# Patient Record
Sex: MALE | Race: WHITE | Employment: OTHER | URBAN - NONMETROPOLITAN AREA
[De-identification: names, ages, dates, MRNs, and addresses within clinical notes are randomized per-mention and may not be internally consistent; named-entity substitution may affect disease eponyms.]

---

## 2017-01-07 ENCOUNTER — HOSPITAL ENCOUNTER (INPATIENT)
Age: 67
LOS: 3 days | Discharge: HOME | DRG: 293 | End: 2017-01-10
Attending: EMERGENCY MEDICINE | Admitting: INTERNAL MEDICINE
Payer: MEDICARE

## 2017-01-07 ENCOUNTER — APPOINTMENT (OUTPATIENT)
Dept: GENERAL RADIOLOGY | Age: 67
DRG: 293 | End: 2017-01-07
Payer: MEDICARE

## 2017-01-07 DIAGNOSIS — I50.9 CONGESTIVE HEART FAILURE, UNSPECIFIED CONGESTIVE HEART FAILURE CHRONICITY, UNSPECIFIED CONGESTIVE HEART FAILURE TYPE: Primary | ICD-10-CM

## 2017-01-07 DIAGNOSIS — R79.89 ELEVATED LIVER FUNCTION TESTS: ICD-10-CM

## 2017-01-07 LAB
ALBUMIN SERPL-MCNC: 3.9 G/DL (ref 3.5–5.2)
ALP BLD-CCNC: 73 U/L (ref 40–130)
ALT SERPL-CCNC: 1425 U/L (ref 5–41)
ANION GAP SERPL CALCULATED.3IONS-SCNC: 19 MMOL/L (ref 7–19)
APTT: 35.5 SEC (ref 26–36.2)
AST SERPL-CCNC: 1472 U/L (ref 5–40)
BASE EXCESS ARTERIAL: -0.8 MMOL/L (ref -2–2)
BASOPHILS ABSOLUTE: 0 K/UL (ref 0–0.2)
BASOPHILS RELATIVE PERCENT: 0.3 % (ref 0–1)
BILIRUB SERPL-MCNC: 1 MG/DL (ref 0.2–1.2)
BUN BLDV-MCNC: 28 MG/DL (ref 8–23)
CALCIUM SERPL-MCNC: 9.2 MG/DL (ref 8.8–10.2)
CARBOXYHEMOGLOBIN ARTERIAL: 4.1 % (ref 0–5)
CHLORIDE BLD-SCNC: 96 MMOL/L (ref 98–111)
CO2: 24 MMOL/L (ref 22–29)
CREAT SERPL-MCNC: 1.1 MG/DL (ref 0.5–1.2)
EOSINOPHILS ABSOLUTE: 0 K/UL (ref 0–0.6)
EOSINOPHILS RELATIVE PERCENT: 0.2 % (ref 0–5)
GFR NON-AFRICAN AMERICAN: >60
GLOBULIN: 2.6 G/DL
GLUCOSE BLD-MCNC: 124 MG/DL (ref 70–99)
GLUCOSE BLD-MCNC: 139 MG/DL (ref 74–109)
HCO3 ARTERIAL: 23.5 MMOL/L (ref 22–26)
HCT VFR BLD CALC: 43.5 % (ref 42–52)
HEMOGLOBIN, ART, EXTENDED: 13.8 G/DL (ref 14–18)
HEMOGLOBIN: 14.1 G/DL (ref 14–18)
INR BLD: 1.41 (ref 0.88–1.18)
LYMPHOCYTES ABSOLUTE: 1.6 K/UL (ref 1.1–4.5)
LYMPHOCYTES RELATIVE PERCENT: 25.6 % (ref 20–40)
MCH RBC QN AUTO: 30.3 PG (ref 27–31)
MCHC RBC AUTO-ENTMCNC: 32.4 G/DL (ref 33–37)
MCV RBC AUTO: 93.5 FL (ref 80–94)
METHEMOGLOBIN ARTERIAL: 1.1 %
MONOCYTES ABSOLUTE: 0.5 K/UL (ref 0–0.9)
MONOCYTES RELATIVE PERCENT: 8 % (ref 0–10)
NEUTROPHILS ABSOLUTE: 4.2 K/UL (ref 1.5–7.5)
NEUTROPHILS RELATIVE PERCENT: 65.9 % (ref 50–65)
O2 CONTENT ARTERIAL: 17.1 ML/DL
O2 SAT, ARTERIAL: 88.2 %
O2 THERAPY: ABNORMAL
PCO2 ARTERIAL: 37 MMHG (ref 35–45)
PDW BLD-RTO: 14.3 % (ref 11.5–14.5)
PERFORMED ON: ABNORMAL
PERFORMED ON: NORMAL
PH ARTERIAL: 7.41 (ref 7.35–7.45)
PLATELET # BLD: 215 K/UL (ref 130–400)
PMV BLD AUTO: 10.8 FL (ref 7.4–10.4)
PO2 ARTERIAL: 63 MMHG (ref 80–100)
POC TROPONIN I: 0.06 NG/ML (ref 0–0.08)
POTASSIUM SERPL-SCNC: 4.8 MMOL/L (ref 3.5–5)
POTASSIUM, WHOLE BLOOD: 3.8
PRO-BNP: ABNORMAL PG/ML (ref 0–900)
PROTHROMBIN TIME: 17.1 SEC (ref 12–14.6)
RBC # BLD: 4.65 M/UL (ref 4.7–6.1)
SODIUM BLD-SCNC: 139 MMOL/L (ref 136–145)
TOTAL PROTEIN: 6.5 G/DL (ref 6.6–8.7)
TROPONIN: <0.01 NG/ML (ref 0–0.03)
WBC # BLD: 6.4 K/UL (ref 4.8–10.8)

## 2017-01-07 PROCEDURE — 84484 ASSAY OF TROPONIN QUANT: CPT

## 2017-01-07 PROCEDURE — 94640 AIRWAY INHALATION TREATMENT: CPT

## 2017-01-07 PROCEDURE — 99284 EMERGENCY DEPT VISIT MOD MDM: CPT | Performed by: EMERGENCY MEDICINE

## 2017-01-07 PROCEDURE — 82803 BLOOD GASES ANY COMBINATION: CPT

## 2017-01-07 PROCEDURE — 6370000000 HC RX 637 (ALT 250 FOR IP): Performed by: INTERNAL MEDICINE

## 2017-01-07 PROCEDURE — 82948 REAGENT STRIP/BLOOD GLUCOSE: CPT

## 2017-01-07 PROCEDURE — 36415 COLL VENOUS BLD VENIPUNCTURE: CPT

## 2017-01-07 PROCEDURE — 85610 PROTHROMBIN TIME: CPT

## 2017-01-07 PROCEDURE — 96375 TX/PRO/DX INJ NEW DRUG ADDON: CPT

## 2017-01-07 PROCEDURE — 2140000000 HC CCU INTERMEDIATE R&B

## 2017-01-07 PROCEDURE — 6370000000 HC RX 637 (ALT 250 FOR IP): Performed by: EMERGENCY MEDICINE

## 2017-01-07 PROCEDURE — 6360000002 HC RX W HCPCS: Performed by: INTERNAL MEDICINE

## 2017-01-07 PROCEDURE — 71020 XR CHEST STANDARD TWO VW: CPT

## 2017-01-07 PROCEDURE — 6360000002 HC RX W HCPCS: Performed by: EMERGENCY MEDICINE

## 2017-01-07 PROCEDURE — 2580000003 HC RX 258: Performed by: INTERNAL MEDICINE

## 2017-01-07 PROCEDURE — 85730 THROMBOPLASTIN TIME PARTIAL: CPT

## 2017-01-07 PROCEDURE — 83880 ASSAY OF NATRIURETIC PEPTIDE: CPT

## 2017-01-07 PROCEDURE — 80074 ACUTE HEPATITIS PANEL: CPT

## 2017-01-07 PROCEDURE — 36600 WITHDRAWAL OF ARTERIAL BLOOD: CPT

## 2017-01-07 PROCEDURE — 80053 COMPREHEN METABOLIC PANEL: CPT

## 2017-01-07 PROCEDURE — 99285 EMERGENCY DEPT VISIT HI MDM: CPT

## 2017-01-07 PROCEDURE — 96374 THER/PROPH/DIAG INJ IV PUSH: CPT

## 2017-01-07 PROCEDURE — 99223 1ST HOSP IP/OBS HIGH 75: CPT | Performed by: INTERNAL MEDICINE

## 2017-01-07 PROCEDURE — 2500000003 HC RX 250 WO HCPCS: Performed by: EMERGENCY MEDICINE

## 2017-01-07 PROCEDURE — 84132 ASSAY OF SERUM POTASSIUM: CPT

## 2017-01-07 PROCEDURE — 93005 ELECTROCARDIOGRAM TRACING: CPT

## 2017-01-07 PROCEDURE — 85025 COMPLETE CBC W/AUTO DIFF WBC: CPT

## 2017-01-07 RX ORDER — NICOTINE 21 MG/24HR
1 PATCH, TRANSDERMAL 24 HOURS TRANSDERMAL DAILY
Status: DISCONTINUED | OUTPATIENT
Start: 2017-01-07 | End: 2017-01-10 | Stop reason: HOSPADM

## 2017-01-07 RX ORDER — NITROGLYCERIN 0.4 MG/1
0.4 TABLET SUBLINGUAL
Status: DISCONTINUED | OUTPATIENT
Start: 2017-01-07 | End: 2017-01-08

## 2017-01-07 RX ORDER — BUMETANIDE 0.25 MG/ML
1 INJECTION, SOLUTION INTRAMUSCULAR; INTRAVENOUS ONCE
Status: COMPLETED | OUTPATIENT
Start: 2017-01-07 | End: 2017-01-07

## 2017-01-07 RX ORDER — GLIPIZIDE 10 MG/1
10 TABLET ORAL
Status: DISCONTINUED | OUTPATIENT
Start: 2017-01-08 | End: 2017-01-09

## 2017-01-07 RX ORDER — ONDANSETRON 2 MG/ML
4 INJECTION INTRAMUSCULAR; INTRAVENOUS EVERY 6 HOURS PRN
Status: DISCONTINUED | OUTPATIENT
Start: 2017-01-07 | End: 2017-01-10 | Stop reason: HOSPADM

## 2017-01-07 RX ORDER — PIOGLITAZONEHYDROCHLORIDE 45 MG/1
45 TABLET ORAL DAILY
Status: DISCONTINUED | OUTPATIENT
Start: 2017-01-07 | End: 2017-01-09

## 2017-01-07 RX ORDER — SODIUM CHLORIDE 0.9 % (FLUSH) 0.9 %
10 SYRINGE (ML) INJECTION EVERY 12 HOURS SCHEDULED
Status: DISCONTINUED | OUTPATIENT
Start: 2017-01-07 | End: 2017-01-10 | Stop reason: HOSPADM

## 2017-01-07 RX ORDER — BUMETANIDE 0.25 MG/ML
1 INJECTION, SOLUTION INTRAMUSCULAR; INTRAVENOUS EVERY 12 HOURS
Status: DISCONTINUED | OUTPATIENT
Start: 2017-01-07 | End: 2017-01-07 | Stop reason: ALTCHOICE

## 2017-01-07 RX ORDER — FUROSEMIDE 10 MG/ML
40 INJECTION INTRAMUSCULAR; INTRAVENOUS 2 TIMES DAILY
Status: DISCONTINUED | OUTPATIENT
Start: 2017-01-08 | End: 2017-01-08

## 2017-01-07 RX ORDER — IPRATROPIUM BROMIDE AND ALBUTEROL SULFATE 2.5; .5 MG/3ML; MG/3ML
1 SOLUTION RESPIRATORY (INHALATION)
Status: DISCONTINUED | OUTPATIENT
Start: 2017-01-07 | End: 2017-01-10 | Stop reason: HOSPADM

## 2017-01-07 RX ORDER — POTASSIUM CHLORIDE 7.45 MG/ML
10 INJECTION INTRAVENOUS PRN
Status: DISCONTINUED | OUTPATIENT
Start: 2017-01-07 | End: 2017-01-10 | Stop reason: HOSPADM

## 2017-01-07 RX ORDER — ASPIRIN 325 MG
325 TABLET ORAL DAILY
Status: DISCONTINUED | OUTPATIENT
Start: 2017-01-07 | End: 2017-01-10 | Stop reason: HOSPADM

## 2017-01-07 RX ORDER — POTASSIUM CHLORIDE 20 MEQ/1
40 TABLET, EXTENDED RELEASE ORAL PRN
Status: DISCONTINUED | OUTPATIENT
Start: 2017-01-07 | End: 2017-01-10 | Stop reason: HOSPADM

## 2017-01-07 RX ORDER — VITAMIN E 268 MG
400 CAPSULE ORAL DAILY
Status: DISCONTINUED | OUTPATIENT
Start: 2017-01-07 | End: 2017-01-10 | Stop reason: HOSPADM

## 2017-01-07 RX ORDER — SODIUM CHLORIDE 0.9 % (FLUSH) 0.9 %
10 SYRINGE (ML) INJECTION PRN
Status: DISCONTINUED | OUTPATIENT
Start: 2017-01-07 | End: 2017-01-10 | Stop reason: HOSPADM

## 2017-01-07 RX ORDER — ACETAMINOPHEN 325 MG/1
650 TABLET ORAL EVERY 4 HOURS PRN
Status: DISCONTINUED | OUTPATIENT
Start: 2017-01-07 | End: 2017-01-10 | Stop reason: HOSPADM

## 2017-01-07 RX ORDER — ACETAMINOPHEN 325 MG/1
650 TABLET ORAL EVERY 4 HOURS PRN
Status: DISCONTINUED | OUTPATIENT
Start: 2017-01-07 | End: 2017-01-10 | Stop reason: SDUPTHER

## 2017-01-07 RX ORDER — CARVEDILOL 6.25 MG/1
6.25 TABLET ORAL 2 TIMES DAILY WITH MEALS
Status: DISCONTINUED | OUTPATIENT
Start: 2017-01-07 | End: 2017-01-08

## 2017-01-07 RX ORDER — OXYBUTYNIN CHLORIDE 5 MG/1
15 TABLET, EXTENDED RELEASE ORAL DAILY
Status: DISCONTINUED | OUTPATIENT
Start: 2017-01-07 | End: 2017-01-10 | Stop reason: HOSPADM

## 2017-01-07 RX ORDER — SODIUM CHLORIDE 0.9 % (FLUSH) 0.9 %
10 SYRINGE (ML) INJECTION EVERY 12 HOURS SCHEDULED
Status: DISCONTINUED | OUTPATIENT
Start: 2017-01-07 | End: 2017-01-10 | Stop reason: SDUPTHER

## 2017-01-07 RX ORDER — POTASSIUM CHLORIDE 20MEQ/15ML
40 LIQUID (ML) ORAL PRN
Status: DISCONTINUED | OUTPATIENT
Start: 2017-01-07 | End: 2017-01-10 | Stop reason: HOSPADM

## 2017-01-07 RX ORDER — SIMVASTATIN 40 MG
40 TABLET ORAL NIGHTLY
Status: DISCONTINUED | OUTPATIENT
Start: 2017-01-07 | End: 2017-01-10 | Stop reason: HOSPADM

## 2017-01-07 RX ORDER — IPRATROPIUM BROMIDE AND ALBUTEROL SULFATE 2.5; .5 MG/3ML; MG/3ML
1 SOLUTION RESPIRATORY (INHALATION) ONCE
Status: COMPLETED | OUTPATIENT
Start: 2017-01-07 | End: 2017-01-07

## 2017-01-07 RX ORDER — METHYLPREDNISOLONE SODIUM SUCCINATE 125 MG/2ML
125 INJECTION, POWDER, LYOPHILIZED, FOR SOLUTION INTRAMUSCULAR; INTRAVENOUS ONCE
Status: COMPLETED | OUTPATIENT
Start: 2017-01-07 | End: 2017-01-07

## 2017-01-07 RX ORDER — SODIUM CHLORIDE 0.9 % (FLUSH) 0.9 %
10 SYRINGE (ML) INJECTION PRN
Status: DISCONTINUED | OUTPATIENT
Start: 2017-01-07 | End: 2017-01-10 | Stop reason: SDUPTHER

## 2017-01-07 RX ORDER — LISINOPRIL 10 MG/1
5 TABLET ORAL DAILY
Status: DISCONTINUED | OUTPATIENT
Start: 2017-01-07 | End: 2017-01-10 | Stop reason: HOSPADM

## 2017-01-07 RX ADMIN — OXYBUTYNIN CHLORIDE 15 MG: 5 TABLET, FILM COATED, EXTENDED RELEASE ORAL at 21:18

## 2017-01-07 RX ADMIN — PIOGLITAZONE 45 MG: 45 TABLET ORAL at 21:18

## 2017-01-07 RX ADMIN — IPRATROPIUM BROMIDE AND ALBUTEROL SULFATE 1 AMPULE: .5; 3 SOLUTION RESPIRATORY (INHALATION) at 16:25

## 2017-01-07 RX ADMIN — IPRATROPIUM BROMIDE AND ALBUTEROL SULFATE 1 AMPULE: .5; 3 SOLUTION RESPIRATORY (INHALATION) at 23:16

## 2017-01-07 RX ADMIN — ENOXAPARIN SODIUM 40 MG: 40 INJECTION SUBCUTANEOUS at 21:19

## 2017-01-07 RX ADMIN — LISINOPRIL 5 MG: 10 TABLET ORAL at 21:18

## 2017-01-07 RX ADMIN — BUMETANIDE 1 MG: 0.25 INJECTION, SOLUTION INTRAMUSCULAR; INTRAVENOUS at 16:45

## 2017-01-07 RX ADMIN — SIMVASTATIN 40 MG: 40 TABLET, FILM COATED ORAL at 21:18

## 2017-01-07 RX ADMIN — VITAMIN E CAP 400 UNIT 400 UNITS: 400 CAP at 21:19

## 2017-01-07 RX ADMIN — CARVEDILOL 6.25 MG: 6.25 TABLET, FILM COATED ORAL at 21:18

## 2017-01-07 RX ADMIN — Medication 10 ML: at 21:19

## 2017-01-07 RX ADMIN — ASPIRIN 325 MG ORAL TABLET 325 MG: 325 PILL ORAL at 21:18

## 2017-01-07 RX ADMIN — METHYLPREDNISOLONE SODIUM SUCCINATE 125 MG: 125 INJECTION, POWDER, FOR SOLUTION INTRAMUSCULAR; INTRAVENOUS at 16:19

## 2017-01-07 ASSESSMENT — ENCOUNTER SYMPTOMS
EYE PAIN: 0
RHINORRHEA: 0
CONSTIPATION: 0
BLOOD IN STOOL: 0
TROUBLE SWALLOWING: 0
COUGH: 1
WHEEZING: 1
SORE THROAT: 0
BACK PAIN: 0
SINUS PRESSURE: 0
SPUTUM PRODUCTION: 1
HEMOPTYSIS: 0
DIARRHEA: 0
FACIAL SWELLING: 0
ABDOMINAL DISTENTION: 0
SHORTNESS OF BREATH: 1
VOMITING: 0
NAUSEA: 0
CHEST TIGHTNESS: 0
STRIDOR: 0
ABDOMINAL PAIN: 0
PHOTOPHOBIA: 0

## 2017-01-07 ASSESSMENT — PAIN SCALES - GENERAL: PAINLEVEL_OUTOF10: 0

## 2017-01-08 LAB
ALBUMIN SERPL-MCNC: 3.7 G/DL (ref 3.5–5.2)
ALP BLD-CCNC: 68 U/L (ref 40–130)
ALT SERPL-CCNC: 1397 U/L (ref 5–41)
ANION GAP SERPL CALCULATED.3IONS-SCNC: 16 MMOL/L (ref 7–19)
AST SERPL-CCNC: 1105 U/L (ref 5–40)
BASOPHILS ABSOLUTE: 0 K/UL (ref 0–0.2)
BASOPHILS RELATIVE PERCENT: 0.2 % (ref 0–1)
BILIRUB SERPL-MCNC: 0.9 MG/DL (ref 0.2–1.2)
BUN BLDV-MCNC: 22 MG/DL (ref 8–23)
CALCIUM SERPL-MCNC: 8.6 MG/DL (ref 8.8–10.2)
CHLORIDE BLD-SCNC: 96 MMOL/L (ref 98–111)
CO2: 26 MMOL/L (ref 22–29)
CREAT SERPL-MCNC: 0.8 MG/DL (ref 0.5–1.2)
EOSINOPHILS ABSOLUTE: 0 K/UL (ref 0–0.6)
EOSINOPHILS RELATIVE PERCENT: 0 % (ref 0–5)
GFR NON-AFRICAN AMERICAN: >60
GLOBULIN: 2.3 G/DL
GLUCOSE BLD-MCNC: 200 MG/DL (ref 74–109)
HCT VFR BLD CALC: 42 % (ref 42–52)
HEMOGLOBIN: 13.8 G/DL (ref 14–18)
LYMPHOCYTES ABSOLUTE: 0.7 K/UL (ref 1.1–4.5)
LYMPHOCYTES RELATIVE PERCENT: 12.9 % (ref 20–40)
MAGNESIUM: 1.7 MG/DL (ref 1.6–2.4)
MCH RBC QN AUTO: 30.1 PG (ref 27–31)
MCHC RBC AUTO-ENTMCNC: 32.9 G/DL (ref 33–37)
MCV RBC AUTO: 91.5 FL (ref 80–94)
MONOCYTES ABSOLUTE: 0.2 K/UL (ref 0–0.9)
MONOCYTES RELATIVE PERCENT: 3 % (ref 0–10)
NEUTROPHILS ABSOLUTE: 4.4 K/UL (ref 1.5–7.5)
NEUTROPHILS RELATIVE PERCENT: 83.9 % (ref 50–65)
PDW BLD-RTO: 14 % (ref 11.5–14.5)
PLATELET # BLD: 195 K/UL (ref 130–400)
PMV BLD AUTO: 10.5 FL (ref 7.4–10.4)
POTASSIUM SERPL-SCNC: 4.3 MMOL/L (ref 3.5–5)
RBC # BLD: 4.59 M/UL (ref 4.7–6.1)
SODIUM BLD-SCNC: 138 MMOL/L (ref 136–145)
TOTAL PROTEIN: 6 G/DL (ref 6.6–8.7)
TROPONIN: <0.01 NG/ML (ref 0–0.03)
WBC # BLD: 5.3 K/UL (ref 4.8–10.8)

## 2017-01-08 PROCEDURE — 94640 AIRWAY INHALATION TREATMENT: CPT

## 2017-01-08 PROCEDURE — 93005 ELECTROCARDIOGRAM TRACING: CPT

## 2017-01-08 PROCEDURE — 84484 ASSAY OF TROPONIN QUANT: CPT

## 2017-01-08 PROCEDURE — 83735 ASSAY OF MAGNESIUM: CPT

## 2017-01-08 PROCEDURE — 6360000002 HC RX W HCPCS: Performed by: INTERNAL MEDICINE

## 2017-01-08 PROCEDURE — 6370000000 HC RX 637 (ALT 250 FOR IP): Performed by: EMERGENCY MEDICINE

## 2017-01-08 PROCEDURE — 36415 COLL VENOUS BLD VENIPUNCTURE: CPT

## 2017-01-08 PROCEDURE — 2580000003 HC RX 258: Performed by: INTERNAL MEDICINE

## 2017-01-08 PROCEDURE — 2500000003 HC RX 250 WO HCPCS: Performed by: INTERNAL MEDICINE

## 2017-01-08 PROCEDURE — 80053 COMPREHEN METABOLIC PANEL: CPT

## 2017-01-08 PROCEDURE — 85025 COMPLETE CBC W/AUTO DIFF WBC: CPT

## 2017-01-08 PROCEDURE — 6370000000 HC RX 637 (ALT 250 FOR IP): Performed by: INTERNAL MEDICINE

## 2017-01-08 PROCEDURE — 2140000000 HC CCU INTERMEDIATE R&B

## 2017-01-08 PROCEDURE — 99232 SBSQ HOSP IP/OBS MODERATE 35: CPT | Performed by: INTERNAL MEDICINE

## 2017-01-08 PROCEDURE — 93306 TTE W/DOPPLER COMPLETE: CPT

## 2017-01-08 RX ORDER — CARVEDILOL 12.5 MG/1
12.5 TABLET ORAL 2 TIMES DAILY WITH MEALS
Status: DISCONTINUED | OUTPATIENT
Start: 2017-01-08 | End: 2017-01-10 | Stop reason: HOSPADM

## 2017-01-08 RX ORDER — SPIRONOLACTONE 25 MG/1
25 TABLET ORAL DAILY
Status: DISCONTINUED | OUTPATIENT
Start: 2017-01-08 | End: 2017-01-10 | Stop reason: HOSPADM

## 2017-01-08 RX ORDER — BUMETANIDE 0.25 MG/ML
2 INJECTION, SOLUTION INTRAMUSCULAR; INTRAVENOUS ONCE
Status: COMPLETED | OUTPATIENT
Start: 2017-01-08 | End: 2017-01-08

## 2017-01-08 RX ORDER — NITROGLYCERIN 0.4 MG/1
0.4 TABLET SUBLINGUAL EVERY 5 MIN PRN
Status: DISCONTINUED | OUTPATIENT
Start: 2017-01-08 | End: 2017-01-10 | Stop reason: HOSPADM

## 2017-01-08 RX ADMIN — Medication 10 ML: at 19:51

## 2017-01-08 RX ADMIN — CARVEDILOL 12.5 MG: 12.5 TABLET, FILM COATED ORAL at 17:45

## 2017-01-08 RX ADMIN — VITAMIN E CAP 400 UNIT 400 UNITS: 400 CAP at 10:02

## 2017-01-08 RX ADMIN — Medication 10 ML: at 10:04

## 2017-01-08 RX ADMIN — IPRATROPIUM BROMIDE AND ALBUTEROL SULFATE 1 AMPULE: .5; 3 SOLUTION RESPIRATORY (INHALATION) at 15:21

## 2017-01-08 RX ADMIN — FUROSEMIDE 40 MG: 10 INJECTION, SOLUTION INTRAMUSCULAR; INTRAVENOUS at 10:01

## 2017-01-08 RX ADMIN — ASPIRIN 325 MG ORAL TABLET 325 MG: 325 PILL ORAL at 10:01

## 2017-01-08 RX ADMIN — OXYBUTYNIN CHLORIDE 15 MG: 5 TABLET, FILM COATED, EXTENDED RELEASE ORAL at 10:01

## 2017-01-08 RX ADMIN — Medication 10 ML: at 10:02

## 2017-01-08 RX ADMIN — IPRATROPIUM BROMIDE AND ALBUTEROL SULFATE 1 AMPULE: .5; 3 SOLUTION RESPIRATORY (INHALATION) at 19:12

## 2017-01-08 RX ADMIN — LISINOPRIL 5 MG: 10 TABLET ORAL at 10:02

## 2017-01-08 RX ADMIN — SPIRONOLACTONE 25 MG: 25 TABLET, FILM COATED ORAL at 14:37

## 2017-01-08 RX ADMIN — IPRATROPIUM BROMIDE AND ALBUTEROL SULFATE 1 AMPULE: .5; 3 SOLUTION RESPIRATORY (INHALATION) at 07:08

## 2017-01-08 RX ADMIN — GLIPIZIDE 10 MG: 10 TABLET ORAL at 10:00

## 2017-01-08 RX ADMIN — BUMETANIDE 2 MG: 0.25 INJECTION, SOLUTION INTRAMUSCULAR; INTRAVENOUS at 14:37

## 2017-01-08 RX ADMIN — SIMVASTATIN 40 MG: 40 TABLET, FILM COATED ORAL at 19:51

## 2017-01-08 RX ADMIN — PIOGLITAZONE 45 MG: 45 TABLET ORAL at 10:01

## 2017-01-08 RX ADMIN — CARVEDILOL 6.25 MG: 6.25 TABLET, FILM COATED ORAL at 10:02

## 2017-01-08 RX ADMIN — IPRATROPIUM BROMIDE AND ALBUTEROL SULFATE 1 AMPULE: .5; 3 SOLUTION RESPIRATORY (INHALATION) at 22:41

## 2017-01-08 RX ADMIN — IPRATROPIUM BROMIDE AND ALBUTEROL SULFATE 1 AMPULE: .5; 3 SOLUTION RESPIRATORY (INHALATION) at 11:03

## 2017-01-08 RX ADMIN — ENOXAPARIN SODIUM 40 MG: 40 INJECTION SUBCUTANEOUS at 19:51

## 2017-01-09 PROBLEM — Z72.0 TOBACCO ABUSE: Chronic | Status: ACTIVE | Noted: 2017-01-09

## 2017-01-09 PROBLEM — I50.23 ACUTE ON CHRONIC SYSTOLIC (CONGESTIVE) HEART FAILURE (HCC): Status: ACTIVE | Noted: 2017-01-09

## 2017-01-09 LAB
ALBUMIN SERPL-MCNC: 3.5 G/DL (ref 3.5–5.2)
ALP BLD-CCNC: 63 U/L (ref 40–130)
ALT SERPL-CCNC: 999 U/L (ref 5–41)
ANION GAP SERPL CALCULATED.3IONS-SCNC: 15 MMOL/L (ref 7–19)
AST SERPL-CCNC: 441 U/L (ref 5–40)
BILIRUB SERPL-MCNC: 0.6 MG/DL (ref 0.2–1.2)
BUN BLDV-MCNC: 29 MG/DL (ref 8–23)
CALCIUM SERPL-MCNC: 8.6 MG/DL (ref 8.8–10.2)
CHLORIDE BLD-SCNC: 98 MMOL/L (ref 98–111)
CO2: 29 MMOL/L (ref 22–29)
CREAT SERPL-MCNC: 1 MG/DL (ref 0.5–1.2)
GFR NON-AFRICAN AMERICAN: >60
GLOBULIN: 2.2 G/DL
GLUCOSE BLD-MCNC: 105 MG/DL (ref 74–109)
GLUCOSE BLD-MCNC: 122 MG/DL (ref 70–99)
GLUCOSE BLD-MCNC: 172 MG/DL (ref 70–99)
GLUCOSE BLD-MCNC: 228 MG/DL (ref 70–99)
HAV IGM SER IA-ACNC: NORMAL
HBA1C MFR BLD: 6.1 %
HEPATITIS B CORE IGM ANTIBODY: NORMAL
HEPATITIS B SURFACE ANTIGEN INTERPRETATION: NORMAL
HEPATITIS C ANTIBODY INTERPRETATION: NORMAL
PERFORMED ON: ABNORMAL
POTASSIUM SERPL-SCNC: 3.7 MMOL/L (ref 3.5–5)
SODIUM BLD-SCNC: 142 MMOL/L (ref 136–145)
TOTAL PROTEIN: 5.7 G/DL (ref 6.6–8.7)

## 2017-01-09 PROCEDURE — 6370000000 HC RX 637 (ALT 250 FOR IP): Performed by: NURSE PRACTITIONER

## 2017-01-09 PROCEDURE — 6360000002 HC RX W HCPCS: Performed by: INTERNAL MEDICINE

## 2017-01-09 PROCEDURE — 2580000003 HC RX 258: Performed by: INTERNAL MEDICINE

## 2017-01-09 PROCEDURE — 80053 COMPREHEN METABOLIC PANEL: CPT

## 2017-01-09 PROCEDURE — 82948 REAGENT STRIP/BLOOD GLUCOSE: CPT

## 2017-01-09 PROCEDURE — 6370000000 HC RX 637 (ALT 250 FOR IP): Performed by: INTERNAL MEDICINE

## 2017-01-09 PROCEDURE — 2140000000 HC CCU INTERMEDIATE R&B

## 2017-01-09 PROCEDURE — 94640 AIRWAY INHALATION TREATMENT: CPT

## 2017-01-09 PROCEDURE — 36415 COLL VENOUS BLD VENIPUNCTURE: CPT

## 2017-01-09 PROCEDURE — 99222 1ST HOSP IP/OBS MODERATE 55: CPT | Performed by: INTERNAL MEDICINE

## 2017-01-09 PROCEDURE — 83036 HEMOGLOBIN GLYCOSYLATED A1C: CPT

## 2017-01-09 PROCEDURE — 93005 ELECTROCARDIOGRAM TRACING: CPT

## 2017-01-09 PROCEDURE — 99231 SBSQ HOSP IP/OBS SF/LOW 25: CPT | Performed by: INTERNAL MEDICINE

## 2017-01-09 PROCEDURE — 6370000000 HC RX 637 (ALT 250 FOR IP): Performed by: EMERGENCY MEDICINE

## 2017-01-09 RX ORDER — DEXTROSE MONOHYDRATE 50 MG/ML
100 INJECTION, SOLUTION INTRAVENOUS PRN
Status: DISCONTINUED | OUTPATIENT
Start: 2017-01-09 | End: 2017-01-10 | Stop reason: HOSPADM

## 2017-01-09 RX ORDER — FUROSEMIDE 10 MG/ML
40 INJECTION INTRAMUSCULAR; INTRAVENOUS DAILY
Status: DISCONTINUED | OUTPATIENT
Start: 2017-01-09 | End: 2017-01-10 | Stop reason: HOSPADM

## 2017-01-09 RX ORDER — DEXTROSE MONOHYDRATE 25 G/50ML
12.5 INJECTION, SOLUTION INTRAVENOUS PRN
Status: DISCONTINUED | OUTPATIENT
Start: 2017-01-09 | End: 2017-01-10 | Stop reason: HOSPADM

## 2017-01-09 RX ORDER — NICOTINE POLACRILEX 4 MG
15 LOZENGE BUCCAL PRN
Status: DISCONTINUED | OUTPATIENT
Start: 2017-01-09 | End: 2017-01-10 | Stop reason: HOSPADM

## 2017-01-09 RX ORDER — INSULIN GLARGINE 100 [IU]/ML
10 INJECTION, SOLUTION SUBCUTANEOUS NIGHTLY
Status: DISCONTINUED | OUTPATIENT
Start: 2017-01-09 | End: 2017-01-10 | Stop reason: HOSPADM

## 2017-01-09 RX ORDER — GLIPIZIDE 10 MG/1
10 TABLET, FILM COATED, EXTENDED RELEASE ORAL DAILY
COMMUNITY
End: 2017-08-16 | Stop reason: SDUPTHER

## 2017-01-09 RX ORDER — M-VIT,TX,IRON,MINS/CALC/FOLIC 27MG-0.4MG
1 TABLET ORAL DAILY
COMMUNITY

## 2017-01-09 RX ORDER — OXYBUTYNIN CHLORIDE 15 MG/1
15 TABLET, EXTENDED RELEASE ORAL DAILY
COMMUNITY
End: 2020-09-02

## 2017-01-09 RX ADMIN — IPRATROPIUM BROMIDE AND ALBUTEROL SULFATE 1 AMPULE: .5; 3 SOLUTION RESPIRATORY (INHALATION) at 10:05

## 2017-01-09 RX ADMIN — IPRATROPIUM BROMIDE AND ALBUTEROL SULFATE 1 AMPULE: .5; 3 SOLUTION RESPIRATORY (INHALATION) at 07:04

## 2017-01-09 RX ADMIN — IPRATROPIUM BROMIDE AND ALBUTEROL SULFATE 1 AMPULE: .5; 3 SOLUTION RESPIRATORY (INHALATION) at 14:51

## 2017-01-09 RX ADMIN — CARVEDILOL 12.5 MG: 12.5 TABLET, FILM COATED ORAL at 08:44

## 2017-01-09 RX ADMIN — Medication 10 ML: at 21:20

## 2017-01-09 RX ADMIN — CARVEDILOL 12.5 MG: 12.5 TABLET, FILM COATED ORAL at 17:34

## 2017-01-09 RX ADMIN — GLIPIZIDE 10 MG: 10 TABLET ORAL at 08:44

## 2017-01-09 RX ADMIN — LISINOPRIL 5 MG: 10 TABLET ORAL at 08:44

## 2017-01-09 RX ADMIN — SIMVASTATIN 40 MG: 40 TABLET, FILM COATED ORAL at 20:25

## 2017-01-09 RX ADMIN — IPRATROPIUM BROMIDE AND ALBUTEROL SULFATE 1 AMPULE: .5; 3 SOLUTION RESPIRATORY (INHALATION) at 22:43

## 2017-01-09 RX ADMIN — IPRATROPIUM BROMIDE AND ALBUTEROL SULFATE 1 AMPULE: .5; 3 SOLUTION RESPIRATORY (INHALATION) at 19:24

## 2017-01-09 RX ADMIN — INSULIN GLARGINE 10 UNITS: 100 INJECTION, SOLUTION SUBCUTANEOUS at 20:25

## 2017-01-09 RX ADMIN — FUROSEMIDE 40 MG: 10 INJECTION, SOLUTION INTRAMUSCULAR; INTRAVENOUS at 17:33

## 2017-01-09 RX ADMIN — Medication 10 ML: at 20:25

## 2017-01-09 RX ADMIN — PIOGLITAZONE 45 MG: 45 TABLET ORAL at 08:44

## 2017-01-09 RX ADMIN — ASPIRIN 325 MG ORAL TABLET 325 MG: 325 PILL ORAL at 08:44

## 2017-01-09 RX ADMIN — INSULIN LISPRO 3 UNITS: 100 INJECTION, SOLUTION INTRAVENOUS; SUBCUTANEOUS at 17:34

## 2017-01-09 RX ADMIN — ACETAMINOPHEN 650 MG: 325 TABLET, FILM COATED ORAL at 09:06

## 2017-01-09 RX ADMIN — OXYBUTYNIN CHLORIDE 15 MG: 5 TABLET, FILM COATED, EXTENDED RELEASE ORAL at 08:44

## 2017-01-09 RX ADMIN — VITAMIN E CAP 400 UNIT 400 UNITS: 400 CAP at 08:43

## 2017-01-09 RX ADMIN — ENOXAPARIN SODIUM 40 MG: 40 INJECTION SUBCUTANEOUS at 20:25

## 2017-01-09 RX ADMIN — Medication 10 ML: at 08:44

## 2017-01-09 RX ADMIN — SPIRONOLACTONE 25 MG: 25 TABLET, FILM COATED ORAL at 08:44

## 2017-01-09 ASSESSMENT — PAIN SCALES - GENERAL: PAINLEVEL_OUTOF10: 5

## 2017-01-10 VITALS
RESPIRATION RATE: 16 BRPM | WEIGHT: 203 LBS | DIASTOLIC BLOOD PRESSURE: 84 MMHG | OXYGEN SATURATION: 96 % | TEMPERATURE: 97 F | HEART RATE: 74 BPM | SYSTOLIC BLOOD PRESSURE: 158 MMHG | HEIGHT: 72 IN | BODY MASS INDEX: 27.5 KG/M2

## 2017-01-10 LAB
EKG P AXIS: 51 DEGREES
EKG P AXIS: 9 DEGREES
EKG P-R INTERVAL: 132 MS
EKG P-R INTERVAL: 146 MS
EKG Q-T INTERVAL: 432 MS
EKG Q-T INTERVAL: 492 MS
EKG QRS DURATION: 152 MS
EKG QRS DURATION: 156 MS
EKG QTC CALCULATION (BAZETT): 467 MS
EKG QTC CALCULATION (BAZETT): 493 MS
EKG T AXIS: -56 DEGREES
EKG T AXIS: -71 DEGREES
GLUCOSE BLD-MCNC: 122 MG/DL (ref 70–99)
PERFORMED ON: ABNORMAL

## 2017-01-10 PROCEDURE — 93350 STRESS TTE ONLY: CPT

## 2017-01-10 PROCEDURE — 93005 ELECTROCARDIOGRAM TRACING: CPT

## 2017-01-10 PROCEDURE — 99232 SBSQ HOSP IP/OBS MODERATE 35: CPT | Performed by: HOSPITALIST

## 2017-01-10 PROCEDURE — 82948 REAGENT STRIP/BLOOD GLUCOSE: CPT

## 2017-01-10 PROCEDURE — 6370000000 HC RX 637 (ALT 250 FOR IP): Performed by: INTERNAL MEDICINE

## 2017-01-10 PROCEDURE — 6370000000 HC RX 637 (ALT 250 FOR IP): Performed by: EMERGENCY MEDICINE

## 2017-01-10 PROCEDURE — 99238 HOSP IP/OBS DSCHRG MGMT 30/<: CPT | Performed by: INTERNAL MEDICINE

## 2017-01-10 PROCEDURE — 6360000002 HC RX W HCPCS: Performed by: INTERNAL MEDICINE

## 2017-01-10 PROCEDURE — 2580000003 HC RX 258: Performed by: INTERNAL MEDICINE

## 2017-01-10 PROCEDURE — 94640 AIRWAY INHALATION TREATMENT: CPT

## 2017-01-10 RX ORDER — SPIRONOLACTONE 25 MG/1
25 TABLET ORAL DAILY
Qty: 30 TABLET | Refills: 3 | Status: SHIPPED | OUTPATIENT
Start: 2017-01-10 | End: 2017-05-03 | Stop reason: SDUPTHER

## 2017-01-10 RX ORDER — SODIUM CHLORIDE 9 MG/ML
INJECTION, SOLUTION INTRAVENOUS
Status: COMPLETED | OUTPATIENT
Start: 2017-01-10 | End: 2017-01-10

## 2017-01-10 RX ORDER — FUROSEMIDE 40 MG/1
40 TABLET ORAL DAILY
Qty: 60 TABLET | Refills: 3 | Status: SHIPPED | OUTPATIENT
Start: 2017-01-10 | End: 2017-08-30 | Stop reason: SDUPTHER

## 2017-01-10 RX ORDER — DOBUTAMINE HYDROCHLORIDE 200 MG/100ML
10 INJECTION INTRAVENOUS ONCE
Status: COMPLETED | OUTPATIENT
Start: 2017-01-10 | End: 2017-01-10

## 2017-01-10 RX ORDER — SODIUM CHLORIDE 0.9 % (FLUSH) 0.9 %
10 SYRINGE (ML) INJECTION PRN
Status: ACTIVE | OUTPATIENT
Start: 2017-01-10 | End: 2017-01-11

## 2017-01-10 RX ADMIN — DOBUTAMINE HYDROCHLORIDE 10 MCG/KG/MIN: 200 INJECTION INTRAVENOUS at 08:00

## 2017-01-10 RX ADMIN — CARVEDILOL 12.5 MG: 12.5 TABLET, FILM COATED ORAL at 09:08

## 2017-01-10 RX ADMIN — LISINOPRIL: 10 TABLET ORAL at 09:10

## 2017-01-10 RX ADMIN — VITAMIN E CAP 400 UNIT 400 UNITS: 400 CAP at 09:08

## 2017-01-10 RX ADMIN — FUROSEMIDE 40 MG: 10 INJECTION, SOLUTION INTRAMUSCULAR; INTRAVENOUS at 09:09

## 2017-01-10 RX ADMIN — OXYBUTYNIN CHLORIDE 15 MG: 5 TABLET, FILM COATED, EXTENDED RELEASE ORAL at 09:08

## 2017-01-10 RX ADMIN — SODIUM CHLORIDE 50 ML: 9 INJECTION, SOLUTION INTRAVENOUS at 08:00

## 2017-01-10 RX ADMIN — IPRATROPIUM BROMIDE AND ALBUTEROL SULFATE 1 AMPULE: .5; 3 SOLUTION RESPIRATORY (INHALATION) at 06:50

## 2017-01-10 RX ADMIN — IPRATROPIUM BROMIDE AND ALBUTEROL SULFATE 1 AMPULE: .5; 3 SOLUTION RESPIRATORY (INHALATION) at 10:50

## 2017-01-10 RX ADMIN — ASPIRIN 325 MG ORAL TABLET 325 MG: 325 PILL ORAL at 09:08

## 2017-01-10 RX ADMIN — Medication 10 ML: at 09:10

## 2017-01-10 RX ADMIN — Medication 10 ML: at 08:30

## 2017-01-10 RX ADMIN — ATROPINE SULFATE 0.5 MG: 0.1 INJECTION PARENTERAL at 08:06

## 2017-01-17 LAB
EKG P AXIS: 29 DEGREES
EKG P-R INTERVAL: 140 MS
EKG Q-T INTERVAL: 464 MS
EKG QRS DURATION: 154 MS
EKG QTC CALCULATION (BAZETT): 479 MS
EKG T AXIS: -46 DEGREES

## 2017-02-08 DIAGNOSIS — I10 ESSENTIAL HYPERTENSION: ICD-10-CM

## 2017-02-08 DIAGNOSIS — I25.119 CORONARY ARTERY DISEASE INVOLVING NATIVE HEART WITH ANGINA PECTORIS, UNSPECIFIED VESSEL OR LESION TYPE (HCC): ICD-10-CM

## 2017-02-08 RX ORDER — RAMIPRIL 5 MG/1
5 CAPSULE ORAL DAILY
Qty: 90 CAPSULE | Refills: 3 | Status: SHIPPED | OUTPATIENT
Start: 2017-02-08 | End: 2018-01-31 | Stop reason: SDUPTHER

## 2017-03-15 ENCOUNTER — OFFICE VISIT (OUTPATIENT)
Dept: CARDIOLOGY | Age: 67
End: 2017-03-15
Payer: MEDICARE

## 2017-03-15 VITALS
HEIGHT: 72 IN | DIASTOLIC BLOOD PRESSURE: 82 MMHG | WEIGHT: 220 LBS | BODY MASS INDEX: 29.8 KG/M2 | SYSTOLIC BLOOD PRESSURE: 138 MMHG

## 2017-03-15 DIAGNOSIS — I10 ESSENTIAL HYPERTENSION: ICD-10-CM

## 2017-03-15 DIAGNOSIS — I25.10 ASHD (ARTERIOSCLEROTIC HEART DISEASE): Primary | ICD-10-CM

## 2017-03-15 PROCEDURE — 99213 OFFICE O/P EST LOW 20 MIN: CPT | Performed by: INTERNAL MEDICINE

## 2017-03-15 PROCEDURE — G8598 ASA/ANTIPLAT THER USED: HCPCS | Performed by: INTERNAL MEDICINE

## 2017-03-15 PROCEDURE — 4040F PNEUMOC VAC/ADMIN/RCVD: CPT | Performed by: INTERNAL MEDICINE

## 2017-03-15 PROCEDURE — 1123F ACP DISCUSS/DSCN MKR DOCD: CPT | Performed by: INTERNAL MEDICINE

## 2017-03-15 PROCEDURE — G8420 CALC BMI NORM PARAMETERS: HCPCS | Performed by: INTERNAL MEDICINE

## 2017-03-15 PROCEDURE — 4004F PT TOBACCO SCREEN RCVD TLK: CPT | Performed by: INTERNAL MEDICINE

## 2017-03-15 PROCEDURE — 3017F COLORECTAL CA SCREEN DOC REV: CPT | Performed by: INTERNAL MEDICINE

## 2017-03-15 PROCEDURE — G8427 DOCREV CUR MEDS BY ELIG CLIN: HCPCS | Performed by: INTERNAL MEDICINE

## 2017-03-15 PROCEDURE — G8484 FLU IMMUNIZE NO ADMIN: HCPCS | Performed by: INTERNAL MEDICINE

## 2017-03-24 DIAGNOSIS — I25.10 CORONARY ARTERY DISEASE INVOLVING NATIVE HEART WITHOUT ANGINA PECTORIS, UNSPECIFIED VESSEL OR LESION TYPE: ICD-10-CM

## 2017-03-24 DIAGNOSIS — I10 ESSENTIAL HYPERTENSION: ICD-10-CM

## 2017-03-24 RX ORDER — METOPROLOL TARTRATE 50 MG/1
50 TABLET, FILM COATED ORAL 2 TIMES DAILY
Qty: 180 TABLET | Refills: 3 | Status: SHIPPED | OUTPATIENT
Start: 2017-03-24 | End: 2018-03-15 | Stop reason: SDUPTHER

## 2017-04-05 DIAGNOSIS — N13.8 BPH (BENIGN PROSTATIC HYPERTROPHY) WITH URINARY OBSTRUCTION: Primary | ICD-10-CM

## 2017-04-05 DIAGNOSIS — N40.1 BPH (BENIGN PROSTATIC HYPERTROPHY) WITH URINARY OBSTRUCTION: Primary | ICD-10-CM

## 2017-04-06 RX ORDER — OXYBUTYNIN CHLORIDE 15 MG/1
TABLET, EXTENDED RELEASE ORAL
Qty: 8 TABLET | Refills: 0 | Status: SHIPPED | OUTPATIENT
Start: 2017-04-06 | End: 2017-04-12 | Stop reason: SDUPTHER

## 2017-04-12 DIAGNOSIS — N40.1 BPH (BENIGN PROSTATIC HYPERTROPHY) WITH URINARY OBSTRUCTION: ICD-10-CM

## 2017-04-12 DIAGNOSIS — N13.8 BPH (BENIGN PROSTATIC HYPERTROPHY) WITH URINARY OBSTRUCTION: ICD-10-CM

## 2017-04-12 RX ORDER — OXYBUTYNIN CHLORIDE 15 MG/1
15 TABLET, EXTENDED RELEASE ORAL DAILY
Qty: 8 TABLET | Refills: 0 | Status: SHIPPED | OUTPATIENT
Start: 2017-04-12 | End: 2017-04-20 | Stop reason: SDUPTHER

## 2017-04-12 NOTE — TELEPHONE ENCOUNTER
Called and spoke with patient and let her know that i called and let her  Know that i called in her RX for her enough till her appointment date. ML

## 2017-04-13 DIAGNOSIS — N40.1 BPH WITH URINARY OBSTRUCTION: Primary | ICD-10-CM

## 2017-04-13 DIAGNOSIS — N13.8 BPH WITH URINARY OBSTRUCTION: Primary | ICD-10-CM

## 2017-04-13 LAB — PSA SERPL-MCNC: 1.11 NG/ML (ref 0–4)

## 2017-04-20 ENCOUNTER — OFFICE VISIT (OUTPATIENT)
Dept: UROLOGY | Facility: CLINIC | Age: 67
End: 2017-04-20

## 2017-04-20 VITALS
TEMPERATURE: 97.5 F | WEIGHT: 225.6 LBS | BODY MASS INDEX: 30.56 KG/M2 | HEIGHT: 72 IN | SYSTOLIC BLOOD PRESSURE: 120 MMHG | DIASTOLIC BLOOD PRESSURE: 74 MMHG

## 2017-04-20 DIAGNOSIS — N40.1 BPH WITH URINARY OBSTRUCTION: Primary | ICD-10-CM

## 2017-04-20 DIAGNOSIS — N40.1 BPH (BENIGN PROSTATIC HYPERTROPHY) WITH URINARY OBSTRUCTION: ICD-10-CM

## 2017-04-20 DIAGNOSIS — N13.8 BPH (BENIGN PROSTATIC HYPERTROPHY) WITH URINARY OBSTRUCTION: ICD-10-CM

## 2017-04-20 DIAGNOSIS — R35.0 FREQUENCY OF URINATION: ICD-10-CM

## 2017-04-20 DIAGNOSIS — N13.8 BPH WITH URINARY OBSTRUCTION: Primary | ICD-10-CM

## 2017-04-20 DIAGNOSIS — R35.1 NOCTURIA: ICD-10-CM

## 2017-04-20 LAB
BILIRUB BLD-MCNC: NEGATIVE MG/DL
CLARITY, POC: CLEAR
COLOR UR: YELLOW
GLUCOSE UR STRIP-MCNC: ABNORMAL MG/DL
KETONES UR QL: NEGATIVE
LEUKOCYTE EST, POC: NEGATIVE
NITRITE UR-MCNC: NEGATIVE MG/ML
PH UR: 5.5 [PH] (ref 5–8)
PROT UR STRIP-MCNC: NEGATIVE MG/DL
RBC # UR STRIP: NEGATIVE /UL
SP GR UR: 1.01 (ref 1–1.03)
UROBILINOGEN UR QL: NORMAL

## 2017-04-20 PROCEDURE — 81003 URINALYSIS AUTO W/O SCOPE: CPT | Performed by: UROLOGY

## 2017-04-20 PROCEDURE — 99213 OFFICE O/P EST LOW 20 MIN: CPT | Performed by: UROLOGY

## 2017-04-20 RX ORDER — SIMVASTATIN 40 MG
TABLET ORAL
COMMUNITY
Start: 2016-04-07

## 2017-04-20 RX ORDER — NITROGLYCERIN 0.4 MG/1
TABLET SUBLINGUAL
COMMUNITY
Start: 2016-08-31

## 2017-04-20 RX ORDER — VITAMIN E 268 MG
CAPSULE ORAL
COMMUNITY

## 2017-04-20 RX ORDER — SPIRONOLACTONE 25 MG/1
TABLET ORAL
COMMUNITY
Start: 2017-01-10

## 2017-04-20 RX ORDER — FUROSEMIDE 40 MG/1
TABLET ORAL
COMMUNITY
Start: 2017-01-10

## 2017-04-20 RX ORDER — ATORVASTATIN CALCIUM 40 MG/1
TABLET, FILM COATED ORAL
COMMUNITY
Start: 2016-09-08 | End: 2017-04-20

## 2017-04-20 RX ORDER — PIOGLITAZONEHYDROCHLORIDE 45 MG/1
TABLET ORAL
COMMUNITY

## 2017-04-20 RX ORDER — OXYBUTYNIN CHLORIDE 15 MG/1
15 TABLET, EXTENDED RELEASE ORAL DAILY
Qty: 8 TABLET | Refills: 0 | Status: SHIPPED | OUTPATIENT
Start: 2017-04-20 | End: 2017-04-24 | Stop reason: SDUPTHER

## 2017-04-20 RX ORDER — METOPROLOL TARTRATE 50 MG/1
TABLET, FILM COATED ORAL
COMMUNITY
Start: 2017-03-24

## 2017-04-20 RX ORDER — ASPIRIN 325 MG
TABLET ORAL
COMMUNITY

## 2017-04-20 RX ORDER — GLIPIZIDE 10 MG/1
TABLET, FILM COATED, EXTENDED RELEASE ORAL
COMMUNITY

## 2017-04-20 RX ORDER — RAMIPRIL 5 MG/1
CAPSULE ORAL
COMMUNITY
Start: 2017-02-08

## 2017-04-20 RX ORDER — M-VIT,TX,IRON,MINS/CALC/FOLIC 27MG-0.4MG
TABLET ORAL
COMMUNITY

## 2017-04-20 RX ORDER — OXYBUTYNIN CHLORIDE 15 MG/1
TABLET, EXTENDED RELEASE ORAL
COMMUNITY
End: 2017-04-24

## 2017-04-20 NOTE — PROGRESS NOTES
Subjective    Mr. Montiel is 66 y.o. male    CHIEF COMPLAINT:BPH    The patient comes in today for follow-up of BPH as a way score today is 10 I he did not RE other he denies any gross hematuria there is no flank pain he still has some mild underlying urgency and frequency but for the most part he can sleep all night.    He denies any urinary tract infection since we seen in his PSA today is 1.1    We are also following him for an overactive irritable bladder he does take Ditropan XL 10 mg needs a refill refill of that this seemed to keep his symptoms stable and again the he is very pleased with this as far as his irritable bladder symptoms    History of Present Illness      The following portions of the patient's history were reviewed and updated as appropriate: allergies, current medications, past family history, past medical history, past social history, past surgical history and problem list.    Review of Systems   Constitutional: Negative.  Negative for chills and fever.   Gastrointestinal: Negative for abdominal distention, abdominal pain, anal bleeding, blood in stool and nausea.   Genitourinary: Positive for frequency and urgency. Negative for difficulty urinating, dysuria, flank pain and hematuria.   Psychiatric/Behavioral: Negative.  Negative for agitation and confusion.         Current Outpatient Prescriptions:   •  aspirin 325 MG tablet, Take  by mouth., Disp: , Rfl:   •  furosemide (LASIX) 40 MG tablet, Take  by mouth., Disp: , Rfl:   •  glipiZIDE (GLUCOTROL) 10 MG 24 hr tablet, Take  by mouth., Disp: , Rfl:   •  metFORMIN (GLUCOPHAGE) 1000 MG tablet, Take  by mouth., Disp: , Rfl:   •  metoprolol tartrate (LOPRESSOR) 50 MG tablet, Take  by mouth., Disp: , Rfl:   •  nitroglycerin (NITROSTAT) 0.4 MG SL tablet, Place  under the tongue Every 5 (Five) Minutes., Disp: , Rfl:   •  oxybutynin XL (DITROPAN XL) 15 MG 24 hr tablet, Take  by mouth., Disp: , Rfl:   •  oxybutynin XL (DITROPAN XL) 15 MG 24 hr tablet,  "Take 1 tablet by mouth Daily., Disp: 8 tablet, Rfl: 0  •  pioglitazone (ACTOS) 45 MG tablet, Take  by mouth., Disp: , Rfl:   •  ramipril (ALTACE) 5 MG capsule, Take  by mouth., Disp: , Rfl:   •  simvastatin (ZOCOR) 40 MG tablet, Take  by mouth., Disp: , Rfl:   •  spironolactone (ALDACTONE) 25 MG tablet, Take  by mouth., Disp: , Rfl:   •  therapeutic multivitamin-minerals (THERAGRAN-M) tablet, Take  by mouth., Disp: , Rfl:   •  vitamin E 400 UNIT capsule, Take  by mouth., Disp: , Rfl:     Past Medical History:   Diagnosis Date   • Cancer     spot on nose   • Diabetes mellitus    • Heart problem    • Hyperlipidemia    • Hypertension        Past Surgical History:   Procedure Laterality Date   • APPENDECTOMY     • CORONARY ARTERY BYPASS GRAFT     • NOSE SURGERY      cancer removed   • PENIS SURGERY      staph infection       Social History     Social History   • Marital status: Unknown     Spouse name: N/A   • Number of children: N/A   • Years of education: N/A     Social History Main Topics   • Smoking status: Former Smoker   • Smokeless tobacco: None   • Alcohol use No   • Drug use: None   • Sexual activity: Not Asked     Other Topics Concern   • None     Social History Narrative   • None       Family History   Problem Relation Age of Onset   • No Known Problems Father    • No Known Problems Mother        Objective    /74  Temp 97.5 °F (36.4 °C)  Ht 72\" (182.9 cm)  Wt 225 lb 9.6 oz (102 kg)  BMI 30.6 kg/m2    Physical Exam      Orders Only on 04/13/2017   Component Date Value Ref Range Status   • PSA 04/13/2017 1.110  0.000 - 4.000 ng/mL Final       Results for orders placed or performed in visit on 04/20/17   POC Urinalysis Dipstick, Automated   Result Value Ref Range    Color Yellow Yellow, Straw, Dark Yellow, Selina    Clarity, UA Clear Clear    Glucose,  mg/dL (A) Negative, 1000 mg/dL (3+) mg/dL    Bilirubin Negative Negative    Ketones, UA Negative Negative    Specific Gravity  1.015 1.005 - 1.030 "    Blood, UA Negative Negative    pH, Urine 5.5 5.0 - 8.0    Protein, POC Negative Negative mg/dL    Urobilinogen, UA Normal Normal    Leukocytes Negative Negative    Nitrite, UA Negative Negative       Assessment and Plan    Eduardo was seen today for benign prostatic hypertrophy.    Diagnoses and all orders for this visit:    BPH with urinary obstruction  -     POC Urinalysis Dipstick, Automated    Frequency of urination    Nocturia    BPH (benign prostatic hypertrophy) with urinary obstruction  -     oxybutynin XL (DITROPAN XL) 15 MG 24 hr tablet; Take 1 tablet by mouth Daily.     plan--from a BPH point review we are going to seen back again he will year we will refill his.  His CHECK a PSA again at that point he has any increase or change in his symptoms she will let us know    From an overactive bladder point review we will refill his medications for him and we will seen back in a year get any change please call    EMR Dragon/Transcription disclaimer:  Much of this encounter note is an electronic transcription/translation of spoken language to printed text. The electronic translation of spoken language may permit erroneous, or at times, nonsensical words or phrases to be inadvertently transcribed; although I have reviewed the note for such errors, some may still exist.

## 2017-04-24 DIAGNOSIS — N13.8 BPH (BENIGN PROSTATIC HYPERTROPHY) WITH URINARY OBSTRUCTION: ICD-10-CM

## 2017-04-24 DIAGNOSIS — N40.1 BPH (BENIGN PROSTATIC HYPERTROPHY) WITH URINARY OBSTRUCTION: ICD-10-CM

## 2017-04-24 RX ORDER — OXYBUTYNIN CHLORIDE 15 MG/1
15 TABLET, EXTENDED RELEASE ORAL DAILY
Qty: 90 TABLET | Refills: 3 | Status: SHIPPED | OUTPATIENT
Start: 2017-04-24 | End: 2018-04-24 | Stop reason: SDUPTHER

## 2017-04-24 NOTE — TELEPHONE ENCOUNTER
Called and spoke with patient and let him know that i called in his RX for him to [facility] pharmacy for a 90 day supply. ML

## 2017-04-25 ENCOUNTER — RESULTS ENCOUNTER (OUTPATIENT)
Dept: UROLOGY | Facility: CLINIC | Age: 67
End: 2017-04-25

## 2017-04-25 DIAGNOSIS — N40.1 BPH WITH URINARY OBSTRUCTION: ICD-10-CM

## 2017-04-25 DIAGNOSIS — N13.8 BPH WITH URINARY OBSTRUCTION: ICD-10-CM

## 2017-05-03 RX ORDER — SPIRONOLACTONE 25 MG/1
TABLET ORAL
Qty: 30 TABLET | Refills: 5 | Status: SHIPPED | OUTPATIENT
Start: 2017-05-03 | End: 2017-11-01 | Stop reason: SDUPTHER

## 2017-07-20 RX ORDER — PIOGLITAZONEHYDROCHLORIDE 45 MG/1
TABLET ORAL
Qty: 30 TABLET | Refills: 2 | Status: SHIPPED | OUTPATIENT
Start: 2017-07-20 | End: 2017-10-18 | Stop reason: SDUPTHER

## 2017-07-21 ENCOUNTER — TELEPHONE (OUTPATIENT)
Dept: INTERNAL MEDICINE | Age: 67
End: 2017-07-21

## 2017-08-02 DIAGNOSIS — E78.5 HYPERLIPIDEMIA: ICD-10-CM

## 2017-08-03 RX ORDER — ATORVASTATIN CALCIUM 40 MG/1
40 TABLET, FILM COATED ORAL DAILY
Qty: 90 TABLET | Refills: 3 | Status: SHIPPED | OUTPATIENT
Start: 2017-08-03 | End: 2017-10-12 | Stop reason: ALTCHOICE

## 2017-08-03 RX ORDER — SIMVASTATIN 40 MG
TABLET ORAL
Qty: 30 TABLET | Refills: 3 | Status: SHIPPED | OUTPATIENT
Start: 2017-08-03 | End: 2017-11-30 | Stop reason: SDUPTHER

## 2017-08-16 RX ORDER — GLIPIZIDE 10 MG/1
TABLET, FILM COATED, EXTENDED RELEASE ORAL
Qty: 90 TABLET | Refills: 1 | Status: SHIPPED | OUTPATIENT
Start: 2017-08-16 | End: 2018-01-31 | Stop reason: SDUPTHER

## 2017-08-29 LAB
ALBUMIN SERPL-MCNC: 4.1 G/DL (ref 3.5–5.2)
ALP BLD-CCNC: 50 U/L (ref 40–130)
ALT SERPL-CCNC: 18 U/L (ref 5–41)
ANION GAP SERPL CALCULATED.3IONS-SCNC: 16 MMOL/L (ref 7–19)
AST SERPL-CCNC: 15 U/L (ref 5–40)
BASOPHILS ABSOLUTE: 0.1 K/UL (ref 0–0.2)
BASOPHILS RELATIVE PERCENT: 1.1 % (ref 0–1)
BILIRUB SERPL-MCNC: 0.3 MG/DL (ref 0.2–1.2)
BUN BLDV-MCNC: 28 MG/DL (ref 8–23)
CALCIUM SERPL-MCNC: 9.2 MG/DL (ref 8.8–10.2)
CHLORIDE BLD-SCNC: 98 MMOL/L (ref 98–111)
CHOLESTEROL, TOTAL: 175 MG/DL (ref 160–199)
CO2: 25 MMOL/L (ref 22–29)
CREAT SERPL-MCNC: 0.9 MG/DL (ref 0.5–1.2)
EOSINOPHILS ABSOLUTE: 0.1 K/UL (ref 0–0.6)
EOSINOPHILS RELATIVE PERCENT: 1.9 % (ref 0–5)
GFR NON-AFRICAN AMERICAN: >60
GLUCOSE BLD-MCNC: 204 MG/DL (ref 74–109)
HBA1C MFR BLD: 8.4 %
HCT VFR BLD CALC: 40 % (ref 42–52)
HDLC SERPL-MCNC: 34 MG/DL (ref 55–121)
HEMOGLOBIN: 13.1 G/DL (ref 14–18)
LDL CHOLESTEROL CALCULATED: 89 MG/DL
LYMPHOCYTES ABSOLUTE: 1.7 K/UL (ref 1.1–4.5)
LYMPHOCYTES RELATIVE PERCENT: 29.7 % (ref 20–40)
MCH RBC QN AUTO: 32 PG (ref 27–31)
MCHC RBC AUTO-ENTMCNC: 32.8 G/DL (ref 33–37)
MCV RBC AUTO: 97.6 FL (ref 80–94)
MONOCYTES ABSOLUTE: 0.5 K/UL (ref 0–0.9)
MONOCYTES RELATIVE PERCENT: 9 % (ref 0–10)
NEUTROPHILS ABSOLUTE: 3.3 K/UL (ref 1.5–7.5)
NEUTROPHILS RELATIVE PERCENT: 57.8 % (ref 50–65)
PDW BLD-RTO: 12.5 % (ref 11.5–14.5)
PLATELET # BLD: 236 K/UL (ref 130–400)
PMV BLD AUTO: 9.7 FL (ref 9.4–12.4)
POTASSIUM SERPL-SCNC: 4.9 MMOL/L (ref 3.5–5)
PROSTATE SPECIFIC ANTIGEN: 0.9 NG/ML (ref 0–4)
RBC # BLD: 4.1 M/UL (ref 4.7–6.1)
SODIUM BLD-SCNC: 139 MMOL/L (ref 136–145)
TOTAL PROTEIN: 6.8 G/DL (ref 6.6–8.7)
TRIGL SERPL-MCNC: 258 MG/DL (ref 150–199)
WBC # BLD: 5.7 K/UL (ref 4.8–10.8)

## 2017-08-30 RX ORDER — FUROSEMIDE 40 MG/1
TABLET ORAL
Qty: 30 TABLET | Refills: 5 | Status: SHIPPED | OUTPATIENT
Start: 2017-08-30 | End: 2018-02-28 | Stop reason: SDUPTHER

## 2017-09-05 ENCOUNTER — OFFICE VISIT (OUTPATIENT)
Dept: INTERNAL MEDICINE | Age: 67
End: 2017-09-05
Payer: MEDICARE

## 2017-09-05 VITALS
SYSTOLIC BLOOD PRESSURE: 130 MMHG | DIASTOLIC BLOOD PRESSURE: 60 MMHG | HEART RATE: 61 BPM | WEIGHT: 225 LBS | OXYGEN SATURATION: 98 % | BODY MASS INDEX: 30.48 KG/M2 | HEIGHT: 72 IN

## 2017-09-05 DIAGNOSIS — I10 ESSENTIAL HYPERTENSION: Chronic | ICD-10-CM

## 2017-09-05 DIAGNOSIS — E78.2 MIXED HYPERLIPIDEMIA: Chronic | ICD-10-CM

## 2017-09-05 DIAGNOSIS — J44.9 CHRONIC OBSTRUCTIVE PULMONARY DISEASE, UNSPECIFIED COPD TYPE (HCC): Chronic | ICD-10-CM

## 2017-09-05 DIAGNOSIS — E11.8 TYPE 2 DIABETES MELLITUS WITH COMPLICATION, WITHOUT LONG-TERM CURRENT USE OF INSULIN (HCC): Primary | Chronic | ICD-10-CM

## 2017-09-05 PROCEDURE — 4040F PNEUMOC VAC/ADMIN/RCVD: CPT | Performed by: INTERNAL MEDICINE

## 2017-09-05 PROCEDURE — 3017F COLORECTAL CA SCREEN DOC REV: CPT | Performed by: INTERNAL MEDICINE

## 2017-09-05 PROCEDURE — 1123F ACP DISCUSS/DSCN MKR DOCD: CPT | Performed by: INTERNAL MEDICINE

## 2017-09-05 PROCEDURE — G8427 DOCREV CUR MEDS BY ELIG CLIN: HCPCS | Performed by: INTERNAL MEDICINE

## 2017-09-05 PROCEDURE — G8926 SPIRO NO PERF OR DOC: HCPCS | Performed by: INTERNAL MEDICINE

## 2017-09-05 PROCEDURE — 1036F TOBACCO NON-USER: CPT | Performed by: INTERNAL MEDICINE

## 2017-09-05 PROCEDURE — 99215 OFFICE O/P EST HI 40 MIN: CPT | Performed by: INTERNAL MEDICINE

## 2017-09-05 PROCEDURE — 3046F HEMOGLOBIN A1C LEVEL >9.0%: CPT | Performed by: INTERNAL MEDICINE

## 2017-09-05 PROCEDURE — 3023F SPIROM DOC REV: CPT | Performed by: INTERNAL MEDICINE

## 2017-09-05 PROCEDURE — G8419 CALC BMI OUT NRM PARAM NOF/U: HCPCS | Performed by: INTERNAL MEDICINE

## 2017-09-05 PROCEDURE — G8598 ASA/ANTIPLAT THER USED: HCPCS | Performed by: INTERNAL MEDICINE

## 2017-09-05 ASSESSMENT — PATIENT HEALTH QUESTIONNAIRE - PHQ9
1. LITTLE INTEREST OR PLEASURE IN DOING THINGS: 0
2. FEELING DOWN, DEPRESSED OR HOPELESS: 0
SUM OF ALL RESPONSES TO PHQ QUESTIONS 1-9: 0
SUM OF ALL RESPONSES TO PHQ9 QUESTIONS 1 & 2: 0

## 2017-09-05 ASSESSMENT — ENCOUNTER SYMPTOMS
BLOOD IN STOOL: 0
ABDOMINAL DISTENTION: 0
EYE DISCHARGE: 0
NAUSEA: 0
VOMITING: 0
EYE ITCHING: 0
ABDOMINAL PAIN: 0
TROUBLE SWALLOWING: 0
SORE THROAT: 0
SHORTNESS OF BREATH: 0
BACK PAIN: 0
WHEEZING: 0

## 2017-10-07 ENCOUNTER — OFFICE VISIT (OUTPATIENT)
Dept: RETAIL CLINIC | Facility: CLINIC | Age: 67
End: 2017-10-07

## 2017-10-07 DIAGNOSIS — Z23 NEED FOR VACCINATION: Primary | ICD-10-CM

## 2017-10-07 NOTE — PROGRESS NOTES
Presents for flu vaccine.  See scanned documents.  Patient reports egg whites makes him sick to his stomach, but the yolk does not.  Advised to remain in facility after injection.  Patient declined. He has had Flu shots in the past without difficulty.

## 2017-10-12 ENCOUNTER — OFFICE VISIT (OUTPATIENT)
Dept: CARDIOLOGY | Age: 67
End: 2017-10-12
Payer: MEDICARE

## 2017-10-12 VITALS
BODY MASS INDEX: 30.48 KG/M2 | DIASTOLIC BLOOD PRESSURE: 56 MMHG | HEIGHT: 72 IN | SYSTOLIC BLOOD PRESSURE: 104 MMHG | WEIGHT: 225 LBS | HEART RATE: 66 BPM

## 2017-10-12 DIAGNOSIS — E78.2 MIXED HYPERLIPIDEMIA: Chronic | ICD-10-CM

## 2017-10-12 DIAGNOSIS — I10 ESSENTIAL HYPERTENSION: Chronic | ICD-10-CM

## 2017-10-12 DIAGNOSIS — I25.10 CORONARY ARTERY DISEASE INVOLVING NATIVE CORONARY ARTERY OF NATIVE HEART WITHOUT ANGINA PECTORIS: Primary | Chronic | ICD-10-CM

## 2017-10-12 DIAGNOSIS — Z95.1 S/P CABG (CORONARY ARTERY BYPASS GRAFT): ICD-10-CM

## 2017-10-12 PROCEDURE — 3017F COLORECTAL CA SCREEN DOC REV: CPT | Performed by: NURSE PRACTITIONER

## 2017-10-12 PROCEDURE — 1036F TOBACCO NON-USER: CPT | Performed by: NURSE PRACTITIONER

## 2017-10-12 PROCEDURE — 4040F PNEUMOC VAC/ADMIN/RCVD: CPT | Performed by: NURSE PRACTITIONER

## 2017-10-12 PROCEDURE — G8598 ASA/ANTIPLAT THER USED: HCPCS | Performed by: NURSE PRACTITIONER

## 2017-10-12 PROCEDURE — 99213 OFFICE O/P EST LOW 20 MIN: CPT | Performed by: NURSE PRACTITIONER

## 2017-10-12 PROCEDURE — 1123F ACP DISCUSS/DSCN MKR DOCD: CPT | Performed by: NURSE PRACTITIONER

## 2017-10-12 PROCEDURE — G8427 DOCREV CUR MEDS BY ELIG CLIN: HCPCS | Performed by: NURSE PRACTITIONER

## 2017-10-12 PROCEDURE — G8484 FLU IMMUNIZE NO ADMIN: HCPCS | Performed by: NURSE PRACTITIONER

## 2017-10-12 PROCEDURE — G8417 CALC BMI ABV UP PARAM F/U: HCPCS | Performed by: NURSE PRACTITIONER

## 2017-10-12 NOTE — PROGRESS NOTES
EF in excesss 60%    CARDIAC CATHETERIZATION  7/22/98    EF 45%    CORONARY ARTERY BYPASS GRAFT  2/27/01    x3, LIMA to LAD,SVG Y graft to 1st & 2nd Obtuse yeimy branches     Family History   Problem Relation Age of Onset    Stroke Father      Social History   Substance Use Topics    Smoking status: Former Smoker     Packs/day: 1.00     Years: 45.00     Types: Cigarettes     Quit date: 1/10/2017    Smokeless tobacco: Former User    Alcohol use Yes      Comment: occ beer      Current Outpatient Prescriptions   Medication Sig Dispense Refill    insulin glargine (LANTUS SOLOSTAR) 100 UNIT/ML injection pen Inject 10 Units into the skin nightly 5 Pen 3    Insulin Pen Needle 32G X 4 MM MISC 1 each by Does not apply route daily 100 each 3    glucose blood VI test strips (ASCENSIA AUTODISC VI;ONE TOUCH ULTRA TEST VI) strip 1 each by In Vitro route 2 times daily As needed.  100 each 3    furosemide (LASIX) 40 MG tablet TAKE ONE TABLET BY MOUTH ONCE DAILY 30 tablet 5    glipiZIDE (GLUCOTROL XL) 10 MG extended release tablet TAKE ONE TABLET BY MOUTH ONCE DAILY 90 tablet 1    simvastatin (ZOCOR) 40 MG tablet TAKE ONE TABLET BY MOUTH ONCE DAILY 30 tablet 3    metFORMIN (GLUCOPHAGE) 1000 MG tablet TAKE ONE TABLET BY MOUTH TWICE DAILY 60 tablet 2    pioglitazone (ACTOS) 45 MG tablet TAKE ONE TABLET BY MOUTH ONCE DAILY 30 tablet 2    spironolactone (ALDACTONE) 25 MG tablet TAKE ONE TABLET BY MOUTH ONCE DAILY 30 tablet 5    metoprolol tartrate (LOPRESSOR) 50 MG tablet Take 1 tablet by mouth 2 times daily 180 tablet 3    ramipril (ALTACE) 5 MG capsule Take 1 capsule by mouth daily 90 capsule 3    Multiple Vitamins-Minerals (THERAPEUTIC MULTIVITAMIN-MINERALS) tablet Take 1 tablet by mouth daily Alive mens 50+      oxybutynin (DITROPAN XL) 15 MG extended release tablet Take 15 mg by mouth daily      nitroGLYCERIN (NITROSTAT) 0.4 MG SL tablet Place 1 tablet under the tongue every 5 minutes (Patient taking groomed. No acute distress. Body habitus - Body mass index is 30.52 kg/m². HEENT  Head is normocephalic. No circumoral cyanosis. Dentition is normal.  EYES -   Lids normal without ptosis. No discharge, edema or subconjunctival hemorrhage. Neck - Symmetrical without apparent mass or lymphadenopathy. Respiratory - Normal respiratory effort without use of accessory muscles. Ausculatation reveals vesicular breath sounds without crackles, wheezes, rub or rhonchi. Cardiovascular  No jugular venous distention. Auscultation reveals regular rate and rhythm. No audible clicks, gallop or rub. No murmur. No lower extremity varicosities. No carotid bruits. Abdominal -  No visible distention, mass or pulsations. Extremities - No clubbing or cyanosis. No statis dermatitis or ulcers. No edema. Musculoskeletal -   No Osler's nodes. No kyphosis or scoliosis. Gait is even and regular without limp or shuffle. Ambulates without assistance. Skin -  Warm and dry; no rash or pallor. No new surgical wound. Neurological - No focal neurological deficits. Thought processes coherent. No apparent tremor. Oriented to person, place and time. Psychiatric -  Appropriate affect and mood. Assessment:    1. Coronary artery disease involving native coronary artery of native heart without angina pectoris     2. Essential hypertension     3. Mixed hyperlipidemia     4. S/P CABG (coronary artery bypass graft)       2D echo report reviewed in office today:  DATE OF SERVICE:  01/08/2017   M-MODE AND 2-DIMENSIONAL ECHOCARDIOGRAM:  1. The aortic root appears normal.  2.  Sclerotic changes of the aortic valve with normal leaflet mobility. 3.  Calcification of the mitral valve annulus with normal leaflet mobility. 4.  The tricuspid leaflets have normal thickness and mobility. 5.  Mild concentric left ventricular hypertrophy with normal left ventricular  chamber size.   There is mild lateral hypokinesis with an estimated left  ventricular ejection fraction of about 45%. The left ventricular ejection  fraction is calculated to be 43%. 6.  Normal right ventricular size and wall motion. 7.  Right atrial enlargement. 8.  Left atrial enlargement. 9.  No pericardial effusion or intracavitary thrombus noted.   COLOR FLOW AND DOPPLER WAVEFORM ANALYSIS:  1.  No definite aortic stenosis or insufficiency noted. 2.  The mitral inflow pattern appeared normal.  There is mild mitral  regurgitation. 3.  There is mild tricuspid regurgitation. The right ventricular systolic  pressure is elevated and estimated to be approximately 60 mmHg.   IMPRESSION:  1. Mild lateral hypokinesis with a left ventricular ejection fraction of 43%  to 45%. 2.  Biatrial enlargement. 3.  Sclerotic changes of the aortic valve. 4.  Mitral annular calcification. 5.  Mild mitral and tricuspid regurgitation. 6.  Elevated right ventricular systolic pressure of 60 mmHg. 1/10/17 Dobutamine stress echo:  IMPRESSION:  Dobutamine stress echo revealing resting global hypokinesis  without clinical, electrocardiographic or echocardiographic evidence of  myocardial ischemia. Stable CV status without symptoms of overt heart failure, arrhythmia or angina. Patient is compliant with medication regimen. BP Readings from Last 3 Encounters:   10/12/17 (!) 104/56   09/05/17 130/60   03/15/17 138/82    Pulse Readings from Last 3 Encounters:   10/12/17 66   09/05/17 61   01/10/17 74        Wt Readings from Last 3 Encounters:   10/12/17 225 lb (102.1 kg)   09/05/17 225 lb (102.1 kg)   03/15/17 220 lb (99.8 kg)     Plan  Previous cardiac history and records reviewed. Continue current medications as prescribed. Continue to follow up with primary care provider for non cardiac medical problems. Call the office with any problems, questions or concerns at [phone]. Follow up as scheduled with your cardiologist - Dr. Maia Gracia as scheduled.   The following

## 2017-10-12 NOTE — PATIENT INSTRUCTIONS
Continue current medications as prescribed. Continue to follow up with primary care provider for non cardiac medical problems. Call the office with any problems, questions or concerns at [phone]. Follow up as scheduled with your cardiologist - Dr. Luanne Cedeño as scheduled. The following educational material has been included in this after visit summary for your review: heart health.     Additional instructions:  Coronary artery disease risk factors you can control: Smoking, high blood pressure, high cholesterol, diabetes, being overweight, lack of exercise and stress. Continue heart healthy diet. Take medications as directed. Exercise as tolerated. Strive for 15 minutes of exercise most days of the week. If asked to keep a blood pressure log, do so for 2 weeks. Call the office to report readings at [phone]. Blood pressure goal is 140/90 or less. If you are a diabetic, the goal is 130/80 or less. If you are taking cholesterol lowering medications, it is recommended that lab work be checked annually. Always keep a current medication list. Bring your medications to every office visit.      How to take:  NITROGLYCERIN (Nitrostat) 0.4 mg tablets, sublingual.  Nitroglycerin is in a group of drugs called nitrates. Nitroglycerin dilates (widens) blood vessels, making it easier for blood to flow through them and easier for the heart to pump. Dosing Guidelines for Nitroglycerin Tablets  · At the start of an angina (chest pain) attack, place one tablet under the tongue or between the cheek and gum. Do not swallow or chew the tablet; let it dissolve on its own. If necessary, a second and third tablet may be used, with five minutes between using each tablet. If you use a third tablet and your chest pain continues, it is time to seek immediate medical attention. Call 911 immediately and have someone drive you to the emergency room.   You may be having a heart attack or other serious heart problem. · To prevent angina from exercise or stress, use 1 tablet 5 to 10 minutes before the activity. Patient Education        A Healthy Heart: Care Instructions  Your Care Instructions    Heart disease occurs when a substance called plaque builds up in the vessels that supply oxygen-rich blood to your heart. This can narrow the blood vessels and reduce blood flow. A heart attack happens when blood flow is completely blocked. A high-fat diet, smoking, and other factors increase the risk of heart disease. Your doctor has found that you have a chance of having heart disease. You can do lots of things to keep your heart healthy. It may not be easy, but you can change your diet, exercise more, and quit smoking. These steps really work to lower your chance of heart disease. Follow-up care is a key part of your treatment and safety. Be sure to make and go to all appointments, and call your doctor if you are having problems. It's also a good idea to know your test results and keep a list of the medicines you take. How can you care for yourself at home? Diet  · Use less salt when you cook and eat. This helps lower your blood pressure. Taste food before salting. Add only a little salt when you think you need it. With time, your taste buds will adjust to less salt. · Eat fewer snack items, fast foods, canned soups, and other high-salt, high-fat, processed foods. · Read food labels and try to avoid saturated and trans fats. They increase your risk of heart disease by raising cholesterol levels. · Limit the amount of solid fat-butter, margarine, and shortening-you eat. Use olive, peanut, or canola oil when you cook. Bake, broil, and steam foods instead of frying them. · Eating fish can lower your risk for heart disease. Eat at least 2 servings of fish a week. [place], mackerel, herring, sardines, and chunk light tuna are very good choices. These fish contain omega-3 fatty acids.   · Eat a variety of fruit and vegetables every day. Dark green, deep orange, red, or yellow fruits and vegetables are especially good for you. Examples include spinach, carrots, peaches, and berries. · Foods high in fiber can reduce your cholesterol and provide important vitamins and minerals. High-fiber foods include whole-grain cereals and breads, oatmeal, beans, brown rice, citrus fruits, and apples. · Limit drinks and foods with added sugar. These include candy, desserts, and soda pop. Lifestyle changes  · If your doctor recommends it, get more exercise. Walking is a good choice. Bit by bit, increase the amount you walk every day. Try for at least 30 minutes on most days of the week. You also may want to swim, bike, or do other activities. · Do not smoke. If you need help quitting, talk to your doctor about stop-smoking programs and medicines. These can increase your chances of quitting for good. Quitting smoking may be the most important step you can take to protect your heart. It is never too late to quit. You will get health benefits right away. · Limit alcohol to 2 drinks a day for men and 1 drink a day for women. Too much alcohol can cause health problems. Medicines  · Take your medicines exactly as prescribed. Call your doctor if you think you are having a problem with your medicine. · If your doctor recommends aspirin, take the amount directed each day. Make sure you take aspirin and not another kind of pain reliever, such as acetaminophen (Tylenol). If you take ibuprofen (such as Advil or Motrin) for other problems, take aspirin at least 2 hours before taking ibuprofen. When should you call for help? Call 911 if you have symptoms of a heart attack. These may include:  · You have chest pain or pressure. This may occur with:  ¨ Chest pain or pressure, or a strange feeling in the chest.  ¨ Sweating. ¨ Shortness of breath.   ¨ Pain, pressure, or a strange feeling in the back, neck, jaw, or upper belly or in one or both shoulders

## 2017-10-19 RX ORDER — PIOGLITAZONEHYDROCHLORIDE 45 MG/1
TABLET ORAL
Qty: 30 TABLET | Refills: 2 | Status: SHIPPED | OUTPATIENT
Start: 2017-10-19 | End: 2018-01-17 | Stop reason: SDUPTHER

## 2017-10-30 RX ORDER — LANCETS 30 GAUGE
1 EACH MISCELLANEOUS 2 TIMES DAILY
Qty: 100 EACH | Refills: 3 | Status: SHIPPED | OUTPATIENT
Start: 2017-10-30 | End: 2017-11-09 | Stop reason: SDUPTHER

## 2017-11-01 RX ORDER — SPIRONOLACTONE 25 MG/1
TABLET ORAL
Qty: 30 TABLET | Refills: 5 | Status: SHIPPED | OUTPATIENT
Start: 2017-11-01 | End: 2018-04-25 | Stop reason: SDUPTHER

## 2017-11-09 RX ORDER — LANCETS 30 GAUGE
1 EACH MISCELLANEOUS 2 TIMES DAILY
Qty: 100 EACH | Refills: 3 | Status: SHIPPED | OUTPATIENT
Start: 2017-11-09 | End: 2017-12-04 | Stop reason: SDUPTHER

## 2017-11-30 DIAGNOSIS — E78.5 HYPERLIPIDEMIA: ICD-10-CM

## 2017-11-30 RX ORDER — SIMVASTATIN 40 MG
TABLET ORAL
Qty: 30 TABLET | Refills: 5 | Status: SHIPPED | OUTPATIENT
Start: 2017-11-30 | End: 2018-05-23 | Stop reason: SDUPTHER

## 2017-11-30 NOTE — TELEPHONE ENCOUNTER
Alan Brown called requesting a refill of the below medication which has been pended for you:     Requested Prescriptions     Pending Prescriptions Disp Refills    simvastatin (ZOCOR) 40 MG tablet [Pharmacy Med Name: SIMVASTATIN 40MG    TAB] 30 tablet 5     Sig: TAKE ONE TABLET BY MOUTH ONCE DAILY       Last Appointment Date: 9/5/2017  Next Appointment Date: 3/5/2018    No Known Allergies

## 2017-12-04 RX ORDER — LANCETS 30 GAUGE
EACH MISCELLANEOUS
Qty: 100 EACH | Refills: 3 | Status: SHIPPED | OUTPATIENT
Start: 2017-12-04

## 2018-01-02 ENCOUNTER — TELEPHONE (OUTPATIENT)
Dept: INTERNAL MEDICINE | Age: 68
End: 2018-01-02

## 2018-01-02 ENCOUNTER — TELEPHONE (OUTPATIENT)
Dept: CARDIOLOGY | Age: 68
End: 2018-01-02

## 2018-01-02 NOTE — TELEPHONE ENCOUNTER
[place],  Should be fine to do a letter. She has a hx of CAD s/p CABG with left ventricular dysfunction.   Thanks, [employer]

## 2018-01-02 NOTE — TELEPHONE ENCOUNTER
Spoke with patient,  He would like a letter excusing him from jury duty sent to his house.   1/2 9:55

## 2018-01-17 RX ORDER — PIOGLITAZONEHYDROCHLORIDE 45 MG/1
TABLET ORAL
Qty: 30 TABLET | Refills: 2 | Status: SHIPPED | OUTPATIENT
Start: 2018-01-17 | End: 2018-04-11 | Stop reason: SDUPTHER

## 2018-01-17 NOTE — TELEPHONE ENCOUNTER
Shelton Sat called requesting a refill of the below medication which has been pended for you:     Requested Prescriptions     Pending Prescriptions Disp Refills    metFORMIN (GLUCOPHAGE) 1000 MG tablet [Pharmacy Med Name: METFORMIN 1000MG TAB] 60 tablet 2     Sig: TAKE ONE TABLET BY MOUTH TWICE DAILY    pioglitazone (ACTOS) 45 MG tablet [Pharmacy Med Name: PIOGLITAZONE 45MG TAB] 30 tablet 2     Sig: TAKE ONE TABLET BY MOUTH ONCE DAILY       Last Appointment Date: 9/5/2017  Next Appointment Date: 3/5/2018    No Known Allergies

## 2018-01-31 DIAGNOSIS — I10 ESSENTIAL HYPERTENSION: ICD-10-CM

## 2018-01-31 DIAGNOSIS — I25.119 CORONARY ARTERY DISEASE INVOLVING NATIVE HEART WITH ANGINA PECTORIS, UNSPECIFIED VESSEL OR LESION TYPE (HCC): ICD-10-CM

## 2018-01-31 DIAGNOSIS — I25.10 CORONARY ARTERY DISEASE INVOLVING NATIVE HEART WITHOUT ANGINA PECTORIS, UNSPECIFIED VESSEL OR LESION TYPE: ICD-10-CM

## 2018-02-01 RX ORDER — GLIPIZIDE 10 MG/1
TABLET, FILM COATED, EXTENDED RELEASE ORAL
Qty: 90 TABLET | Refills: 1 | Status: SHIPPED | OUTPATIENT
Start: 2018-02-01 | End: 2018-08-15 | Stop reason: SDUPTHER

## 2018-02-01 RX ORDER — NITROGLYCERIN 0.4 MG/1
TABLET SUBLINGUAL
Qty: 25 TABLET | Refills: 3 | Status: SHIPPED | OUTPATIENT
Start: 2018-02-01 | End: 2018-05-24 | Stop reason: SDUPTHER

## 2018-02-01 RX ORDER — RAMIPRIL 5 MG/1
CAPSULE ORAL
Qty: 90 CAPSULE | Refills: 3 | Status: SHIPPED | OUTPATIENT
Start: 2018-02-01 | End: 2019-01-30 | Stop reason: SDUPTHER

## 2018-02-26 DIAGNOSIS — E11.8 TYPE 2 DIABETES MELLITUS WITH COMPLICATION, WITHOUT LONG-TERM CURRENT USE OF INSULIN (HCC): Chronic | ICD-10-CM

## 2018-02-26 DIAGNOSIS — E78.2 MIXED HYPERLIPIDEMIA: Chronic | ICD-10-CM

## 2018-02-26 LAB
ALBUMIN SERPL-MCNC: 4 G/DL (ref 3.5–5.2)
ALP BLD-CCNC: 56 U/L (ref 40–130)
ALT SERPL-CCNC: 19 U/L (ref 5–41)
AST SERPL-CCNC: 16 U/L (ref 5–40)
BILIRUB SERPL-MCNC: 0.3 MG/DL (ref 0.2–1.2)
BILIRUBIN DIRECT: 0.1 MG/DL (ref 0–0.3)
BILIRUBIN, INDIRECT: 0.2 MG/DL (ref 0.1–1)
CHOLESTEROL, TOTAL: 164 MG/DL (ref 160–199)
HBA1C MFR BLD: 6.1 %
HDLC SERPL-MCNC: 33 MG/DL (ref 55–121)
LDL CHOLESTEROL CALCULATED: 89 MG/DL
TOTAL PROTEIN: 6.5 G/DL (ref 6.6–8.7)
TRIGL SERPL-MCNC: 212 MG/DL (ref 0–149)

## 2018-02-28 RX ORDER — FUROSEMIDE 40 MG/1
TABLET ORAL
Qty: 30 TABLET | Refills: 5 | Status: SHIPPED | OUTPATIENT
Start: 2018-02-28 | End: 2018-08-23 | Stop reason: SDUPTHER

## 2018-03-05 ENCOUNTER — OFFICE VISIT (OUTPATIENT)
Dept: INTERNAL MEDICINE | Age: 68
End: 2018-03-05
Payer: MEDICARE

## 2018-03-05 VITALS
DIASTOLIC BLOOD PRESSURE: 70 MMHG | HEIGHT: 72 IN | SYSTOLIC BLOOD PRESSURE: 130 MMHG | BODY MASS INDEX: 31.15 KG/M2 | WEIGHT: 230 LBS

## 2018-03-05 DIAGNOSIS — Z23 NEED FOR VACCINATION: ICD-10-CM

## 2018-03-05 DIAGNOSIS — I10 ESSENTIAL HYPERTENSION: Chronic | ICD-10-CM

## 2018-03-05 DIAGNOSIS — E78.2 MIXED HYPERLIPIDEMIA: Chronic | ICD-10-CM

## 2018-03-05 DIAGNOSIS — E11.8 TYPE 2 DIABETES MELLITUS WITH COMPLICATION, WITHOUT LONG-TERM CURRENT USE OF INSULIN (HCC): Primary | Chronic | ICD-10-CM

## 2018-03-05 PROBLEM — N40.1 HYPERTROPHY OF PROSTATE WITH URINARY OBSTRUCTION: Status: ACTIVE | Noted: 2017-04-20

## 2018-03-05 PROBLEM — R35.0 FREQUENCY OF URINATION: Status: ACTIVE | Noted: 2017-04-20

## 2018-03-05 PROBLEM — R35.1 NOCTURIA: Status: ACTIVE | Noted: 2017-04-20

## 2018-03-05 PROBLEM — N13.8 HYPERTROPHY OF PROSTATE WITH URINARY OBSTRUCTION: Status: ACTIVE | Noted: 2017-04-20

## 2018-03-05 PROCEDURE — G8598 ASA/ANTIPLAT THER USED: HCPCS | Performed by: INTERNAL MEDICINE

## 2018-03-05 PROCEDURE — G8484 FLU IMMUNIZE NO ADMIN: HCPCS | Performed by: INTERNAL MEDICINE

## 2018-03-05 PROCEDURE — 4040F PNEUMOC VAC/ADMIN/RCVD: CPT | Performed by: INTERNAL MEDICINE

## 2018-03-05 PROCEDURE — 1036F TOBACCO NON-USER: CPT | Performed by: INTERNAL MEDICINE

## 2018-03-05 PROCEDURE — 99214 OFFICE O/P EST MOD 30 MIN: CPT | Performed by: INTERNAL MEDICINE

## 2018-03-05 PROCEDURE — G0009 ADMIN PNEUMOCOCCAL VACCINE: HCPCS | Performed by: INTERNAL MEDICINE

## 2018-03-05 PROCEDURE — 1123F ACP DISCUSS/DSCN MKR DOCD: CPT | Performed by: INTERNAL MEDICINE

## 2018-03-05 PROCEDURE — 90670 PCV13 VACCINE IM: CPT | Performed by: INTERNAL MEDICINE

## 2018-03-05 PROCEDURE — G8427 DOCREV CUR MEDS BY ELIG CLIN: HCPCS | Performed by: INTERNAL MEDICINE

## 2018-03-05 PROCEDURE — 3044F HG A1C LEVEL LT 7.0%: CPT | Performed by: INTERNAL MEDICINE

## 2018-03-05 PROCEDURE — 3017F COLORECTAL CA SCREEN DOC REV: CPT | Performed by: INTERNAL MEDICINE

## 2018-03-05 PROCEDURE — G8417 CALC BMI ABV UP PARAM F/U: HCPCS | Performed by: INTERNAL MEDICINE

## 2018-03-05 ASSESSMENT — ENCOUNTER SYMPTOMS
TROUBLE SWALLOWING: 0
SINUS PRESSURE: 0
SHORTNESS OF BREATH: 0
ABDOMINAL DISTENTION: 0
BACK PAIN: 0
ABDOMINAL PAIN: 0
BLOOD IN STOOL: 0
VOMITING: 0
WHEEZING: 0
NAUSEA: 0
EYE ITCHING: 0
SORE THROAT: 0
EYE DISCHARGE: 0

## 2018-03-05 NOTE — PROGRESS NOTES
Visit Information    Have you changed or started any medications since your last visit including any over-the-counter medicines, vitamins, or herbal medicines? no   Are you having any side effects from any of your medications? -  no  Have you stopped taking any of your medications? Is so, why? -  no    Have you seen any other physician or provider since your last visit? No  Have you had any other diagnostic tests since your last visit? No  Have you been seen in the emergency room and/or had an admission to a hospital since we last saw you? No  Have you had your routine dental cleaning in the past 6 months? no    Have you activated your [email or web address] account? If not, what are your barriers?  No: ref     Patient Care Team:  Tex Alanis MD as PCP - General (Internal Medicine)  Adan Yanez MD (Cardiology)    Medical History Review  Past Medical, Family, and Social History reviewed and does not contribute to the patient presenting condition    Health Maintenance   Topic Date Due    AAA screen  1950    Diabetic foot exam  06/12/1958    Diabetic retinal exam  06/12/1958    Diabetic microalbuminuria test  06/12/1966    DTaP/Tdap/Td vaccine (1 - Tdap) 06/12/1967    Shingles Vaccine (1 of 2 - 2 Dose Series) 06/12/1998    Colon cancer screen colonoscopy  06/12/1998    Smoker: low dose lung CT screening  06/12/2003    Pneumococcal low/med risk (1 of 2 - PCV13) 06/12/2013    Flu vaccine (1) 09/01/2017    Potassium monitoring  08/29/2018    Creatinine monitoring  08/29/2018    A1C test (Diabetic or Prediabetic)  02/26/2019    Lipid screen  02/26/2019    Hepatitis C screen  Completed

## 2018-03-05 NOTE — PROGRESS NOTES
weakness, numbness and headaches. Hematological: Negative for adenopathy. Psychiatric/Behavioral: Negative for agitation and hallucinations. The patient is not nervous/anxious. Objective:     /70   Ht 6' (1.829 m)   Wt 230 lb (104.3 kg)   BMI 31.19 kg/m²    Physical Exam   Constitutional: He is oriented to person, place, and time. He appears well-developed and well-nourished. No distress. HENT:   Head: Normocephalic and atraumatic. Right Ear: External ear normal.   Left Ear: External ear normal.   Nose: Nose normal.   Mouth/Throat: Oropharynx is clear and moist. No oropharyngeal exudate. Eyes: Conjunctivae and EOM are normal. Pupils are equal, round, and reactive to light. Right eye exhibits no discharge. Left eye exhibits no discharge. No scleral icterus. Neck: Normal range of motion. Neck supple. No JVD present. No tracheal deviation present. No thyromegaly present. Cardiovascular: Normal rate, regular rhythm, normal heart sounds and intact distal pulses. Exam reveals no gallop and no friction rub. No murmur heard. Pulmonary/Chest: Effort normal and breath sounds normal. No respiratory distress. He has no wheezes. He has no rales. Abdominal: Soft. Bowel sounds are normal. He exhibits no distension and no mass. There is no tenderness. There is no rebound and no guarding. Musculoskeletal: Normal range of motion. He exhibits no edema, tenderness or deformity. Lymphadenopathy:     He has no cervical adenopathy. Neurological: He is alert and oriented to person, place, and time. He has normal reflexes. No cranial nerve deficit. He exhibits normal muscle tone. Coordination normal.   Skin: Skin is warm and dry. No rash noted. He is not diaphoretic. No erythema. No pallor. Psychiatric: He has a normal mood and affect. His behavior is normal. Judgment and thought content normal.        Assessment:     1.  Type 2 diabetes mellitus with complication, without long-term current use of insulin (HCC)  Basic Metabolic Panel    Hemoglobin A1C   2. Essential hypertension     3. Mixed hyperlipidemia              Plan:     Type II diabetes mellitus which is under better control. His A1c was over 8 when he was in in August and now is down to 6.1. He's got much better glycemic control and he feels fine were going continue the same doses of medication and recheck again in 3 months. Hypertension which is at goal with his current dose of medications. He is on all takes 5 mg daily and were going continue the same dose. Hypercholesterol which is at goal on his current dose of simvastatin. He's on 40 mg a day. His LDL is less than 100. His total is less than 200. His got no liver enzyme abnormalities and his no side effects with the medication were going continue the same. He stable at this point time and is had a good winter. I'm going to give him a Prevnar and a tetanus shot today. Refill his medicines. See him back in 3 months for recheck on his diabetes. Return in about 3 months (around 6/5/2018) for blood pressure check, diabetes check. Patient given educational materials - see patient instructions. Discussed use, benefit, and side effects of prescribed medications. All patient questions answered. Pt voiced understanding. Reviewed health maintenance. Instructed to continue current medications, diet and exercise. Patient agreed with treatment plan. **This report has been created using voice recognition software. It may contain minor errors which are inherent in voice recognition technology. **    Electronically signed by Rupert Muse MD on 3/5/2018 at 11:29 AM

## 2018-03-15 DIAGNOSIS — I25.10 CORONARY ARTERY DISEASE INVOLVING NATIVE HEART WITHOUT ANGINA PECTORIS, UNSPECIFIED VESSEL OR LESION TYPE: ICD-10-CM

## 2018-03-15 DIAGNOSIS — I10 ESSENTIAL HYPERTENSION: ICD-10-CM

## 2018-03-15 RX ORDER — METOPROLOL TARTRATE 50 MG/1
TABLET, FILM COATED ORAL
Qty: 180 TABLET | Refills: 3 | Status: SHIPPED | OUTPATIENT
Start: 2018-03-15 | End: 2019-03-13 | Stop reason: SDUPTHER

## 2018-04-19 DIAGNOSIS — N40.1 BPH WITH URINARY OBSTRUCTION: Primary | ICD-10-CM

## 2018-04-19 DIAGNOSIS — N13.8 BPH WITH URINARY OBSTRUCTION: Primary | ICD-10-CM

## 2018-04-19 DIAGNOSIS — R97.20 ELEVATED PROSTATE SPECIFIC ANTIGEN (PSA): ICD-10-CM

## 2018-04-19 LAB — PSA SERPL-MCNC: 1.52 NG/ML (ref 0–4)

## 2018-04-24 ENCOUNTER — OFFICE VISIT (OUTPATIENT)
Dept: UROLOGY | Facility: CLINIC | Age: 68
End: 2018-04-24

## 2018-04-24 VITALS
SYSTOLIC BLOOD PRESSURE: 122 MMHG | BODY MASS INDEX: 31.69 KG/M2 | TEMPERATURE: 97.6 F | DIASTOLIC BLOOD PRESSURE: 78 MMHG | HEIGHT: 72 IN | WEIGHT: 234 LBS

## 2018-04-24 DIAGNOSIS — N13.8 BPH WITH URINARY OBSTRUCTION: Primary | ICD-10-CM

## 2018-04-24 DIAGNOSIS — N32.81 OAB (OVERACTIVE BLADDER): ICD-10-CM

## 2018-04-24 DIAGNOSIS — R97.20 ELEVATED PROSTATE SPECIFIC ANTIGEN (PSA): ICD-10-CM

## 2018-04-24 DIAGNOSIS — N40.1 BPH WITH URINARY OBSTRUCTION: Primary | ICD-10-CM

## 2018-04-24 LAB
BILIRUB BLD-MCNC: NEGATIVE MG/DL
CLARITY, POC: CLEAR
COLOR UR: YELLOW
GLUCOSE UR STRIP-MCNC: ABNORMAL MG/DL
KETONES UR QL: NEGATIVE
LEUKOCYTE EST, POC: NEGATIVE
NITRITE UR-MCNC: NEGATIVE MG/ML
PH UR: 6 [PH] (ref 5–8)
PROT UR STRIP-MCNC: NEGATIVE MG/DL
RBC # UR STRIP: NEGATIVE /UL
SP GR UR: 1.02 (ref 1–1.03)
UROBILINOGEN UR QL: NORMAL

## 2018-04-24 PROCEDURE — 81001 URINALYSIS AUTO W/SCOPE: CPT | Performed by: UROLOGY

## 2018-04-24 PROCEDURE — 99213 OFFICE O/P EST LOW 20 MIN: CPT | Performed by: UROLOGY

## 2018-04-24 RX ORDER — OXYBUTYNIN CHLORIDE 15 MG/1
15 TABLET, EXTENDED RELEASE ORAL DAILY
Qty: 90 TABLET | Refills: 3 | Status: SHIPPED | OUTPATIENT
Start: 2018-04-24 | End: 2019-02-22 | Stop reason: ALTCHOICE

## 2018-04-24 NOTE — PROGRESS NOTES
Subjective    Mr. Montiel is 67 y.o. male    CHIEF COMPLAINT: BPH    Patient comes back today follow-up of BPH clinically he is stable as a way score today is 11 he does have some frequency urgency but not any worse ovaries had before he is not any infections he is not any gross hematuria is not had any flank pain.  His most recent PSA is stable 1.2    He has overactive irritable bladder symptoms as well for which he takes Ditropan XL I did refill that for him today his symptoms from that point he also have not worsened at all      History of Present Illness      The following portions of the patient's history were reviewed and updated as appropriate: allergies, current medications, past family history, past medical history, past social history, past surgical history and problem list.    Review of Systems   Constitutional: Negative for appetite change and fever.   HENT: Negative for hearing loss and sore throat.    Eyes: Negative for pain and redness.   Respiratory: Negative for cough and shortness of breath.    Cardiovascular: Negative for chest pain and leg swelling.   Gastrointestinal: Negative for anal bleeding, nausea and vomiting.   Endocrine: Negative for cold intolerance and heat intolerance.   Genitourinary: Positive for frequency and urgency. Negative for dysuria, flank pain and hematuria.   Musculoskeletal: Negative for joint swelling and myalgias.   Skin: Negative for color change and rash.   Allergic/Immunologic: Positive for food allergies. Negative for immunocompromised state.   Neurological: Negative for dizziness and speech difficulty.   Hematological: Negative for adenopathy. Does not bruise/bleed easily.   Psychiatric/Behavioral: Negative for dysphoric mood and suicidal ideas.         Current Outpatient Prescriptions:   •  aspirin 325 MG tablet, Take  by mouth., Disp: , Rfl:   •  furosemide (LASIX) 40 MG tablet, Take  by mouth., Disp: , Rfl:   •  glipiZIDE (GLUCOTROL) 10 MG 24 hr tablet, Take  by  mouth., Disp: , Rfl:   •  glucose blood test strip, As needed.  PT IS INSULIN DEPT AND CAN CK TWICE DAILY IF NEEDED   DX E11.9, Disp: , Rfl:   •  Insulin Glargine (LANTUS SOLOSTAR) 100 UNIT/ML injection pen, Inject  under the skin., Disp: , Rfl:   •  insulin NPH (humuLIN N,novoLIN N) 100 UNIT/ML injection, 20 UNITS IN AM AND 10 UNITS IN PM, Disp: , Rfl:   •  Insulin Pen Needle 32G X 4 MM misc, , Disp: , Rfl:   •  Insulin Syringes, Disposable, U-100 1 ML misc, , Disp: , Rfl:   •  metFORMIN (GLUCOPHAGE) 1000 MG tablet, Take  by mouth., Disp: , Rfl:   •  metoprolol tartrate (LOPRESSOR) 50 MG tablet, Take  by mouth., Disp: , Rfl:   •  nitroglycerin (NITROSTAT) 0.4 MG SL tablet, Place  under the tongue Every 5 (Five) Minutes., Disp: , Rfl:   •  oxybutynin XL (DITROPAN XL) 15 MG 24 hr tablet, Take 1 tablet by mouth Daily., Disp: 90 tablet, Rfl: 3  •  pioglitazone (ACTOS) 45 MG tablet, Take  by mouth., Disp: , Rfl:   •  ramipril (ALTACE) 5 MG capsule, Take  by mouth., Disp: , Rfl:   •  simvastatin (ZOCOR) 40 MG tablet, Take  by mouth., Disp: , Rfl:   •  spironolactone (ALDACTONE) 25 MG tablet, Take  by mouth., Disp: , Rfl:   •  therapeutic multivitamin-minerals (THERAGRAN-M) tablet, Take  by mouth., Disp: , Rfl:   •  vitamin E 400 UNIT capsule, Take  by mouth., Disp: , Rfl:     Past Medical History:   Diagnosis Date   • Cancer     spot on nose   • Diabetes mellitus    • Heart problem    • Hyperlipidemia    • Hypertension        Past Surgical History:   Procedure Laterality Date   • APPENDECTOMY     • CORONARY ARTERY BYPASS GRAFT     • NOSE SURGERY      cancer removed   • PENIS SURGERY      staph infection       Social History     Social History   • Marital status: Unknown     Social History Main Topics   • Smoking status: Current Every Day Smoker     Packs/day: 1.00     Types: Cigarettes   • Smokeless tobacco: Never Used   • Alcohol use No   • Drug use: Unknown     Other Topics Concern   • Not on file       Family History  "  Problem Relation Age of Onset   • No Known Problems Father    • No Known Problems Mother        Objective    /78   Temp 97.6 °F (36.4 °C)   Ht 182.9 cm (72\")   Wt 106 kg (234 lb)   BMI 31.74 kg/m²     Physical Exam   Constitutional: He is oriented to person, place, and time. He appears well-developed and well-nourished.   Pulmonary/Chest: Effort normal.   Abdominal: Soft. He exhibits no distension and no mass. There is no tenderness. There is no rebound and no guarding. No hernia. Hernia confirmed negative in the right inguinal area and confirmed negative in the left inguinal area.   Soft  No mass   Genitourinary: Testes normal and penis normal. Rectal exam shows no mass, no tenderness and anal tone normal. Enlarged: for the age of the patient. Right testis shows no mass, no swelling and no tenderness. Left testis shows no mass, no swelling and no tenderness. Circumcised. No hypospadias. No discharge found.   Genitourinary Comments:  The urethral meatus normal in position without evidence of stricture. Epididymis without mass or tenderness. Vas Deferens is palpably normal.Anus and perineum without mass or tenderness. The prostate is approximately 30 ml. It is Symmetric, with a Soft consistency. There are no nodules present. . The seminal vesicles are Not palpable due to the size of the prostate.     Neurological: He is alert and oriented to person, place, and time.   Vitals reviewed.        Orders Only on 04/19/2018   Component Date Value Ref Range Status   • PSA 04/19/2018 1.520  0.000 - 4.000 ng/mL Final       Results for orders placed or performed in visit on 04/24/18   POC Urinalysis Dipstick, Automated   Result Value Ref Range    Color Yellow Yellow, Straw, Dark Yellow, Selina    Clarity, UA Clear Clear    Glucose,  mg/dL (A) Negative, 1000 mg/dL (3+) mg/dL    Bilirubin Negative Negative    Ketones, UA Negative Negative    Specific Gravity  1.020 1.005 - 1.030    Blood, UA Negative Negative    " pH, Urine 6.0 5.0 - 8.0    Protein, POC Negative Negative mg/dL    Urobilinogen, UA Normal Normal    Leukocytes Negative Negative    Nitrite, UA Negative Negative       Assessment and Plan    Eduardo was seen today for benign prostatic hypertrophy.    Diagnoses and all orders for this visit:    BPH with urinary obstruction  -     POC Urinalysis Dipstick, Automated  -     oxybutynin XL (DITROPAN XL) 15 MG 24 hr tablet; Take 1 tablet by mouth Daily.  -     PSA DIAGNOSTIC; Future    Elevated prostate specific antigen (PSA)    OAB (overactive bladder)    Plan--from a BPH point review the patient is doing well we will seen back again in 1 year with a PSA.    His OAB symptoms also are stable refilled his oxybutynin for him he will continue that if he has problems she will call

## 2018-04-26 RX ORDER — SPIRONOLACTONE 25 MG/1
TABLET ORAL
Qty: 90 TABLET | Refills: 3 | Status: SHIPPED | OUTPATIENT
Start: 2018-04-26 | End: 2019-05-03 | Stop reason: SDUPTHER

## 2018-04-29 ENCOUNTER — RESULTS ENCOUNTER (OUTPATIENT)
Dept: UROLOGY | Facility: CLINIC | Age: 68
End: 2018-04-29

## 2018-04-29 DIAGNOSIS — N13.8 BPH WITH URINARY OBSTRUCTION: ICD-10-CM

## 2018-04-29 DIAGNOSIS — N40.1 BPH WITH URINARY OBSTRUCTION: ICD-10-CM

## 2018-05-23 DIAGNOSIS — E78.5 HYPERLIPIDEMIA: ICD-10-CM

## 2018-05-23 RX ORDER — SIMVASTATIN 40 MG
TABLET ORAL
Qty: 30 TABLET | Refills: 5 | Status: SHIPPED | OUTPATIENT
Start: 2018-05-23 | End: 2018-10-28 | Stop reason: SDUPTHER

## 2018-05-24 ENCOUNTER — OFFICE VISIT (OUTPATIENT)
Dept: CARDIOLOGY | Age: 68
End: 2018-05-24
Payer: MEDICARE

## 2018-05-24 VITALS
WEIGHT: 229 LBS | BODY MASS INDEX: 31.02 KG/M2 | DIASTOLIC BLOOD PRESSURE: 66 MMHG | SYSTOLIC BLOOD PRESSURE: 110 MMHG | HEIGHT: 72 IN | HEART RATE: 64 BPM

## 2018-05-24 DIAGNOSIS — I51.9 LEFT VENTRICULAR DYSFUNCTION: ICD-10-CM

## 2018-05-24 DIAGNOSIS — I10 ESSENTIAL HYPERTENSION: Chronic | ICD-10-CM

## 2018-05-24 DIAGNOSIS — I25.10 CORONARY ARTERY DISEASE INVOLVING NATIVE HEART WITHOUT ANGINA PECTORIS, UNSPECIFIED VESSEL OR LESION TYPE: Primary | ICD-10-CM

## 2018-05-24 DIAGNOSIS — E78.2 MIXED HYPERLIPIDEMIA: Chronic | ICD-10-CM

## 2018-05-24 DIAGNOSIS — Z95.1 S/P CABG (CORONARY ARTERY BYPASS GRAFT): ICD-10-CM

## 2018-05-24 PROCEDURE — 1123F ACP DISCUSS/DSCN MKR DOCD: CPT | Performed by: NURSE PRACTITIONER

## 2018-05-24 PROCEDURE — 93000 ELECTROCARDIOGRAM COMPLETE: CPT | Performed by: NURSE PRACTITIONER

## 2018-05-24 PROCEDURE — G8427 DOCREV CUR MEDS BY ELIG CLIN: HCPCS | Performed by: NURSE PRACTITIONER

## 2018-05-24 PROCEDURE — 4040F PNEUMOC VAC/ADMIN/RCVD: CPT | Performed by: NURSE PRACTITIONER

## 2018-05-24 PROCEDURE — G8598 ASA/ANTIPLAT THER USED: HCPCS | Performed by: NURSE PRACTITIONER

## 2018-05-24 PROCEDURE — 3017F COLORECTAL CA SCREEN DOC REV: CPT | Performed by: NURSE PRACTITIONER

## 2018-05-24 PROCEDURE — 4004F PT TOBACCO SCREEN RCVD TLK: CPT | Performed by: NURSE PRACTITIONER

## 2018-05-24 PROCEDURE — 99213 OFFICE O/P EST LOW 20 MIN: CPT | Performed by: NURSE PRACTITIONER

## 2018-05-24 PROCEDURE — G8417 CALC BMI ABV UP PARAM F/U: HCPCS | Performed by: NURSE PRACTITIONER

## 2018-05-24 RX ORDER — NITROGLYCERIN 0.4 MG/1
0.4 TABLET SUBLINGUAL EVERY 5 MIN PRN
Qty: 25 TABLET | Refills: 3 | Status: SHIPPED | OUTPATIENT
Start: 2018-05-24 | End: 2019-07-15 | Stop reason: SDUPTHER

## 2018-05-31 DIAGNOSIS — E11.8 TYPE 2 DIABETES MELLITUS WITH COMPLICATION, WITHOUT LONG-TERM CURRENT USE OF INSULIN (HCC): Chronic | ICD-10-CM

## 2018-05-31 LAB
ANION GAP SERPL CALCULATED.3IONS-SCNC: 15 MMOL/L (ref 7–19)
BUN BLDV-MCNC: 19 MG/DL (ref 8–23)
CALCIUM SERPL-MCNC: 9.3 MG/DL (ref 8.8–10.2)
CHLORIDE BLD-SCNC: 100 MMOL/L (ref 98–111)
CO2: 25 MMOL/L (ref 22–29)
CREAT SERPL-MCNC: 1 MG/DL (ref 0.5–1.2)
GFR NON-AFRICAN AMERICAN: >60
GLUCOSE BLD-MCNC: 138 MG/DL (ref 74–109)
HBA1C MFR BLD: 6.2 %
POTASSIUM SERPL-SCNC: 5.2 MMOL/L (ref 3.5–5)
SODIUM BLD-SCNC: 140 MMOL/L (ref 136–145)

## 2018-06-05 ENCOUNTER — OFFICE VISIT (OUTPATIENT)
Dept: INTERNAL MEDICINE | Age: 68
End: 2018-06-05
Payer: MEDICARE

## 2018-06-05 VITALS
DIASTOLIC BLOOD PRESSURE: 68 MMHG | WEIGHT: 225 LBS | BODY MASS INDEX: 30.48 KG/M2 | HEART RATE: 58 BPM | HEIGHT: 72 IN | SYSTOLIC BLOOD PRESSURE: 120 MMHG | OXYGEN SATURATION: 98 %

## 2018-06-05 DIAGNOSIS — Z12.5 ENCOUNTER FOR SCREENING FOR MALIGNANT NEOPLASM OF PROSTATE: ICD-10-CM

## 2018-06-05 DIAGNOSIS — E11.8 TYPE 2 DIABETES MELLITUS WITH COMPLICATION, WITHOUT LONG-TERM CURRENT USE OF INSULIN (HCC): Primary | Chronic | ICD-10-CM

## 2018-06-05 DIAGNOSIS — E78.2 MIXED HYPERLIPIDEMIA: Chronic | ICD-10-CM

## 2018-06-05 DIAGNOSIS — Z23 NEED FOR VACCINATION: ICD-10-CM

## 2018-06-05 DIAGNOSIS — I10 ESSENTIAL HYPERTENSION: Chronic | ICD-10-CM

## 2018-06-05 DIAGNOSIS — N40.0 BENIGN PROSTATIC HYPERPLASIA WITHOUT LOWER URINARY TRACT SYMPTOMS: ICD-10-CM

## 2018-06-05 PROCEDURE — 3017F COLORECTAL CA SCREEN DOC REV: CPT | Performed by: INTERNAL MEDICINE

## 2018-06-05 PROCEDURE — G8598 ASA/ANTIPLAT THER USED: HCPCS | Performed by: INTERNAL MEDICINE

## 2018-06-05 PROCEDURE — G8427 DOCREV CUR MEDS BY ELIG CLIN: HCPCS | Performed by: INTERNAL MEDICINE

## 2018-06-05 PROCEDURE — 99214 OFFICE O/P EST MOD 30 MIN: CPT | Performed by: INTERNAL MEDICINE

## 2018-06-05 PROCEDURE — 1123F ACP DISCUSS/DSCN MKR DOCD: CPT | Performed by: INTERNAL MEDICINE

## 2018-06-05 PROCEDURE — 4040F PNEUMOC VAC/ADMIN/RCVD: CPT | Performed by: INTERNAL MEDICINE

## 2018-06-05 PROCEDURE — 2022F DILAT RTA XM EVC RTNOPTHY: CPT | Performed by: INTERNAL MEDICINE

## 2018-06-05 PROCEDURE — 3044F HG A1C LEVEL LT 7.0%: CPT | Performed by: INTERNAL MEDICINE

## 2018-06-05 PROCEDURE — 4004F PT TOBACCO SCREEN RCVD TLK: CPT | Performed by: INTERNAL MEDICINE

## 2018-06-05 PROCEDURE — G8417 CALC BMI ABV UP PARAM F/U: HCPCS | Performed by: INTERNAL MEDICINE

## 2018-06-05 ASSESSMENT — ENCOUNTER SYMPTOMS
ABDOMINAL DISTENTION: 0
NAUSEA: 0
SORE THROAT: 0
WHEEZING: 0
TROUBLE SWALLOWING: 0
EYE ITCHING: 0
BACK PAIN: 0
VOMITING: 0
SHORTNESS OF BREATH: 0
EYE DISCHARGE: 0
SINUS PRESSURE: 0
BLOOD IN STOOL: 0
ABDOMINAL PAIN: 0

## 2018-08-15 RX ORDER — GLIPIZIDE 10 MG/1
TABLET, FILM COATED, EXTENDED RELEASE ORAL
Qty: 90 TABLET | Refills: 1 | Status: SHIPPED | OUTPATIENT
Start: 2018-08-15 | End: 2018-12-13

## 2018-08-23 RX ORDER — FUROSEMIDE 40 MG/1
TABLET ORAL
Qty: 90 TABLET | Refills: 5 | Status: SHIPPED | OUTPATIENT
Start: 2018-08-23 | End: 2019-08-28 | Stop reason: SDUPTHER

## 2018-09-06 DIAGNOSIS — I10 ESSENTIAL HYPERTENSION: Chronic | ICD-10-CM

## 2018-09-06 DIAGNOSIS — E11.8 TYPE 2 DIABETES MELLITUS WITH COMPLICATION, WITHOUT LONG-TERM CURRENT USE OF INSULIN (HCC): Chronic | ICD-10-CM

## 2018-09-06 DIAGNOSIS — E78.2 MIXED HYPERLIPIDEMIA: Chronic | ICD-10-CM

## 2018-09-06 DIAGNOSIS — Z12.5 ENCOUNTER FOR SCREENING FOR MALIGNANT NEOPLASM OF PROSTATE: ICD-10-CM

## 2018-09-06 DIAGNOSIS — N40.0 BENIGN PROSTATIC HYPERPLASIA WITHOUT LOWER URINARY TRACT SYMPTOMS: ICD-10-CM

## 2018-09-06 LAB
ALBUMIN SERPL-MCNC: 4.2 G/DL (ref 3.5–5.2)
ALP BLD-CCNC: 56 U/L (ref 40–130)
ALT SERPL-CCNC: 14 U/L (ref 5–41)
ANION GAP SERPL CALCULATED.3IONS-SCNC: 16 MMOL/L (ref 7–19)
AST SERPL-CCNC: 15 U/L (ref 5–40)
BILIRUB SERPL-MCNC: 0.3 MG/DL (ref 0.2–1.2)
BUN BLDV-MCNC: 31 MG/DL (ref 8–23)
CALCIUM SERPL-MCNC: 9.4 MG/DL (ref 8.8–10.2)
CHLORIDE BLD-SCNC: 99 MMOL/L (ref 98–111)
CHOLESTEROL, TOTAL: 144 MG/DL (ref 160–199)
CO2: 26 MMOL/L (ref 22–29)
CREAT SERPL-MCNC: 1.1 MG/DL (ref 0.5–1.2)
GFR NON-AFRICAN AMERICAN: >60
GLUCOSE BLD-MCNC: 66 MG/DL (ref 74–109)
HBA1C MFR BLD: 5.9 % (ref 4–6)
HCT VFR BLD CALC: 41.4 % (ref 42–52)
HDLC SERPL-MCNC: 30 MG/DL (ref 55–121)
HEMOGLOBIN: 13.2 G/DL (ref 14–18)
LDL CHOLESTEROL CALCULATED: 76 MG/DL
MCH RBC QN AUTO: 30.6 PG (ref 27–31)
MCHC RBC AUTO-ENTMCNC: 31.9 G/DL (ref 33–37)
MCV RBC AUTO: 95.8 FL (ref 80–94)
PDW BLD-RTO: 12.4 % (ref 11.5–14.5)
PLATELET # BLD: 226 K/UL (ref 130–400)
PMV BLD AUTO: 10.2 FL (ref 9.4–12.4)
POTASSIUM SERPL-SCNC: 4.7 MMOL/L (ref 3.5–5)
PROSTATE SPECIFIC ANTIGEN: 0.87 NG/ML (ref 0–4)
RBC # BLD: 4.32 M/UL (ref 4.7–6.1)
SODIUM BLD-SCNC: 141 MMOL/L (ref 136–145)
TOTAL PROTEIN: 6.5 G/DL (ref 6.6–8.7)
TRIGL SERPL-MCNC: 192 MG/DL (ref 0–149)
WBC # BLD: 6.4 K/UL (ref 4.8–10.8)

## 2018-09-10 ENCOUNTER — OFFICE VISIT (OUTPATIENT)
Dept: INTERNAL MEDICINE | Age: 68
End: 2018-09-10
Payer: MEDICARE

## 2018-09-10 VITALS
HEIGHT: 72 IN | WEIGHT: 224 LBS | BODY MASS INDEX: 30.34 KG/M2 | SYSTOLIC BLOOD PRESSURE: 134 MMHG | HEART RATE: 61 BPM | OXYGEN SATURATION: 97 % | DIASTOLIC BLOOD PRESSURE: 80 MMHG

## 2018-09-10 DIAGNOSIS — E78.2 MIXED HYPERLIPIDEMIA: Chronic | ICD-10-CM

## 2018-09-10 DIAGNOSIS — I50.23 ACUTE ON CHRONIC SYSTOLIC CONGESTIVE HEART FAILURE (HCC): ICD-10-CM

## 2018-09-10 DIAGNOSIS — Z00.00 ROUTINE GENERAL MEDICAL EXAMINATION AT A HEALTH CARE FACILITY: ICD-10-CM

## 2018-09-10 DIAGNOSIS — E11.8 TYPE 2 DIABETES MELLITUS WITH COMPLICATION, WITHOUT LONG-TERM CURRENT USE OF INSULIN (HCC): Primary | Chronic | ICD-10-CM

## 2018-09-10 DIAGNOSIS — I10 ESSENTIAL HYPERTENSION: Chronic | ICD-10-CM

## 2018-09-10 DIAGNOSIS — J44.9 CHRONIC OBSTRUCTIVE PULMONARY DISEASE, UNSPECIFIED COPD TYPE (HCC): ICD-10-CM

## 2018-09-10 PROCEDURE — G0438 PPPS, INITIAL VISIT: HCPCS | Performed by: INTERNAL MEDICINE

## 2018-09-10 PROCEDURE — 3044F HG A1C LEVEL LT 7.0%: CPT | Performed by: INTERNAL MEDICINE

## 2018-09-10 PROCEDURE — 4040F PNEUMOC VAC/ADMIN/RCVD: CPT | Performed by: INTERNAL MEDICINE

## 2018-09-10 PROCEDURE — G8598 ASA/ANTIPLAT THER USED: HCPCS | Performed by: INTERNAL MEDICINE

## 2018-09-10 ASSESSMENT — LIFESTYLE VARIABLES: HOW OFTEN DO YOU HAVE A DRINK CONTAINING ALCOHOL: 0

## 2018-09-10 ASSESSMENT — ANXIETY QUESTIONNAIRES: GAD7 TOTAL SCORE: 0

## 2018-09-10 ASSESSMENT — PATIENT HEALTH QUESTIONNAIRE - PHQ9
SUM OF ALL RESPONSES TO PHQ QUESTIONS 1-9: 0
SUM OF ALL RESPONSES TO PHQ QUESTIONS 1-9: 0

## 2018-09-10 NOTE — PROGRESS NOTES
values have been reviewed and are found in Flowsheets. The following problems were reviewed today and where indicated follow up appointments were made and/or referrals ordered. Positive Risk Factor Screenings with Interventions:     Substance Abuse:  Social History     Tobacco History     Smoking Status  Current Every Day Smoker Smoking Frequency  1 pack/day for 15 years (15 pk yrs) Smoking Tobacco Type  Cigarettes    Smokeless Tobacco Use  Former User Smokeless Tobacco Type  Chew          Alcohol History     Alcohol Use Status  No          Drug Use     Drug Use Status  No          Sexual Activity     Sexually Active  Not Asked               Audit Questionnaire: Screen for Alcohol Misuse  How often do you have a drink containing alcohol?: Never  Substance Abuse Interventions:  · None indicated    General Health:  General  In general, how would you say your health is?: Good  In the past 7 days, have you experienced any of the following?: None of These  Do you get the social and emotional support that you need?: Yes  Do you have a Living Will?: (!) No  General Health Risk Interventions:  · None indicated    Health Habits/Nutrition:  Health Habits/Nutrition  Do you exercise for at least 20 minutes 2-3 times per week?: Yes  Have you lost any weight without trying in the past 3 months?: No  Do you eat fewer than 2 meals per day?: No  Have you seen a dentist within the past year?: (!) No  Body mass index is 30.38 kg/m².   Health Habits/Nutrition Interventions:  · None indicated    Hearing/Vision:  Hearing/Vision  Do you or your family notice any trouble with your hearing?: No  Do you have difficulty driving, watching TV, or doing any of your daily activities because of your eyesight?: No  Have you had an eye exam within the past year?: (!) No  Hearing/Vision Interventions:  · None indicated    Personalized Preventive Plan   Current Health Maintenance Status  Immunization History   Administered Date(s) Administered   

## 2018-09-11 RX ORDER — SYRINGE AND NEEDLE,INSULIN,1ML 31GX15/64"
SYRINGE, EMPTY DISPOSABLE MISCELLANEOUS
Qty: 100 EACH | Refills: 3 | Status: SHIPPED | OUTPATIENT
Start: 2018-09-11 | End: 2019-04-03 | Stop reason: SDUPTHER

## 2018-10-03 RX ORDER — PIOGLITAZONEHYDROCHLORIDE 45 MG/1
TABLET ORAL
Qty: 90 TABLET | Refills: 1 | Status: SHIPPED | OUTPATIENT
Start: 2018-10-03 | End: 2019-04-03 | Stop reason: SDUPTHER

## 2018-10-13 ENCOUNTER — OFFICE VISIT (OUTPATIENT)
Dept: RETAIL CLINIC | Facility: CLINIC | Age: 68
End: 2018-10-13

## 2018-10-13 DIAGNOSIS — Z23 NEED FOR VACCINATION: Primary | ICD-10-CM

## 2018-10-28 DIAGNOSIS — E78.5 HYPERLIPIDEMIA: ICD-10-CM

## 2018-10-29 RX ORDER — SIMVASTATIN 40 MG
TABLET ORAL
Qty: 30 TABLET | Refills: 5 | Status: SHIPPED | OUTPATIENT
Start: 2018-10-29 | End: 2019-05-22 | Stop reason: SDUPTHER

## 2018-10-29 NOTE — TELEPHONE ENCOUNTER
Colene Dancer called requesting a refill of the below medication which has been pended for you:     Requested Prescriptions     Pending Prescriptions Disp Refills    simvastatin (ZOCOR) 40 MG tablet [Pharmacy Med Name: SIMVASTATIN 40MG    TAB] 30 tablet 5     Sig: TAKE 1 TABLET BY MOUTH ONCE DAILY       Last Appointment Date: 9/10/2018  Next Appointment Date: 12/13/2018    No Known Allergies

## 2018-11-07 ENCOUNTER — OFFICE VISIT (OUTPATIENT)
Dept: CARDIOLOGY | Age: 68
End: 2018-11-07

## 2018-11-07 VITALS
DIASTOLIC BLOOD PRESSURE: 72 MMHG | SYSTOLIC BLOOD PRESSURE: 118 MMHG | OXYGEN SATURATION: 95 % | WEIGHT: 224.2 LBS | HEIGHT: 72 IN | BODY MASS INDEX: 30.37 KG/M2 | HEART RATE: 75 BPM

## 2018-11-07 DIAGNOSIS — I10 ESSENTIAL HYPERTENSION: Primary | Chronic | ICD-10-CM

## 2018-11-07 PROCEDURE — G8598 ASA/ANTIPLAT THER USED: HCPCS | Performed by: INTERNAL MEDICINE

## 2018-11-07 PROCEDURE — G8484 FLU IMMUNIZE NO ADMIN: HCPCS | Performed by: INTERNAL MEDICINE

## 2018-11-07 PROCEDURE — G8417 CALC BMI ABV UP PARAM F/U: HCPCS | Performed by: INTERNAL MEDICINE

## 2018-11-07 PROCEDURE — 4040F PNEUMOC VAC/ADMIN/RCVD: CPT | Performed by: INTERNAL MEDICINE

## 2018-11-07 PROCEDURE — 1123F ACP DISCUSS/DSCN MKR DOCD: CPT | Performed by: INTERNAL MEDICINE

## 2018-11-07 PROCEDURE — 3017F COLORECTAL CA SCREEN DOC REV: CPT | Performed by: INTERNAL MEDICINE

## 2018-11-07 PROCEDURE — G8427 DOCREV CUR MEDS BY ELIG CLIN: HCPCS | Performed by: INTERNAL MEDICINE

## 2018-11-07 PROCEDURE — 1101F PT FALLS ASSESS-DOCD LE1/YR: CPT | Performed by: INTERNAL MEDICINE

## 2018-11-07 PROCEDURE — 4004F PT TOBACCO SCREEN RCVD TLK: CPT | Performed by: INTERNAL MEDICINE

## 2018-11-07 PROCEDURE — 93000 ELECTROCARDIOGRAM COMPLETE: CPT | Performed by: INTERNAL MEDICINE

## 2018-11-07 PROCEDURE — 99213 OFFICE O/P EST LOW 20 MIN: CPT | Performed by: INTERNAL MEDICINE

## 2018-11-07 ASSESSMENT — ENCOUNTER SYMPTOMS
WHEEZING: 0
APNEA: 0
COUGH: 0
CHEST TIGHTNESS: 0
SHORTNESS OF BREATH: 1

## 2018-12-10 DIAGNOSIS — E11.9 DIABETES MELLITUS WITHOUT COMPLICATION (HCC): ICD-10-CM

## 2018-12-10 DIAGNOSIS — E78.2 MIXED HYPERLIPIDEMIA: Primary | Chronic | ICD-10-CM

## 2018-12-10 LAB
ALBUMIN SERPL-MCNC: 4.2 G/DL (ref 3.5–5.2)
ALP BLD-CCNC: 57 U/L (ref 40–130)
ALT SERPL-CCNC: 18 U/L (ref 5–41)
AST SERPL-CCNC: 15 U/L (ref 5–40)
BILIRUB SERPL-MCNC: 0.3 MG/DL (ref 0.2–1.2)
BILIRUBIN DIRECT: 0.1 MG/DL (ref 0–0.3)
BILIRUBIN, INDIRECT: 0.2 MG/DL (ref 0.1–1)
CHOLESTEROL, TOTAL: 166 MG/DL (ref 160–199)
CREATININE URINE: 118.2 MG/DL (ref 4.2–622)
HBA1C MFR BLD: 6.6 % (ref 4–6)
HDLC SERPL-MCNC: 31 MG/DL (ref 55–121)
LDL CHOLESTEROL CALCULATED: 83 MG/DL
MICROALBUMIN UR-MCNC: 1.7 MG/DL (ref 0–19)
MICROALBUMIN/CREAT UR-RTO: 14.4 MG/G
TOTAL PROTEIN: 6.9 G/DL (ref 6.6–8.7)
TRIGL SERPL-MCNC: 260 MG/DL (ref 0–149)

## 2018-12-13 ENCOUNTER — OFFICE VISIT (OUTPATIENT)
Dept: INTERNAL MEDICINE | Age: 68
End: 2018-12-13
Payer: MEDICARE

## 2018-12-13 VITALS
OXYGEN SATURATION: 98 % | WEIGHT: 225 LBS | HEART RATE: 72 BPM | HEIGHT: 72 IN | SYSTOLIC BLOOD PRESSURE: 122 MMHG | BODY MASS INDEX: 30.48 KG/M2 | DIASTOLIC BLOOD PRESSURE: 64 MMHG

## 2018-12-13 DIAGNOSIS — E11.8 TYPE 2 DIABETES MELLITUS WITH COMPLICATION, WITHOUT LONG-TERM CURRENT USE OF INSULIN (HCC): Primary | Chronic | ICD-10-CM

## 2018-12-13 DIAGNOSIS — E78.2 MIXED HYPERLIPIDEMIA: Chronic | ICD-10-CM

## 2018-12-13 DIAGNOSIS — Z12.5 ENCOUNTER FOR SCREENING FOR MALIGNANT NEOPLASM OF PROSTATE: ICD-10-CM

## 2018-12-13 DIAGNOSIS — I10 ESSENTIAL HYPERTENSION: Chronic | ICD-10-CM

## 2018-12-13 PROCEDURE — 3017F COLORECTAL CA SCREEN DOC REV: CPT | Performed by: INTERNAL MEDICINE

## 2018-12-13 PROCEDURE — G8427 DOCREV CUR MEDS BY ELIG CLIN: HCPCS | Performed by: INTERNAL MEDICINE

## 2018-12-13 PROCEDURE — 99214 OFFICE O/P EST MOD 30 MIN: CPT | Performed by: INTERNAL MEDICINE

## 2018-12-13 PROCEDURE — G8598 ASA/ANTIPLAT THER USED: HCPCS | Performed by: INTERNAL MEDICINE

## 2018-12-13 PROCEDURE — G8417 CALC BMI ABV UP PARAM F/U: HCPCS | Performed by: INTERNAL MEDICINE

## 2018-12-13 PROCEDURE — 2022F DILAT RTA XM EVC RTNOPTHY: CPT | Performed by: INTERNAL MEDICINE

## 2018-12-13 PROCEDURE — 1123F ACP DISCUSS/DSCN MKR DOCD: CPT | Performed by: INTERNAL MEDICINE

## 2018-12-13 PROCEDURE — G8484 FLU IMMUNIZE NO ADMIN: HCPCS | Performed by: INTERNAL MEDICINE

## 2018-12-13 PROCEDURE — 3044F HG A1C LEVEL LT 7.0%: CPT | Performed by: INTERNAL MEDICINE

## 2018-12-13 PROCEDURE — 4004F PT TOBACCO SCREEN RCVD TLK: CPT | Performed by: INTERNAL MEDICINE

## 2018-12-13 PROCEDURE — 1101F PT FALLS ASSESS-DOCD LE1/YR: CPT | Performed by: INTERNAL MEDICINE

## 2018-12-13 PROCEDURE — 4040F PNEUMOC VAC/ADMIN/RCVD: CPT | Performed by: INTERNAL MEDICINE

## 2018-12-13 ASSESSMENT — ENCOUNTER SYMPTOMS
ABDOMINAL PAIN: 0
SINUS PRESSURE: 0
EYE ITCHING: 0
NAUSEA: 0
TROUBLE SWALLOWING: 0
ABDOMINAL DISTENTION: 0
WHEEZING: 0
SHORTNESS OF BREATH: 0
SORE THROAT: 0
BLOOD IN STOOL: 0
VOMITING: 0
EYE DISCHARGE: 0
BACK PAIN: 0

## 2019-01-30 DIAGNOSIS — I25.119 CORONARY ARTERY DISEASE INVOLVING NATIVE HEART WITH ANGINA PECTORIS, UNSPECIFIED VESSEL OR LESION TYPE (HCC): ICD-10-CM

## 2019-01-30 DIAGNOSIS — I10 ESSENTIAL HYPERTENSION: ICD-10-CM

## 2019-01-30 RX ORDER — RAMIPRIL 5 MG/1
CAPSULE ORAL
Qty: 90 CAPSULE | Refills: 3 | Status: SHIPPED | OUTPATIENT
Start: 2019-01-30 | End: 2019-12-04

## 2019-02-01 ENCOUNTER — TELEPHONE (OUTPATIENT)
Dept: UROLOGY | Facility: CLINIC | Age: 69
End: 2019-02-01

## 2019-02-01 NOTE — TELEPHONE ENCOUNTER
Pt came into the office and said the insurnace company will no longer pay for the oxybutnin. He is needing a replacement.

## 2019-02-05 ENCOUNTER — TELEPHONE (OUTPATIENT)
Dept: UROLOGY | Facility: CLINIC | Age: 69
End: 2019-02-05

## 2019-02-11 NOTE — TELEPHONE ENCOUNTER
Spoke with pt he is going to have his daughter check with insurance company to see what medication is covered under his plan.

## 2019-02-12 ENCOUNTER — TELEPHONE (OUTPATIENT)
Dept: UROLOGY | Facility: CLINIC | Age: 69
End: 2019-02-12

## 2019-02-19 RX ORDER — BLOOD SUGAR DIAGNOSTIC
STRIP MISCELLANEOUS
Qty: 100 STRIP | Refills: 3 | Status: SHIPPED | OUTPATIENT
Start: 2019-02-19 | End: 2019-02-26 | Stop reason: SDUPTHER

## 2019-02-22 ENCOUNTER — TELEPHONE (OUTPATIENT)
Dept: UROLOGY | Facility: CLINIC | Age: 69
End: 2019-02-22

## 2019-02-22 DIAGNOSIS — N32.81 OAB (OVERACTIVE BLADDER): Primary | ICD-10-CM

## 2019-02-22 RX ORDER — TOLTERODINE 4 MG/1
4 CAPSULE, EXTENDED RELEASE ORAL DAILY
Qty: 30 CAPSULE | Refills: 1 | Status: SHIPPED | OUTPATIENT
Start: 2019-02-22 | End: 2019-02-27 | Stop reason: ALTCHOICE

## 2019-02-26 ENCOUNTER — TELEPHONE (OUTPATIENT)
Dept: UROLOGY | Facility: CLINIC | Age: 69
End: 2019-02-26

## 2019-02-26 DIAGNOSIS — E11.9 TYPE 2 DIABETES MELLITUS WITHOUT COMPLICATION, WITH LONG-TERM CURRENT USE OF INSULIN (HCC): Primary | ICD-10-CM

## 2019-02-26 DIAGNOSIS — Z79.4 TYPE 2 DIABETES MELLITUS WITHOUT COMPLICATION, WITH LONG-TERM CURRENT USE OF INSULIN (HCC): Primary | ICD-10-CM

## 2019-02-27 DIAGNOSIS — N32.81 OAB (OVERACTIVE BLADDER): Primary | ICD-10-CM

## 2019-02-27 RX ORDER — OXYBUTYNIN CHLORIDE 15 MG/1
15 TABLET, EXTENDED RELEASE ORAL
COMMUNITY
End: 2019-02-27 | Stop reason: SDUPTHER

## 2019-02-27 RX ORDER — OXYBUTYNIN CHLORIDE 15 MG/1
15 TABLET, EXTENDED RELEASE ORAL DAILY
Qty: 30 TABLET | Refills: 5 | Status: SHIPPED | OUTPATIENT
Start: 2019-02-27 | End: 2019-08-21 | Stop reason: SDUPTHER

## 2019-02-27 NOTE — TELEPHONE ENCOUNTER
On 2/01/2019 Pt came into office and said that Oxybutynin was not covered under his insurance. I contacted pt to clarify if this medication will be covered.  Pt assures me that he spoke with his insurance company and it is covered under his plan.  I informed pt that if his insurance does not cover this med I  don't know what else I can do. I have sent other meds to his pharmacy that were to be covered by his insurance, pt says that they are expensive. We have no control over medication cost.

## 2019-03-13 DIAGNOSIS — I10 ESSENTIAL HYPERTENSION: ICD-10-CM

## 2019-03-13 DIAGNOSIS — I25.10 CORONARY ARTERY DISEASE INVOLVING NATIVE HEART WITHOUT ANGINA PECTORIS, UNSPECIFIED VESSEL OR LESION TYPE: ICD-10-CM

## 2019-03-13 RX ORDER — METOPROLOL TARTRATE 50 MG/1
TABLET, FILM COATED ORAL
Qty: 180 TABLET | Refills: 3 | Status: SHIPPED | OUTPATIENT
Start: 2019-03-13 | End: 2019-12-04

## 2019-04-03 RX ORDER — SYRINGE AND NEEDLE,INSULIN,1ML 31GX15/64"
SYRINGE, EMPTY DISPOSABLE MISCELLANEOUS
Qty: 200 EACH | Refills: 3 | Status: SHIPPED | OUTPATIENT
Start: 2019-04-03 | End: 2020-05-18

## 2019-04-03 RX ORDER — PIOGLITAZONEHYDROCHLORIDE 45 MG/1
TABLET ORAL
Qty: 90 TABLET | Refills: 1 | Status: SHIPPED | OUTPATIENT
Start: 2019-04-03 | End: 2019-10-03 | Stop reason: SDUPTHER

## 2019-04-09 DIAGNOSIS — Z12.5 ENCOUNTER FOR SCREENING FOR MALIGNANT NEOPLASM OF PROSTATE: ICD-10-CM

## 2019-04-09 DIAGNOSIS — E78.2 MIXED HYPERLIPIDEMIA: Chronic | ICD-10-CM

## 2019-04-09 DIAGNOSIS — E11.8 TYPE 2 DIABETES MELLITUS WITH COMPLICATION, WITHOUT LONG-TERM CURRENT USE OF INSULIN (HCC): Chronic | ICD-10-CM

## 2019-04-09 DIAGNOSIS — I10 ESSENTIAL HYPERTENSION: Chronic | ICD-10-CM

## 2019-04-09 LAB
ALBUMIN SERPL-MCNC: 4.2 G/DL (ref 3.5–5.2)
ALP BLD-CCNC: 63 U/L (ref 40–130)
ALT SERPL-CCNC: 21 U/L (ref 5–41)
ANION GAP SERPL CALCULATED.3IONS-SCNC: 16 MMOL/L (ref 7–19)
AST SERPL-CCNC: 19 U/L (ref 5–40)
BASOPHILS ABSOLUTE: 0.1 K/UL (ref 0–0.2)
BASOPHILS RELATIVE PERCENT: 0.8 % (ref 0–1)
BILIRUB SERPL-MCNC: 0.3 MG/DL (ref 0.2–1.2)
BUN BLDV-MCNC: 26 MG/DL (ref 8–23)
CALCIUM SERPL-MCNC: 9.5 MG/DL (ref 8.8–10.2)
CHLORIDE BLD-SCNC: 98 MMOL/L (ref 98–111)
CHOLESTEROL, TOTAL: 138 MG/DL (ref 160–199)
CO2: 26 MMOL/L (ref 22–29)
CREAT SERPL-MCNC: 1.2 MG/DL (ref 0.5–1.2)
EOSINOPHILS ABSOLUTE: 0.1 K/UL (ref 0–0.6)
EOSINOPHILS RELATIVE PERCENT: 1.4 % (ref 0–5)
GFR NON-AFRICAN AMERICAN: 60
GLUCOSE BLD-MCNC: 108 MG/DL (ref 74–109)
HBA1C MFR BLD: 6.4 % (ref 4–6)
HCT VFR BLD CALC: 47 % (ref 42–52)
HDLC SERPL-MCNC: 26 MG/DL (ref 55–121)
HEMOGLOBIN: 15 G/DL (ref 14–18)
LDL CHOLESTEROL CALCULATED: 79 MG/DL
LYMPHOCYTES ABSOLUTE: 1.9 K/UL (ref 1.1–4.5)
LYMPHOCYTES RELATIVE PERCENT: 30.4 % (ref 20–40)
MCH RBC QN AUTO: 30.8 PG (ref 27–31)
MCHC RBC AUTO-ENTMCNC: 31.9 G/DL (ref 33–37)
MCV RBC AUTO: 96.5 FL (ref 80–94)
MONOCYTES ABSOLUTE: 0.7 K/UL (ref 0–0.9)
MONOCYTES RELATIVE PERCENT: 10.5 % (ref 0–10)
NEUTROPHILS ABSOLUTE: 3.5 K/UL (ref 1.5–7.5)
NEUTROPHILS RELATIVE PERCENT: 56.6 % (ref 50–65)
PDW BLD-RTO: 12.6 % (ref 11.5–14.5)
PLATELET # BLD: 262 K/UL (ref 130–400)
PMV BLD AUTO: 10.4 FL (ref 9.4–12.4)
POTASSIUM SERPL-SCNC: 5.1 MMOL/L (ref 3.5–5)
PROSTATE SPECIFIC ANTIGEN: 1.02 NG/ML (ref 0–4)
RBC # BLD: 4.87 M/UL (ref 4.7–6.1)
SODIUM BLD-SCNC: 140 MMOL/L (ref 136–145)
TOTAL PROTEIN: 6.9 G/DL (ref 6.6–8.7)
TRIGL SERPL-MCNC: 167 MG/DL (ref 0–149)
WBC # BLD: 6.2 K/UL (ref 4.8–10.8)

## 2019-04-15 ENCOUNTER — OFFICE VISIT (OUTPATIENT)
Dept: INTERNAL MEDICINE | Age: 69
End: 2019-04-15
Payer: MEDICARE

## 2019-04-15 VITALS
HEART RATE: 60 BPM | WEIGHT: 225 LBS | SYSTOLIC BLOOD PRESSURE: 104 MMHG | BODY MASS INDEX: 30.48 KG/M2 | OXYGEN SATURATION: 97 % | DIASTOLIC BLOOD PRESSURE: 60 MMHG | HEIGHT: 72 IN

## 2019-04-15 DIAGNOSIS — E11.8 TYPE 2 DIABETES MELLITUS WITH COMPLICATION, WITHOUT LONG-TERM CURRENT USE OF INSULIN (HCC): Primary | Chronic | ICD-10-CM

## 2019-04-15 DIAGNOSIS — I25.10 CORONARY ARTERY DISEASE INVOLVING NATIVE CORONARY ARTERY OF NATIVE HEART WITHOUT ANGINA PECTORIS: Chronic | ICD-10-CM

## 2019-04-15 DIAGNOSIS — I10 ESSENTIAL HYPERTENSION: Chronic | ICD-10-CM

## 2019-04-15 DIAGNOSIS — E78.2 MIXED HYPERLIPIDEMIA: Chronic | ICD-10-CM

## 2019-04-15 PROCEDURE — 4004F PT TOBACCO SCREEN RCVD TLK: CPT | Performed by: INTERNAL MEDICINE

## 2019-04-15 PROCEDURE — G8598 ASA/ANTIPLAT THER USED: HCPCS | Performed by: INTERNAL MEDICINE

## 2019-04-15 PROCEDURE — G8427 DOCREV CUR MEDS BY ELIG CLIN: HCPCS | Performed by: INTERNAL MEDICINE

## 2019-04-15 PROCEDURE — 1123F ACP DISCUSS/DSCN MKR DOCD: CPT | Performed by: INTERNAL MEDICINE

## 2019-04-15 PROCEDURE — G8417 CALC BMI ABV UP PARAM F/U: HCPCS | Performed by: INTERNAL MEDICINE

## 2019-04-15 PROCEDURE — 3044F HG A1C LEVEL LT 7.0%: CPT | Performed by: INTERNAL MEDICINE

## 2019-04-15 PROCEDURE — 2022F DILAT RTA XM EVC RTNOPTHY: CPT | Performed by: INTERNAL MEDICINE

## 2019-04-15 PROCEDURE — 3017F COLORECTAL CA SCREEN DOC REV: CPT | Performed by: INTERNAL MEDICINE

## 2019-04-15 PROCEDURE — 99214 OFFICE O/P EST MOD 30 MIN: CPT | Performed by: INTERNAL MEDICINE

## 2019-04-15 PROCEDURE — 4040F PNEUMOC VAC/ADMIN/RCVD: CPT | Performed by: INTERNAL MEDICINE

## 2019-04-15 ASSESSMENT — ENCOUNTER SYMPTOMS
VOMITING: 0
ABDOMINAL PAIN: 0
NAUSEA: 0
EYE ITCHING: 0
TROUBLE SWALLOWING: 0
SORE THROAT: 0
BLOOD IN STOOL: 0
SINUS PRESSURE: 0
EYE DISCHARGE: 0
SHORTNESS OF BREATH: 0
WHEEZING: 0
BACK PAIN: 0
ABDOMINAL DISTENTION: 0

## 2019-04-15 ASSESSMENT — PATIENT HEALTH QUESTIONNAIRE - PHQ9
SUM OF ALL RESPONSES TO PHQ QUESTIONS 1-9: 0
2. FEELING DOWN, DEPRESSED OR HOPELESS: 0
SUM OF ALL RESPONSES TO PHQ9 QUESTIONS 1 & 2: 0
SUM OF ALL RESPONSES TO PHQ QUESTIONS 1-9: 0
1. LITTLE INTEREST OR PLEASURE IN DOING THINGS: 0
1. LITTLE INTEREST OR PLEASURE IN DOING THINGS: 0

## 2019-04-15 NOTE — PROGRESS NOTES
TWICE DAILY AS NEEDED 100 strip 3    ramipril (ALTACE) 5 MG capsule TAKE ONE CAPSULE BY MOUTH ONCE DAILY 90 capsule 3    simvastatin (ZOCOR) 40 MG tablet TAKE 1 TABLET BY MOUTH ONCE DAILY 30 tablet 5    metFORMIN (GLUCOPHAGE) 1000 MG tablet TAKE 1 TABLET BY MOUTH TWICE DAILY 180 tablet 1    furosemide (LASIX) 40 MG tablet TAKE 1 TABLET BY MOUTH ONCE DAILY 90 tablet 5    nitroGLYCERIN (NITROSTAT) 0.4 MG SL tablet Place 1 tablet under the tongue every 5 minutes as needed for Chest pain up to max of 3 total doses. If no relief after 1 dose, call 911. 25 tablet 3    spironolactone (ALDACTONE) 25 MG tablet TAKE ONE TABLET BY MOUTH ONCE DAILY 90 tablet 3    insulin NPH (HUMULIN N) 100 UNIT/ML injection vial 20 UNITS IN AM AND 10 UNITS IN PM 1 vial 3    Lancets MISC PT IS INSULIN DEPT AND CAN CHECK TWICE DAILY WHEN NEEDED   DX E11.8 100 each 3    Insulin Pen Needle 32G X 4 MM MISC 1 each by Does not apply route daily 100 each 3    Multiple Vitamins-Minerals (THERAPEUTIC MULTIVITAMIN-MINERALS) tablet Take 1 tablet by mouth daily Alive mens 50+      oxybutynin (DITROPAN XL) 15 MG extended release tablet Take 15 mg by mouth daily      aspirin 325 MG tablet Take 325 mg by mouth daily.  vitamin E 400 UNIT capsule Take 400 Units by mouth daily. No current facility-administered medications for this visit. No Known Allergies      Subjective:     Review of Systems   Constitutional: Negative for activity change, appetite change, fatigue and fever. HENT: Negative for congestion, hearing loss, sinus pressure, sore throat and trouble swallowing. Eyes: Negative for discharge and itching. Respiratory: Negative for shortness of breath and wheezing. Cardiovascular: Negative for chest pain, palpitations and leg swelling. Gastrointestinal: Negative for abdominal distention, abdominal pain, blood in stool, nausea and vomiting.    Endocrine: Negative for cold intolerance, heat intolerance and He has normal reflexes. He displays normal reflexes. No cranial nerve deficit. He exhibits normal muscle tone. Coordination normal.   Skin: Skin is warm and dry. No rash noted. He is not diaphoretic. No erythema. No pallor. Psychiatric: He has a normal mood and affect. His behavior is normal. Judgment and thought content normal.        Assessment:      Diagnosis Orders   1. Type 2 diabetes mellitus with complication, without long-term current use of insulin ([facility] [address]     2. Mixed hyperlipidemia     3. Essential hypertension     4. Coronary artery disease involving native coronary artery of native heart without angina pectoris              Plan:     Type II diabetes mellitus which is at goal. His medicines have remained unchanged he stays active he continues with his nsulin and his Actos. Globin A1c is gone from 6.6-6.4 and he's under excellent control and continue the same. I per lipidemia. He remains on his simvastatin 40 mg daily. His LDLs less than 100. Total cholesterols less than 200. Triglycerides less than 150. Her enzymes are normal and were going continue the same dose. Essential hypertension he's at goal with his current dose of medications. He watches his salt he tries remain exercise friendly and we will continue the same medications. Coronary artery disease. He continues to remain pain-free with his activity with no evidence of any angina and his nitroglycerin on board but he hasn't use it in quite some time. He will continue to keep it and use it only as needed. He is stable. Is spent 25 minutes today 50% of the time counseling him on diet exercise medication compliance cardiovascular risk factors also reducing his fall risk precautions and immunizations and I'll see him back in 6 months. No follow-ups on file. Patient given educational materials- see patient instructions. Discussed use, benefit, and side effects of prescribedmedications. All patient questions answered.   Pt voiced understanding. Reviewedhealth maintenance. Instructed to continue current medications, diet and exercise. Patient agreed with treatment plan. **This report has been created usingvoice recognition software. It may contain minor errors which are inherent in voicerecognition technology. **    Electronically signed by Deepika Khoury MD on 4/15/2019 at 10:50 AM

## 2019-04-19 LAB — PSA SERPL-MCNC: 1.97 NG/ML (ref 0–4)

## 2019-04-25 ENCOUNTER — OFFICE VISIT (OUTPATIENT)
Dept: UROLOGY | Facility: CLINIC | Age: 69
End: 2019-04-25

## 2019-04-25 VITALS — BODY MASS INDEX: 30.88 KG/M2 | HEIGHT: 72 IN | WEIGHT: 228 LBS | TEMPERATURE: 97.6 F

## 2019-04-25 DIAGNOSIS — N32.81 OAB (OVERACTIVE BLADDER): ICD-10-CM

## 2019-04-25 DIAGNOSIS — N13.8 BPH WITH URINARY OBSTRUCTION: Primary | ICD-10-CM

## 2019-04-25 DIAGNOSIS — R97.20 ELEVATED PROSTATE SPECIFIC ANTIGEN (PSA): ICD-10-CM

## 2019-04-25 DIAGNOSIS — N40.1 BPH WITH URINARY OBSTRUCTION: Primary | ICD-10-CM

## 2019-04-25 LAB
BILIRUB BLD-MCNC: NEGATIVE MG/DL
CLARITY, POC: CLEAR
COLOR UR: YELLOW
GLUCOSE UR STRIP-MCNC: NEGATIVE MG/DL
KETONES UR QL: NEGATIVE
LEUKOCYTE EST, POC: NEGATIVE
NITRITE UR-MCNC: NEGATIVE MG/ML
PH UR: 6 [PH] (ref 5–8)
PROT UR STRIP-MCNC: NEGATIVE MG/DL
RBC # UR STRIP: NEGATIVE /UL
SP GR UR: 1.01 (ref 1–1.03)
UROBILINOGEN UR QL: NORMAL

## 2019-04-25 PROCEDURE — 99213 OFFICE O/P EST LOW 20 MIN: CPT | Performed by: UROLOGY

## 2019-04-25 PROCEDURE — 81003 URINALYSIS AUTO W/O SCOPE: CPT | Performed by: UROLOGY

## 2019-04-25 NOTE — PATIENT INSTRUCTIONS

## 2019-04-25 NOTE — PROGRESS NOTES
Subjective    Mr. Montiel is 68 y.o. male    CHIEF COMPLAINT:   BPH    The patient comes in today for follow-up of BPH clinically he is doing okay his AUA score has gone up but in discussing the things he really does not feels any worse than it was a year ago he has not had any gross hematuria is had no urinary tract infections.    He does have a previous history of elevated PSA but his PSA today is perfectly normal for age at 1.9.    He also has an overactive ureteral bladder he does take Ditropan XL his only complaint is that the cost is gone up I did tell him that we do have multiple medications similar to this that we can give him but apparently his daughter is gone online and nothing else seems to come up but I said that they find something certainly let us know that this is an old drug and there should be plenty of substitute        History of Present Illness      The following portions of the patient's history were reviewed and updated as appropriate: allergies, current medications, past family history, past medical history, past social history, past surgical history and problem list.    Review of Systems   Constitutional: Negative.  Negative for chills and fever.   Gastrointestinal: Negative for abdominal distention, abdominal pain, anal bleeding, blood in stool and nausea.   Genitourinary: Positive for frequency and urgency. Negative for difficulty urinating, dysuria, flank pain and hematuria.   Psychiatric/Behavioral: Negative.  Negative for agitation and confusion.         Current Outpatient Medications:   •  aspirin 325 MG tablet, Take  by mouth., Disp: , Rfl:   •  furosemide (LASIX) 40 MG tablet, Take  by mouth., Disp: , Rfl:   •  glipiZIDE (GLUCOTROL) 10 MG 24 hr tablet, Take  by mouth., Disp: , Rfl:   •  glucose blood test strip, As needed.  PT IS INSULIN DEPT AND CAN CK TWICE DAILY IF NEEDED   DX E11.9, Disp: , Rfl:   •  Insulin Glargine (LANTUS SOLOSTAR) 100 UNIT/ML injection pen, Inject  under the  skin., Disp: , Rfl:   •  insulin NPH (humuLIN N,novoLIN N) 100 UNIT/ML injection, 20 UNITS IN AM AND 10 UNITS IN PM, Disp: , Rfl:   •  Insulin Pen Needle 32G X 4 MM misc, , Disp: , Rfl:   •  Insulin Syringes, Disposable, U-100 1 ML misc, , Disp: , Rfl:   •  metFORMIN (GLUCOPHAGE) 1000 MG tablet, Take  by mouth., Disp: , Rfl:   •  metoprolol tartrate (LOPRESSOR) 50 MG tablet, Take  by mouth., Disp: , Rfl:   •  nitroglycerin (NITROSTAT) 0.4 MG SL tablet, Place  under the tongue Every 5 (Five) Minutes., Disp: , Rfl:   •  oxybutynin XL (DITROPAN XL) 15 MG 24 hr tablet, Take 1 tablet by mouth Daily., Disp: 30 tablet, Rfl: 5  •  pioglitazone (ACTOS) 45 MG tablet, Take  by mouth., Disp: , Rfl:   •  ramipril (ALTACE) 5 MG capsule, Take  by mouth., Disp: , Rfl:   •  simvastatin (ZOCOR) 40 MG tablet, Take  by mouth., Disp: , Rfl:   •  spironolactone (ALDACTONE) 25 MG tablet, Take  by mouth., Disp: , Rfl:   •  therapeutic multivitamin-minerals (THERAGRAN-M) tablet, Take  by mouth., Disp: , Rfl:   •  vitamin E 400 UNIT capsule, Take  by mouth., Disp: , Rfl:     Past Medical History:   Diagnosis Date   • Cancer (CMS/HCC)     spot on nose   • Diabetes mellitus (CMS/HCC)    • Heart problem    • Hyperlipidemia    • Hypertension        Past Surgical History:   Procedure Laterality Date   • APPENDECTOMY     • CORONARY ARTERY BYPASS GRAFT     • NOSE SURGERY      cancer removed   • PENIS SURGERY      staph infection       Social History     Socioeconomic History   • Marital status: Unknown     Spouse name: Not on file   • Number of children: Not on file   • Years of education: Not on file   • Highest education level: Not on file   Tobacco Use   • Smoking status: Current Every Day Smoker     Packs/day: 1.00     Types: Cigarettes   • Smokeless tobacco: Never Used   Substance and Sexual Activity   • Alcohol use: No       Family History   Problem Relation Age of Onset   • No Known Problems Father    • No Known Problems Mother   "      Objective    Temp 97.6 °F (36.4 °C)   Ht 182.9 cm (72\")   Wt 103 kg (228 lb)   BMI 30.92 kg/m²     Physical Exam      Results Encounter on 04/29/2018   Component Date Value Ref Range Status   • PSA 04/18/2019 1.970  0.000 - 4.000 ng/mL Final       Results for orders placed or performed in visit on 04/25/19   POC Urinalysis Dipstick, Multipro   Result Value Ref Range    Color Yellow Yellow, Straw, Dark Yellow, Selina    Clarity, UA Clear Clear    Glucose, UA Negative Negative, 1000 mg/dL (3+) mg/dL    Bilirubin Negative Negative    Ketones, UA Negative Negative    Specific Gravity  1.015 1.005 - 1.030    Blood, UA Negative Negative    pH, Urine 6.0 5.0 - 8.0    Protein, POC Negative Negative mg/dL    Urobilinogen, UA Normal Normal    Nitrite, UA Negative Negative    Leukocytes Negative Negative     Patient's Body mass index is 30.92 kg/m². BMI is above normal parameters. Recommendations include: educational material.  Assessment and Plan    Diagnoses and all orders for this visit:    BPH with urinary obstruction  -     POC Urinalysis Dipstick, Multipro  -     PSA DIAGNOSTIC; Future    OAB (overactive bladder)  -     POC Urinalysis Dipstick, Multipro    Elevated prostate specific antigen (PSA)    Plan--his BPH symptoms are little worse but he does not want anything different this point time for discussing back again in 1 year.    Again he has a previous history of elevated PSA is now normal repeat again in a year.    His OAB symptoms are stable we will see him in in a year if he finds a cheaper alternative to the Ditropan will be more than happy to supply that for him  "

## 2019-05-05 RX ORDER — SPIRONOLACTONE 25 MG/1
TABLET ORAL
Qty: 90 TABLET | Refills: 3 | Status: SHIPPED | OUTPATIENT
Start: 2019-05-05 | End: 2019-12-04

## 2019-05-22 DIAGNOSIS — E78.5 HYPERLIPIDEMIA: ICD-10-CM

## 2019-05-23 RX ORDER — SIMVASTATIN 40 MG
TABLET ORAL
Qty: 90 TABLET | Refills: 1 | Status: SHIPPED | OUTPATIENT
Start: 2019-05-23 | End: 2019-11-13 | Stop reason: SDUPTHER

## 2019-07-15 DIAGNOSIS — I25.10 CORONARY ARTERY DISEASE INVOLVING NATIVE HEART WITHOUT ANGINA PECTORIS, UNSPECIFIED VESSEL OR LESION TYPE: ICD-10-CM

## 2019-07-15 RX ORDER — NITROGLYCERIN 0.4 MG/1
0.4 TABLET SUBLINGUAL EVERY 5 MIN PRN
Qty: 25 TABLET | Refills: 3 | Status: SHIPPED | OUTPATIENT
Start: 2019-07-15 | End: 2020-08-24 | Stop reason: SDUPTHER

## 2019-07-18 ENCOUNTER — TELEPHONE (OUTPATIENT)
Dept: CARDIOLOGY | Age: 69
End: 2019-07-18

## 2019-08-21 DIAGNOSIS — N32.81 OAB (OVERACTIVE BLADDER): ICD-10-CM

## 2019-08-21 RX ORDER — OXYBUTYNIN CHLORIDE 15 MG/1
TABLET, EXTENDED RELEASE ORAL
Qty: 90 TABLET | Refills: 3 | Status: SHIPPED | OUTPATIENT
Start: 2019-08-21 | End: 2021-01-01

## 2019-08-28 RX ORDER — FUROSEMIDE 40 MG/1
TABLET ORAL
Qty: 90 TABLET | Refills: 5 | Status: SHIPPED | OUTPATIENT
Start: 2019-08-28 | End: 2019-12-04

## 2019-09-04 ENCOUNTER — OFFICE VISIT (OUTPATIENT)
Dept: CARDIOLOGY | Age: 69
End: 2019-09-04
Payer: MEDICARE

## 2019-09-04 VITALS
DIASTOLIC BLOOD PRESSURE: 70 MMHG | HEIGHT: 72 IN | BODY MASS INDEX: 29.53 KG/M2 | HEART RATE: 64 BPM | SYSTOLIC BLOOD PRESSURE: 132 MMHG | WEIGHT: 218 LBS

## 2019-09-04 DIAGNOSIS — E78.5 HYPERLIPIDEMIA, UNSPECIFIED HYPERLIPIDEMIA TYPE: ICD-10-CM

## 2019-09-04 DIAGNOSIS — I25.10 CORONARY ARTERY DISEASE INVOLVING NATIVE CORONARY ARTERY OF NATIVE HEART WITHOUT ANGINA PECTORIS: Primary | ICD-10-CM

## 2019-09-04 DIAGNOSIS — F17.200 SMOKING: ICD-10-CM

## 2019-09-04 DIAGNOSIS — R06.09 DOE (DYSPNEA ON EXERTION): ICD-10-CM

## 2019-09-04 DIAGNOSIS — I10 ESSENTIAL HYPERTENSION: ICD-10-CM

## 2019-09-04 PROCEDURE — 4004F PT TOBACCO SCREEN RCVD TLK: CPT | Performed by: NURSE PRACTITIONER

## 2019-09-04 PROCEDURE — G8427 DOCREV CUR MEDS BY ELIG CLIN: HCPCS | Performed by: NURSE PRACTITIONER

## 2019-09-04 PROCEDURE — G8598 ASA/ANTIPLAT THER USED: HCPCS | Performed by: NURSE PRACTITIONER

## 2019-09-04 PROCEDURE — 1123F ACP DISCUSS/DSCN MKR DOCD: CPT | Performed by: NURSE PRACTITIONER

## 2019-09-04 PROCEDURE — 3017F COLORECTAL CA SCREEN DOC REV: CPT | Performed by: NURSE PRACTITIONER

## 2019-09-04 PROCEDURE — 99214 OFFICE O/P EST MOD 30 MIN: CPT | Performed by: NURSE PRACTITIONER

## 2019-09-04 PROCEDURE — G8417 CALC BMI ABV UP PARAM F/U: HCPCS | Performed by: NURSE PRACTITIONER

## 2019-09-04 PROCEDURE — 4040F PNEUMOC VAC/ADMIN/RCVD: CPT | Performed by: NURSE PRACTITIONER

## 2019-09-04 PROCEDURE — 99406 BEHAV CHNG SMOKING 3-10 MIN: CPT | Performed by: NURSE PRACTITIONER

## 2019-09-04 NOTE — PROGRESS NOTES
Dear Dr. Adriana Huerta MD,    Thank you for allowing me to participate in the care of Mr. Hetal Koch. He presents today at the [address] [place] [address] in the [facility]. As you know, Mr. Julisa Ramirez is a 68 y.o. male with history of hypertension, hyperlipidemia, diabetes, smoking, and CAD (CABG, 2001) who presents with the chief complaint of follow-up for chronic cardiac conditions. He is a patient of Dr. Cassie Pimentel. Since last seen, he states that he has done well from a cardiac standpoint. He is fairly sedentary at home but states that he gets out of work in his garden when it is not too hot. He continues to smoke a pack per day. He complains of no chest pain. He does have dyspnea on exertion that is been going on for years and has not worsened. He denies any fast or slow heart rhythms. he is sleeping on 1 pillow at night. he is not waking gasping for air. he denies any swelling. he has been compliant with his medications. his BP has been controlled. PCP follows labs and lipids. He otherwise denies chest pain, SOA, PND, orthopnea, syncope or near syncope. He has no other complaints. Review of Systems   Constitutional: Negative for fever, chills, diaphoresis, activity change, appetite change, fatigue and unexpected weight change. Eyes: Negative for photophobia, pain, redness and visual disturbance. Respiratory: Positive for AGUILAR. Negative for apnea, cough, chest tightness, wheezing and stridor. Cardiovascular: Negative for chest pain, palpitations and leg swelling. Gastrointestinal: Negative for abdominal distention. Genitourinary: Negative for dysuria, urgency and frequency. Musculoskeletal: Negative for myalgias, arthralgias and gait problem. Skin: Negative for color change, pallor, rash and wound. Neurological: Negative for dizziness, tremors, speech difficulty, weakness and numbness. Hematological: Does not bruise/bleed easily. Not on file    Intimate partner violence:     Fear of current or ex partner: Not on file     Emotionally abused: Not on file     Physically abused: Not on file     Forced sexual activity: Not on file   Other Topics Concern    Not on file   Social History Narrative    Not on file       No Known Allergies      Current Outpatient Medications:     furosemide (LASIX) 40 MG tablet, TAKE 1 TABLET BY MOUTH ONCE DAILY, Disp: 90 tablet, Rfl: 5    nitroGLYCERIN (NITROSTAT) 0.4 MG SL tablet, Place 1 tablet under the tongue every 5 minutes as needed for Chest pain up to max of 3 total doses.  If no relief after 1 dose, call 911., Disp: 25 tablet, Rfl: 3    simvastatin (ZOCOR) 40 MG tablet, TAKE 1 TABLET BY MOUTH ONCE DAILY, Disp: 90 tablet, Rfl: 1    spironolactone (ALDACTONE) 25 MG tablet, TAKE ONE TABLET BY MOUTH ONCE DAILY, Disp: 90 tablet, Rfl: 3    metFORMIN (GLUCOPHAGE) 1000 MG tablet, TAKE 1 TABLET BY MOUTH TWICE DAILY, Disp: 180 tablet, Rfl: 1    RELION INSULIN SYRINGE 31G X 15/64\" 1 ML MISC, USE AS DIRECTED TWICE DAILY, Disp: 200 each, Rfl: 3    pioglitazone (ACTOS) 45 MG tablet, TAKE 1 TABLET BY MOUTH ONCE DAILY, Disp: 90 tablet, Rfl: 1    metoprolol tartrate (LOPRESSOR) 50 MG tablet, TAKE 1 TABLET BY MOUTH TWICE DAILY, Disp: 180 tablet, Rfl: 3    blood glucose test strips (ACCU-CHEK ROSALBA PLUS) strip, USE  STRIP TO CHECK GLUCOSE TWICE DAILY AS NEEDED, Disp: 100 strip, Rfl: 3    ramipril (ALTACE) 5 MG capsule, TAKE ONE CAPSULE BY MOUTH ONCE DAILY, Disp: 90 capsule, Rfl: 3    insulin NPH (HUMULIN N) 100 UNIT/ML injection vial, 20 UNITS IN AM AND 10 UNITS IN PM, Disp: 1 vial, Rfl: 3    Lancets MISC, PT IS INSULIN DEPT AND CAN CHECK TWICE DAILY WHEN NEEDED   DX E11.8, Disp: 100 each, Rfl: 3    Insulin Pen Needle 32G X 4 MM MISC, 1 each by Does not apply route daily, Disp: 100 each, Rfl: 3    Multiple Vitamins-Minerals (THERAPEUTIC MULTIVITAMIN-MINERALS) tablet, Take 1 tablet by mouth daily Alive mens 50+, Disp: , Rfl:     oxybutynin (DITROPAN XL) 15 MG extended release tablet, Take 15 mg by mouth daily, Disp: , Rfl:     aspirin 325 MG tablet, Take 325 mg by mouth daily. , Disp: , Rfl:     vitamin E 400 UNIT capsule, Take 400 Units by mouth daily. , Disp: , Rfl:     PE:  Vitals:    09/04/19 1445   BP: 132/70   Pulse: 64       Estimated body mass index is 29.57 kg/m² as calculated from the following:    Height as of this encounter: 6' (1.829 m). Weight as of this encounter: 218 lb (98.9 kg). Constitutional: He is oriented to person, place, and time. He appears well-developed and well-nourished in no acute distress. Neck:  Neck supple without JVD present. No trachea deviation present. No thyromegaly present. Eyes:Conjunctivae and EOM are normal. Pupils equal and reactive to light. ENT:Hearing appears normal.conjunctiva and lids are normal, ears and nose appear normal.  Cardiovascular: Normal rate, normal rhythm, normal heart sounds. No murmur ascultated. No gallop and no friction rub. No carotid bruits. No peripheral edema. Pulmonary/Chest:  Lungs clear to auscultation bilaterally without evidence of respiratory distress. He without wheezes. He without rales or ronchi. Musculoskeletal: Normal range of motion. Gait is normal.  Head is normocephalic and atraumatic. Skin: Skin is warm and dry without rash or pallor. Psychiatric:He is alert and oriented to person, place, and time. He has a normal mood and affect. His behavior is normal. Thought content normal.       Lab Results   Component Value Date    CREATININE 1.2 04/09/2019    CREATININE 1.1 09/06/2018    CREATININE 1.0 05/31/2018    CREATININE 0.9 09/14/2011    HGB 15.0 04/09/2019    HGB 13.2 09/06/2018    HGB 13.1 08/29/2017    PROBNP 14,034 01/07/2017          Assessment, Recommendations, & Plan:  68 y.o. male with CAD, HTN, HLD, AGUILAR, & Smoking    CAD-on ASA, Zocor, & Lopressor.   No changes    HTN-controlled, no changes    HLD-followed

## 2019-09-04 NOTE — PATIENT INSTRUCTIONS
your high blood pressure and/or diabetes. Treat high cholesterol or triglycerides. Ask your doctor about taking a low-dose aspirin every day. In certain patients, taking rosuvastatin (Crestor) may be another option. Talk to your doctor. Hypertension  (High Blood Pressure)  Most people with high blood pressure (hypertension) have no symptoms. High blood pressure can be a dangerous problem. Hypertension is dangerous because you may have it and not know it. High blood pressure may mean that your heart needs to work harder to pump blood. Your blood pressure is measured with 2 numbers. The first number is when your heart flexes (contracts), and the second number is when your heart relaxes. The higher the numbers are, the more you are at risk for problems. Write down your blood pressure today. The best blood pressure for adults is 120/80 (mmHg) or lower. It is likely that your blood pressure was recorded at least 2 times today. It is important for you to give these numbers to your doctor. If you do not have a doctor, try to get follow-up care at a hospital or community clinic. You may need to start high blood pressure medicine. You may also need to adjust your medicines as told by your doctor. Even mild high blood pressure increases long-term health risks. One high reading does not mean you have hypertension. · Your blood pressure should be taken when you are relaxed. It is also important to sit for about 10 minutes before being tested. · Things that can increase your blood pressure are:  Injury, Illness, Stress, Caffeine, and some medicines (like decongestants). · High blood pressure does not usually need emergency treatment. HOME CARE  · Lifestyle and medicine changes may be needed, including:   · Weight loss. · Exercise. · Limit the use of salt. · Stop smoking. · If using decongestants or birth control pills, talk to your doctor. These medicines might make blood pressure higher.    · Do xanthomas )   Pain, enlargement, or swelling of organs such as the liver, spleen, or pancreas ( pancreatitis )   Obstruction of blood vessels in heart and brain   Hyperlipidemia can increase your risk of atherosclerosis . This is a dangerous hardening of the arteries. It can end up blocking blood flow. In some cases, this may result in:   Angina   Heart attack   Stroke   Other serious complications   Blood Vessel with Atherosclerosis       2011 [address] [facility].   Diagnosis   This condition is diagnosed with blood tests. These tests measure the levels of lipids in the blood. The National Cholesterol Education Program advises that you have your lipids checked at least once every five years, starting at age 21. Also, the American Academy of Pediatrics recommends lipid screening for children at risk (eg, a family history of hyperlipidemia). Testing may consist of a fasting blood test for:   Total cholesterol   LDL (bad cholesterol)   HDL (good cholesterol)   Triglycerides   Your doctor may recommend more frequent or earlier testing if you have:   Family history of hyperlipidemia   Risk factor or disease that may cause hyperlipidemia   Complication that may result from hyperlipidemia   Treatment   Treatment is not only aimed at correcting your cholesterol levels, but also at lowering your overall risk for heart disease and strokes. Diet Changes   Eat a diet low in total fat, saturated fat, and cholesterol . Reduce or eliminate the amount of alcohol you drink. Eat more high-fiber foods. Lifestyle Changes   If you are overweight, lose weight . If you smoke, quit . Exercise regularly . Talk to you doctor before starting an exercise program. Cristiano Coleman may already have hardening of the arteries or heart disease. These conditions increase your risk of having a heart attack while exercising. Make sure other medical conditions such as high blood pressure and diabetes are being treated and controlled. to that of a lifelong nonsmoker.)   Risk of cancers of the mouth, esophagus, larynx, bladder, pancreas, and kidney decreases. Gaining Even More Benefits   Quitting smoking has additional health benefits, such as decreased risk of peripheral vascular disease, stroke, and chronic lung disease ( bronchitis, emphysema, and asthma). Giving up cigarettes may also reduce your risk of cataracts, macular degeneration, thyroid conditions, hearing loss, erectile dysfunction, dementia, and osteoporosis. Even if you already have a chronic disease, quitting smoking may help reduce the severity of your symptoms and keep you healthier longer. Still think it is too late? You may be surprised to hear that older smokers are usually more successful at quitting smoking than younger smokers. This is especially true if they already have health problems, particularly those associated with smoking. Studies suggest that elderly persons who ask their doctors about help for smoking cessation are more likely to get that help and may be more likely to be successful quitters. At your next medical visit do not forget to ask what you and your doctor together can do to help you kick the habit. Preparing to Quit   List all the reasons you want to quit smoking and look at your list often. Get help from your doctor, a smoking cessation specialist, or a group cessation program. Discuss using nicotine replacement products (patch, chewing gum, or nasal spray) along with a behavior change program.   One week before you quit, keep a journal of when and where you smoke each cigarette. Record how you are feeling each time (eg, happy, anxious, relaxed, angry, sad, lonely). This will help you be more aware of your smoking patterns. Choose a method of quitting, such as gradually cutting back or quitting all at once. Quitting all at once tends to be most effective. Set a quit date on your calendar.      Using Helpful Strategies   On quit test takes about 2 ½ to 3 hours to complete. If for any reason you are unable to keep this appointment, please contact Outpatient Scheduling, [phone], as soon as possible to reschedule.

## 2019-09-20 DIAGNOSIS — E78.2 MIXED HYPERLIPIDEMIA: Chronic | ICD-10-CM

## 2019-09-20 DIAGNOSIS — I25.10 CORONARY ARTERY DISEASE INVOLVING NATIVE CORONARY ARTERY OF NATIVE HEART WITHOUT ANGINA PECTORIS: Chronic | ICD-10-CM

## 2019-09-20 DIAGNOSIS — I10 ESSENTIAL HYPERTENSION: Chronic | ICD-10-CM

## 2019-09-20 DIAGNOSIS — E11.8 TYPE 2 DIABETES MELLITUS WITH COMPLICATION, WITHOUT LONG-TERM CURRENT USE OF INSULIN (HCC): Chronic | ICD-10-CM

## 2019-09-20 LAB
ALBUMIN SERPL-MCNC: 4.3 G/DL (ref 3.5–5.2)
ALP BLD-CCNC: 54 U/L (ref 40–130)
ALT SERPL-CCNC: 13 U/L (ref 5–41)
ANION GAP SERPL CALCULATED.3IONS-SCNC: 14 MMOL/L (ref 7–19)
AST SERPL-CCNC: 14 U/L (ref 5–40)
BASOPHILS ABSOLUTE: 0.1 K/UL (ref 0–0.2)
BASOPHILS RELATIVE PERCENT: 0.7 % (ref 0–1)
BILIRUB SERPL-MCNC: 0.3 MG/DL (ref 0.2–1.2)
BUN BLDV-MCNC: 34 MG/DL (ref 8–23)
CALCIUM SERPL-MCNC: 9.3 MG/DL (ref 8.8–10.2)
CHLORIDE BLD-SCNC: 101 MMOL/L (ref 98–111)
CHOLESTEROL, TOTAL: 140 MG/DL (ref 160–199)
CO2: 25 MMOL/L (ref 22–29)
CREAT SERPL-MCNC: 1.1 MG/DL (ref 0.5–1.2)
EOSINOPHILS ABSOLUTE: 0.2 K/UL (ref 0–0.6)
EOSINOPHILS RELATIVE PERCENT: 2.5 % (ref 0–5)
GFR NON-AFRICAN AMERICAN: >60
GLUCOSE BLD-MCNC: 101 MG/DL (ref 74–109)
HBA1C MFR BLD: 5.8 % (ref 4–6)
HCT VFR BLD CALC: 44.2 % (ref 42–52)
HDLC SERPL-MCNC: 30 MG/DL (ref 55–121)
HEMOGLOBIN: 14.2 G/DL (ref 14–18)
IMMATURE GRANULOCYTES #: 0 K/UL
LDL CHOLESTEROL CALCULATED: 76 MG/DL
LYMPHOCYTES ABSOLUTE: 1.9 K/UL (ref 1.1–4.5)
LYMPHOCYTES RELATIVE PERCENT: 27.7 % (ref 20–40)
MCH RBC QN AUTO: 31.1 PG (ref 27–31)
MCHC RBC AUTO-ENTMCNC: 32.1 G/DL (ref 33–37)
MCV RBC AUTO: 96.9 FL (ref 80–94)
MONOCYTES ABSOLUTE: 0.8 K/UL (ref 0–0.9)
MONOCYTES RELATIVE PERCENT: 12.4 % (ref 0–10)
NEUTROPHILS ABSOLUTE: 3.8 K/UL (ref 1.5–7.5)
NEUTROPHILS RELATIVE PERCENT: 56.4 % (ref 50–65)
PDW BLD-RTO: 12.8 % (ref 11.5–14.5)
PLATELET # BLD: 248 K/UL (ref 130–400)
PMV BLD AUTO: 10.1 FL (ref 9.4–12.4)
POTASSIUM SERPL-SCNC: 5.3 MMOL/L (ref 3.5–5)
RBC # BLD: 4.56 M/UL (ref 4.7–6.1)
SODIUM BLD-SCNC: 140 MMOL/L (ref 136–145)
TOTAL PROTEIN: 6.8 G/DL (ref 6.6–8.7)
TRIGL SERPL-MCNC: 169 MG/DL (ref 0–149)
WBC # BLD: 6.7 K/UL (ref 4.8–10.8)

## 2019-09-23 ENCOUNTER — OFFICE VISIT (OUTPATIENT)
Dept: INTERNAL MEDICINE | Age: 69
End: 2019-09-23

## 2019-09-23 VITALS
SYSTOLIC BLOOD PRESSURE: 138 MMHG | OXYGEN SATURATION: 99 % | HEIGHT: 72 IN | WEIGHT: 215 LBS | BODY MASS INDEX: 29.12 KG/M2 | HEART RATE: 54 BPM | DIASTOLIC BLOOD PRESSURE: 70 MMHG

## 2019-09-23 DIAGNOSIS — I10 ESSENTIAL HYPERTENSION: Chronic | ICD-10-CM

## 2019-09-23 DIAGNOSIS — E11.8 TYPE 2 DIABETES MELLITUS WITH COMPLICATION, WITHOUT LONG-TERM CURRENT USE OF INSULIN (HCC): Primary | Chronic | ICD-10-CM

## 2019-09-23 DIAGNOSIS — E78.2 MIXED HYPERLIPIDEMIA: Chronic | ICD-10-CM

## 2019-09-23 DIAGNOSIS — Z00.00 ROUTINE GENERAL MEDICAL EXAMINATION AT A HEALTH CARE FACILITY: ICD-10-CM

## 2019-09-23 PROCEDURE — 2022F DILAT RTA XM EVC RTNOPTHY: CPT | Performed by: INTERNAL MEDICINE

## 2019-09-23 PROCEDURE — 3017F COLORECTAL CA SCREEN DOC REV: CPT | Performed by: INTERNAL MEDICINE

## 2019-09-23 PROCEDURE — G8417 CALC BMI ABV UP PARAM F/U: HCPCS | Performed by: INTERNAL MEDICINE

## 2019-09-23 PROCEDURE — 3044F HG A1C LEVEL LT 7.0%: CPT | Performed by: INTERNAL MEDICINE

## 2019-09-23 PROCEDURE — 1123F ACP DISCUSS/DSCN MKR DOCD: CPT | Performed by: INTERNAL MEDICINE

## 2019-09-23 PROCEDURE — 4004F PT TOBACCO SCREEN RCVD TLK: CPT | Performed by: INTERNAL MEDICINE

## 2019-09-23 PROCEDURE — G8427 DOCREV CUR MEDS BY ELIG CLIN: HCPCS | Performed by: INTERNAL MEDICINE

## 2019-09-23 PROCEDURE — 4040F PNEUMOC VAC/ADMIN/RCVD: CPT | Performed by: INTERNAL MEDICINE

## 2019-09-23 PROCEDURE — G0439 PPPS, SUBSEQ VISIT: HCPCS | Performed by: INTERNAL MEDICINE

## 2019-09-23 PROCEDURE — 99214 OFFICE O/P EST MOD 30 MIN: CPT | Performed by: INTERNAL MEDICINE

## 2019-09-23 PROCEDURE — G8598 ASA/ANTIPLAT THER USED: HCPCS | Performed by: INTERNAL MEDICINE

## 2019-09-23 ASSESSMENT — ENCOUNTER SYMPTOMS
SHORTNESS OF BREATH: 0
SORE THROAT: 0
EYE DISCHARGE: 0
BACK PAIN: 0
NAUSEA: 0
SINUS PRESSURE: 0
WHEEZING: 0
VOMITING: 0
ABDOMINAL DISTENTION: 0
EYE ITCHING: 0
BLOOD IN STOOL: 0
ABDOMINAL PAIN: 0
TROUBLE SWALLOWING: 0

## 2019-09-23 ASSESSMENT — PATIENT HEALTH QUESTIONNAIRE - PHQ9: SUM OF ALL RESPONSES TO PHQ QUESTIONS 1-9: 0

## 2019-09-23 ASSESSMENT — LIFESTYLE VARIABLES: HOW OFTEN DO YOU HAVE A DRINK CONTAINING ALCOHOL: 0

## 2019-09-23 NOTE — PROGRESS NOTES
[facility] INTERNAL MEDICINE  [address] [address] [place] [address]  Dept: [phone]  Dept Fax: [phone]: [phone]      Visit Date: 9/23/2019    Pete Maurice a 68 y.o. male who presents today for:  Chief Complaint   Patient presents with    Medicare AWV         HPI:     Mendoza Soto is in for his six-month visit. He is doing about the same. He is a diabetic with high blood pressure hypercholesterol. He had lab work for review and he is got no new complaints. Past Medical History:   Diagnosis Date    CAD (coronary artery disease)     CABG 2001 ALYSE to LAD, SVG Y-graft to LM 1&2    COPD (chronic obstructive pulmonary disease) ([facility] [address]     Diabetes mellitus (HCC)     Hyperlipidemia     PCP manages    Hypertension     LONG TERM ANTICOAGULENT USE     S/P CABG (coronary artery bypass graft) 8/5/2015    Tobacco abuse       Past Surgical History:   Procedure Laterality Date    APPENDECTOMY      CARDIAC CATHETERIZATION  2/26/01    EF in excesss 60%    CARDIAC CATHETERIZATION  7/22/98    EF 45%    CORONARY ARTERY BYPASS GRAFT  2/27/01    x3, LIMA to LAD,SVG Y graft to 1st & 2nd Obtuse yeimy branches       Family History   Problem Relation Age of Onset    Stroke Father        Social History     Tobacco Use    Smoking status: Current Every Day Smoker     Packs/day: 1.00     Years: 15.00     Pack years: 15.00     Types: Cigarettes    Smokeless tobacco: Former User     Types: Chew   Substance Use Topics    Alcohol use: No      Current Outpatient Medications   Medication Sig Dispense Refill    furosemide (LASIX) 40 MG tablet TAKE 1 TABLET BY MOUTH ONCE DAILY 90 tablet 5    nitroGLYCERIN (NITROSTAT) 0.4 MG SL tablet Place 1 tablet under the tongue every 5 minutes as needed for Chest pain up to max of 3 total doses.  If no relief after 1 dose, call 911. 25 tablet 3    simvastatin (ZOCOR) 40 MG tablet TAKE 1 TABLET BY MOUTH ONCE DAILY 90 tablet 1    spironolactone (ALDACTONE) 25 MG tablet TAKE ONE TABLET BY MOUTH ONCE DAILY 90 tablet 3    metFORMIN (GLUCOPHAGE) 1000 MG tablet TAKE 1 TABLET BY MOUTH TWICE DAILY 180 tablet 1    RELION INSULIN SYRINGE 31G X 15/64\" 1 ML MISC USE AS DIRECTED TWICE DAILY 200 each 3    pioglitazone (ACTOS) 45 MG tablet TAKE 1 TABLET BY MOUTH ONCE DAILY 90 tablet 1    metoprolol tartrate (LOPRESSOR) 50 MG tablet TAKE 1 TABLET BY MOUTH TWICE DAILY 180 tablet 3    blood glucose test strips (ACCU-CHEK ROSALBA PLUS) strip USE  STRIP TO CHECK GLUCOSE TWICE DAILY AS NEEDED 100 strip 3    ramipril (ALTACE) 5 MG capsule TAKE ONE CAPSULE BY MOUTH ONCE DAILY 90 capsule 3    insulin NPH (HUMULIN N) 100 UNIT/ML injection vial 20 UNITS IN AM AND 10 UNITS IN PM 1 vial 3    Lancets MISC PT IS INSULIN DEPT AND CAN CHECK TWICE DAILY WHEN NEEDED   DX E11.8 100 each 3    Insulin Pen Needle 32G X 4 MM MISC 1 each by Does not apply route daily 100 each 3    Multiple Vitamins-Minerals (THERAPEUTIC MULTIVITAMIN-MINERALS) tablet Take 1 tablet by mouth daily Alive mens 50+      oxybutynin (DITROPAN XL) 15 MG extended release tablet Take 15 mg by mouth daily      aspirin 325 MG tablet Take 325 mg by mouth daily.  vitamin E 400 UNIT capsule Take 400 Units by mouth daily. No current facility-administered medications for this visit. No Known Allergies      Subjective:     Review of Systems   Constitutional: Negative for activity change, appetite change, fatigue and fever. HENT: Negative for congestion, hearing loss, sinus pressure, sore throat and trouble swallowing. Eyes: Negative for discharge and itching. Respiratory: Negative for shortness of breath and wheezing. Cardiovascular: Negative for chest pain, palpitations and leg swelling. Gastrointestinal: Negative for abdominal distention, abdominal pain, blood in stool, nausea and vomiting. Endocrine: Negative for cold intolerance, heat intolerance and polydipsia. year.     Patient given educational materials- see patient instructions. Discussed use, benefit, and side effects of prescribedmedications. All patient questions answered. Pt voiced understanding. Reviewedhealth maintenance. Instructed to continue current medications, diet and exercise. Patient agreed with treatment plan. **This report has been created usingvoice recognition software. It may contain minor errors which are inherent in voicerecognition technology. **    Electronically signed by Slava López MD on 2019 at 11:39 AM         Medicare Annual Wellness Visit  Name: Mary Nixon Date: 2019   MRN: [id number] Sex: Male   Age: 68 y.o. Ethnicity: Non-/Non    : 1950 Race: Sahara Strickland is here for Medicare AW    Screenings for behavioral, psychosocial and functional/safety risks, and cognitive dysfunction are all negative except as indicated below. These results, as well as other patient data from the [address] form, are documented in Flowsheets linked to this Encounter. No Known Allergiesic  Prior to Visit Medications    Medication Sig Taking? Authorizing Provider   furosemide (LASIX) 40 MG tablet TAKE 1 TABLET BY MOUTH ONCE DAILY  JULIEN Simmons   nitroGLYCERIN (NITROSTAT) 0.4 MG SL tablet Place 1 tablet under the tongue every 5 minutes as needed for Chest pain up to max of 3 total doses. If no relief after 1 dose, call 911.   JULIEN Garcia   simvastatin (ZOCOR) 40 MG tablet TAKE 1 TABLET BY MOUTH ONCE DAILY  Slava López MD   spironolactone (ALDACTONE) 25 MG tablet TAKE ONE TABLET BY MOUTH ONCE DAILY  JULIEN Esparza   metFORMIN (GLUCOPHAGE) 1000 MG tablet TAKE 1 TABLET BY MOUTH TWICE DAILY  Slava López MD   RELION INSULIN SYRINGE 31G X 1564\" 1 ML MISC USE AS DIRECTED TWICE DAILY  Slava López MD   pioglitazone (ACTOS) 45 MG tablet TAKE 1 TABLET BY MOUTH Jaimie Garcia MD 03/05/2018, 06/05/2018        Health Maintenance   Topic Date Due    Colon cancer screen colonoscopy  07/10/2018    Flu vaccine (1) 09/01/2019    Diabetic retinal exam  12/27/2019 (Originally 6/12/1960)    Pneumococcal 65+ years Vaccine (2 of 2 - PPSV23) 05/29/2020 (Originally 3/5/2019)    Shingles Vaccine (1 of 2) 12/31/2020 (Originally 6/12/1998)    Diabetic foot exam  12/30/2022 (Originally 6/12/1960)    AAA screen  12/25/2025 (Originally 1950)    Diabetic microalbuminuria test  12/10/2019    A1C test (Diabetic or Prediabetic)  09/20/2020    Lipid screen  09/20/2020    Potassium monitoring  09/20/2020    Creatinine monitoring  09/20/2020    DTaP/Tdap/Td vaccine (3 - Td) 06/05/2028    Hepatitis C screen  Completed     Recommendations for Preventive Services Due: see orders and patient instructions/AVS.  . Recommended screening schedule for the next 5-10 years is provided to the patient in written form: see Patient Nicholas Estevez was seen today for medicare awv. Diagnoses and all orders for this visit:    Type 2 diabetes mellitus with complication, without long-term current use of insulin (HCC)  -     CBC Auto Differential; Future  -     Comprehensive Metabolic Panel; Future  -     Hemoglobin A1C; Future  -     Lipid Panel; Future  -     Psa screening; Future    Mixed hyperlipidemia  -     CBC Auto Differential; Future  -     Comprehensive Metabolic Panel; Future  -     Hemoglobin A1C; Future  -     Lipid Panel; Future  -     Psa screening; Future    Essential hypertension  -     CBC Auto Differential; Future  -     Comprehensive Metabolic Panel; Future  -     Hemoglobin A1C; Future  -     Lipid Panel; Future  -     Psa screening;  Future    Routine general medical examination at a health care facility

## 2019-10-02 ENCOUNTER — HOSPITAL ENCOUNTER (OUTPATIENT)
Dept: NON INVASIVE DIAGNOSTICS | Age: 69
Discharge: HOME | End: 2019-10-02
Payer: MEDICARE

## 2019-10-02 ENCOUNTER — HOSPITAL ENCOUNTER (OUTPATIENT)
Dept: NUCLEAR MEDICINE | Age: 69
Discharge: HOME | End: 2019-10-04
Payer: MEDICARE

## 2019-10-02 DIAGNOSIS — R06.09 DOE (DYSPNEA ON EXERTION): ICD-10-CM

## 2019-10-02 DIAGNOSIS — I25.10 CORONARY ARTERY DISEASE INVOLVING NATIVE CORONARY ARTERY OF NATIVE HEART WITHOUT ANGINA PECTORIS: ICD-10-CM

## 2019-10-02 PROCEDURE — 3430000000 HC RX DIAGNOSTIC RADIOPHARMACEUTICAL: Performed by: NURSE PRACTITIONER

## 2019-10-02 PROCEDURE — A9500 TC99M SESTAMIBI: HCPCS | Performed by: NURSE PRACTITIONER

## 2019-10-02 PROCEDURE — 6360000002 HC RX W HCPCS: Performed by: INTERNAL MEDICINE

## 2019-10-02 PROCEDURE — 93017 CV STRESS TEST TRACING ONLY: CPT

## 2019-10-02 PROCEDURE — 78452 HT MUSCLE IMAGE SPECT MULT: CPT

## 2019-10-02 RX ADMIN — TETRAKIS(2-METHOXYISOBUTYLISOCYANIDE)COPPER(I) TETRAFLUOROBORATE 10 MILLICURIE: 1 INJECTION, POWDER, LYOPHILIZED, FOR SOLUTION INTRAVENOUS at 14:14

## 2019-10-02 RX ADMIN — TETRAKIS(2-METHOXYISOBUTYLISOCYANIDE)COPPER(I) TETRAFLUOROBORATE 30 MILLICURIE: 1 INJECTION, POWDER, LYOPHILIZED, FOR SOLUTION INTRAVENOUS at 14:14

## 2019-10-02 RX ADMIN — REGADENOSON 0.4 MG: 0.08 INJECTION, SOLUTION INTRAVENOUS at 13:30

## 2019-10-03 RX ORDER — PIOGLITAZONEHYDROCHLORIDE 45 MG/1
TABLET ORAL
Qty: 90 TABLET | Refills: 1 | Status: SHIPPED | OUTPATIENT
Start: 2019-10-03 | End: 2020-04-01

## 2019-10-04 LAB
LV EF: 44 %
LVEF MODALITY: NORMAL

## 2019-10-07 ENCOUNTER — TELEPHONE (OUTPATIENT)
Dept: CARDIOLOGY | Age: 69
End: 2019-10-07

## 2019-10-17 ENCOUNTER — HOSPITAL ENCOUNTER (INPATIENT)
Dept: CARDIAC CATH/INVASIVE PROCEDURES | Age: 69
LOS: 1 days | Discharge: HOME | DRG: 247 | End: 2019-10-18
Attending: INTERNAL MEDICINE | Admitting: INTERNAL MEDICINE
Payer: MEDICARE

## 2019-10-17 PROBLEM — I25.10 CAD IN NATIVE ARTERY: Status: ACTIVE | Noted: 2019-10-17

## 2019-10-17 LAB
ALBUMIN SERPL-MCNC: 4.2 G/DL (ref 3.5–5.2)
ALP BLD-CCNC: 57 U/L (ref 40–130)
ALT SERPL-CCNC: 17 U/L (ref 5–41)
ANION GAP SERPL CALCULATED.3IONS-SCNC: 11 MMOL/L (ref 7–19)
AST SERPL-CCNC: 15 U/L (ref 5–40)
BILIRUB SERPL-MCNC: <0.2 MG/DL (ref 0.2–1.2)
BUN BLDV-MCNC: 30 MG/DL (ref 8–23)
CALCIUM SERPL-MCNC: 9.1 MG/DL (ref 8.8–10.2)
CHLORIDE BLD-SCNC: 105 MMOL/L (ref 98–111)
CO2: 27 MMOL/L (ref 22–29)
CREAT SERPL-MCNC: 1.1 MG/DL (ref 0.5–1.2)
GFR NON-AFRICAN AMERICAN: >60
GLUCOSE BLD-MCNC: 103 MG/DL (ref 70–99)
GLUCOSE BLD-MCNC: 194 MG/DL (ref 70–99)
GLUCOSE BLD-MCNC: 216 MG/DL (ref 70–99)
GLUCOSE BLD-MCNC: 51 MG/DL (ref 74–109)
HCT VFR BLD CALC: 43.8 % (ref 42–52)
HEMOGLOBIN: 14.1 G/DL (ref 14–18)
MCH RBC QN AUTO: 31.8 PG (ref 27–31)
MCHC RBC AUTO-ENTMCNC: 32.2 G/DL (ref 33–37)
MCV RBC AUTO: 98.9 FL (ref 80–94)
PDW BLD-RTO: 12.5 % (ref 11.5–14.5)
PERFORMED ON: ABNORMAL
PLATELET # BLD: 253 K/UL (ref 130–400)
PMV BLD AUTO: 9.6 FL (ref 9.4–12.4)
POTASSIUM SERPL-SCNC: 3.7 MMOL/L (ref 3.5–5)
RBC # BLD: 4.43 M/UL (ref 4.7–6.1)
SODIUM BLD-SCNC: 143 MMOL/L (ref 136–145)
TOTAL PROTEIN: 7 G/DL (ref 6.6–8.7)
WBC # BLD: 8.2 K/UL (ref 4.8–10.8)

## 2019-10-17 PROCEDURE — 2580000003 HC RX 258: Performed by: INTERNAL MEDICINE

## 2019-10-17 PROCEDURE — 6360000004 HC RX CONTRAST MEDICATION: Performed by: INTERNAL MEDICINE

## 2019-10-17 PROCEDURE — 36415 COLL VENOUS BLD VENIPUNCTURE: CPT

## 2019-10-17 PROCEDURE — X2C0361 EXTIRPATION OF MATTER FROM CORONARY ARTERY, ONE ARTERY USING ORBITAL ATHERECTOMY TECHNOLOGY, PERCUTANEOUS APPROACH, NEW TECHNOLOGY GROUP 1: ICD-10-PCS | Performed by: INTERNAL MEDICINE

## 2019-10-17 PROCEDURE — 99152 MOD SED SAME PHYS/QHP 5/>YRS: CPT

## 2019-10-17 PROCEDURE — 80053 COMPREHEN METABOLIC PANEL: CPT

## 2019-10-17 PROCEDURE — 93459 L HRT ART/GRFT ANGIO: CPT

## 2019-10-17 PROCEDURE — 92928 PRQ TCAT PLMT NTRAC ST 1 LES: CPT

## 2019-10-17 PROCEDURE — 82948 REAGENT STRIP/BLOOD GLUCOSE: CPT

## 2019-10-17 PROCEDURE — C1874 STENT, COATED/COV W/DEL SYS: HCPCS

## 2019-10-17 PROCEDURE — C1724 CATH, TRANS ATHEREC,ROTATION: HCPCS

## 2019-10-17 PROCEDURE — 2500000003 HC RX 250 WO HCPCS

## 2019-10-17 PROCEDURE — 2140000000 HC CCU INTERMEDIATE R&B

## 2019-10-17 PROCEDURE — C1887 CATHETER, GUIDING: HCPCS

## 2019-10-17 PROCEDURE — 92933 PRQ TRLML C ATHRC ST ANGIOP1: CPT | Performed by: INTERNAL MEDICINE

## 2019-10-17 PROCEDURE — 92928 PRQ TCAT PLMT NTRAC ST 1 LES: CPT | Performed by: INTERNAL MEDICINE

## 2019-10-17 PROCEDURE — 99152 MOD SED SAME PHYS/QHP 5/>YRS: CPT | Performed by: INTERNAL MEDICINE

## 2019-10-17 PROCEDURE — 6360000002 HC RX W HCPCS

## 2019-10-17 PROCEDURE — C1760 CLOSURE DEV, VASC: HCPCS

## 2019-10-17 PROCEDURE — 99153 MOD SED SAME PHYS/QHP EA: CPT

## 2019-10-17 PROCEDURE — C1769 GUIDE WIRE: HCPCS

## 2019-10-17 PROCEDURE — 027035Z DILATION OF CORONARY ARTERY, ONE ARTERY WITH TWO DRUG-ELUTING INTRALUMINAL DEVICES, PERCUTANEOUS APPROACH: ICD-10-PCS | Performed by: INTERNAL MEDICINE

## 2019-10-17 PROCEDURE — 4A023N7 MEASUREMENT OF CARDIAC SAMPLING AND PRESSURE, LEFT HEART, PERCUTANEOUS APPROACH: ICD-10-PCS | Performed by: INTERNAL MEDICINE

## 2019-10-17 PROCEDURE — C1725 CATH, TRANSLUMIN NON-LASER: HCPCS

## 2019-10-17 PROCEDURE — 2709999900 HC NON-CHARGEABLE SUPPLY

## 2019-10-17 PROCEDURE — B2111ZZ FLUOROSCOPY OF MULTIPLE CORONARY ARTERIES USING LOW OSMOLAR CONTRAST: ICD-10-PCS | Performed by: INTERNAL MEDICINE

## 2019-10-17 PROCEDURE — 93459 L HRT ART/GRFT ANGIO: CPT | Performed by: INTERNAL MEDICINE

## 2019-10-17 PROCEDURE — 6370000000 HC RX 637 (ALT 250 FOR IP): Performed by: INTERNAL MEDICINE

## 2019-10-17 PROCEDURE — B2131ZZ FLUOROSCOPY OF MULTIPLE CORONARY ARTERY BYPASS GRAFTS USING LOW OSMOLAR CONTRAST: ICD-10-PCS | Performed by: INTERNAL MEDICINE

## 2019-10-17 PROCEDURE — C1894 INTRO/SHEATH, NON-LASER: HCPCS

## 2019-10-17 PROCEDURE — C9602 PERC D-E COR STENT ATHER S: HCPCS

## 2019-10-17 PROCEDURE — B2151ZZ FLUOROSCOPY OF LEFT HEART USING LOW OSMOLAR CONTRAST: ICD-10-PCS | Performed by: INTERNAL MEDICINE

## 2019-10-17 PROCEDURE — 85027 COMPLETE CBC AUTOMATED: CPT

## 2019-10-17 PROCEDURE — 93005 ELECTROCARDIOGRAM TRACING: CPT | Performed by: INTERNAL MEDICINE

## 2019-10-17 PROCEDURE — 6370000000 HC RX 637 (ALT 250 FOR IP)

## 2019-10-17 RX ORDER — FENTANYL CITRATE 50 UG/ML
25 INJECTION, SOLUTION INTRAMUSCULAR; INTRAVENOUS
Status: ACTIVE | OUTPATIENT
Start: 2019-10-17 | End: 2019-10-17

## 2019-10-17 RX ORDER — OXYBUTYNIN CHLORIDE 5 MG/1
15 TABLET, EXTENDED RELEASE ORAL DAILY
Status: DISCONTINUED | OUTPATIENT
Start: 2019-10-18 | End: 2019-10-18 | Stop reason: HOSPADM

## 2019-10-17 RX ORDER — DEXTROSE MONOHYDRATE 50 MG/ML
INJECTION, SOLUTION INTRAVENOUS CONTINUOUS
Status: DISCONTINUED | OUTPATIENT
Start: 2019-10-17 | End: 2019-10-18 | Stop reason: HOSPADM

## 2019-10-17 RX ORDER — SODIUM CHLORIDE 9 MG/ML
INJECTION, SOLUTION INTRAVENOUS CONTINUOUS
Status: DISCONTINUED | OUTPATIENT
Start: 2019-10-17 | End: 2019-10-17 | Stop reason: CLARIF

## 2019-10-17 RX ORDER — ASPIRIN 81 MG/1
81 TABLET, CHEWABLE ORAL DAILY
Status: DISCONTINUED | OUTPATIENT
Start: 2019-10-18 | End: 2019-10-18 | Stop reason: HOSPADM

## 2019-10-17 RX ORDER — ONDANSETRON 2 MG/ML
4 INJECTION INTRAMUSCULAR; INTRAVENOUS EVERY 6 HOURS PRN
Status: DISCONTINUED | OUTPATIENT
Start: 2019-10-17 | End: 2019-10-18 | Stop reason: HOSPADM

## 2019-10-17 RX ORDER — PIOGLITAZONEHYDROCHLORIDE 45 MG/1
45 TABLET ORAL DAILY
Status: DISCONTINUED | OUTPATIENT
Start: 2019-10-18 | End: 2019-10-18 | Stop reason: HOSPADM

## 2019-10-17 RX ORDER — ALPRAZOLAM 0.5 MG/1
0.5 TABLET ORAL
Status: DISPENSED | OUTPATIENT
Start: 2019-10-17 | End: 2019-10-17

## 2019-10-17 RX ORDER — HYDROCODONE BITARTRATE AND ACETAMINOPHEN 5; 325 MG/1; MG/1
1 TABLET ORAL EVERY 4 HOURS PRN
Status: DISCONTINUED | OUTPATIENT
Start: 2019-10-17 | End: 2019-10-18 | Stop reason: HOSPADM

## 2019-10-17 RX ORDER — CLOPIDOGREL BISULFATE 75 MG/1
75 TABLET ORAL DAILY
Status: DISCONTINUED | OUTPATIENT
Start: 2019-10-17 | End: 2019-10-18 | Stop reason: HOSPADM

## 2019-10-17 RX ORDER — SODIUM CHLORIDE 0.9 % (FLUSH) 0.9 %
10 SYRINGE (ML) INJECTION PRN
Status: DISCONTINUED | OUTPATIENT
Start: 2019-10-17 | End: 2019-10-18 | Stop reason: HOSPADM

## 2019-10-17 RX ORDER — FUROSEMIDE 40 MG/1
40 TABLET ORAL DAILY
Status: DISCONTINUED | OUTPATIENT
Start: 2019-10-18 | End: 2019-10-18 | Stop reason: HOSPADM

## 2019-10-17 RX ORDER — ATROPINE SULFATE 0.4 MG/ML
0.5 AMPUL (ML) INJECTION
Status: ACTIVE | OUTPATIENT
Start: 2019-10-17 | End: 2019-10-17

## 2019-10-17 RX ORDER — SIMVASTATIN 40 MG
40 TABLET ORAL DAILY
Status: DISCONTINUED | OUTPATIENT
Start: 2019-10-18 | End: 2019-10-18 | Stop reason: HOSPADM

## 2019-10-17 RX ORDER — RAMIPRIL 2.5 MG/1
5 CAPSULE ORAL DAILY
Status: DISCONTINUED | OUTPATIENT
Start: 2019-10-18 | End: 2019-10-18 | Stop reason: HOSPADM

## 2019-10-17 RX ORDER — SODIUM CHLORIDE 0.9 % (FLUSH) 0.9 %
10 SYRINGE (ML) INJECTION EVERY 12 HOURS SCHEDULED
Status: DISCONTINUED | OUTPATIENT
Start: 2019-10-17 | End: 2019-10-18 | Stop reason: HOSPADM

## 2019-10-17 RX ORDER — METOPROLOL TARTRATE 50 MG/1
50 TABLET, FILM COATED ORAL 2 TIMES DAILY
Status: DISCONTINUED | OUTPATIENT
Start: 2019-10-17 | End: 2019-10-18 | Stop reason: HOSPADM

## 2019-10-17 RX ORDER — SODIUM CHLORIDE 9 MG/ML
INJECTION, SOLUTION INTRAVENOUS CONTINUOUS
Status: DISCONTINUED | OUTPATIENT
Start: 2019-10-17 | End: 2019-10-18 | Stop reason: HOSPADM

## 2019-10-17 RX ORDER — HYDROCODONE BITARTRATE AND ACETAMINOPHEN 5; 325 MG/1; MG/1
2 TABLET ORAL EVERY 4 HOURS PRN
Status: DISCONTINUED | OUTPATIENT
Start: 2019-10-17 | End: 2019-10-18 | Stop reason: HOSPADM

## 2019-10-17 RX ORDER — SPIRONOLACTONE 25 MG/1
25 TABLET ORAL DAILY
Status: DISCONTINUED | OUTPATIENT
Start: 2019-10-18 | End: 2019-10-18 | Stop reason: HOSPADM

## 2019-10-17 RX ORDER — ACETAMINOPHEN 325 MG/1
650 TABLET ORAL EVERY 4 HOURS PRN
Status: DISCONTINUED | OUTPATIENT
Start: 2019-10-17 | End: 2019-10-18 | Stop reason: HOSPADM

## 2019-10-17 RX ORDER — NITROGLYCERIN 0.4 MG/1
0.4 TABLET SUBLINGUAL EVERY 5 MIN PRN
Status: DISCONTINUED | OUTPATIENT
Start: 2019-10-17 | End: 2019-10-18 | Stop reason: HOSPADM

## 2019-10-17 RX ORDER — ASPIRIN 325 MG
325 TABLET ORAL ONCE
Status: DISCONTINUED | OUTPATIENT
Start: 2019-10-17 | End: 2019-10-18 | Stop reason: HOSPADM

## 2019-10-17 RX ADMIN — METOPROLOL TARTRATE 50 MG: 50 TABLET ORAL at 21:27

## 2019-10-17 RX ADMIN — DEXTROSE MONOHYDRATE: 50 INJECTION, SOLUTION INTRAVENOUS at 07:47

## 2019-10-17 RX ADMIN — SODIUM CHLORIDE: 9 INJECTION, SOLUTION INTRAVENOUS at 07:31

## 2019-10-17 RX ADMIN — IOPAMIDOL 250 ML: 612 INJECTION, SOLUTION INTRAVENOUS at 12:25

## 2019-10-17 RX ADMIN — DEXTROSE MONOHYDRATE: 50 INJECTION, SOLUTION INTRAVENOUS at 08:15

## 2019-10-17 RX ADMIN — Medication 10 ML: at 21:27

## 2019-10-17 RX ADMIN — INSULIN HUMAN 30 UNITS: 100 INJECTION, SUSPENSION SUBCUTANEOUS at 21:33

## 2019-10-17 RX ADMIN — DEXTROSE MONOHYDRATE: 50 INJECTION, SOLUTION INTRAVENOUS at 12:33

## 2019-10-17 RX ADMIN — CLOPIDOGREL BISULFATE 75 MG: 75 TABLET ORAL at 21:27

## 2019-10-17 ASSESSMENT — ENCOUNTER SYMPTOMS
SHORTNESS OF BREATH: 1
BLOOD IN STOOL: 0
DIARRHEA: 0
ABDOMINAL DISTENTION: 0
VOMITING: 0
WHEEZING: 0
COUGH: 0
BACK PAIN: 0
ABDOMINAL PAIN: 0

## 2019-10-17 ASSESSMENT — PAIN SCALES - GENERAL
PAINLEVEL_OUTOF10: 0

## 2019-10-18 VITALS
OXYGEN SATURATION: 95 % | HEIGHT: 72 IN | HEART RATE: 51 BPM | BODY MASS INDEX: 29.28 KG/M2 | RESPIRATION RATE: 14 BRPM | DIASTOLIC BLOOD PRESSURE: 68 MMHG | TEMPERATURE: 98.1 F | WEIGHT: 216.19 LBS | SYSTOLIC BLOOD PRESSURE: 117 MMHG

## 2019-10-18 LAB
ANION GAP SERPL CALCULATED.3IONS-SCNC: 14 MMOL/L (ref 7–19)
BUN BLDV-MCNC: 23 MG/DL (ref 8–23)
CALCIUM SERPL-MCNC: 9.2 MG/DL (ref 8.8–10.2)
CHLORIDE BLD-SCNC: 102 MMOL/L (ref 98–111)
CO2: 24 MMOL/L (ref 22–29)
CREAT SERPL-MCNC: 1.1 MG/DL (ref 0.5–1.2)
EKG P AXIS: 63 DEGREES
EKG P-R INTERVAL: 146 MS
EKG Q-T INTERVAL: 474 MS
EKG QRS DURATION: 158 MS
EKG QTC CALCULATION (BAZETT): 474 MS
EKG T AXIS: 48 DEGREES
GFR NON-AFRICAN AMERICAN: >60
GLUCOSE BLD-MCNC: 142 MG/DL (ref 70–99)
GLUCOSE BLD-MCNC: 161 MG/DL (ref 74–109)
HCT VFR BLD CALC: 39.5 % (ref 42–52)
HEMOGLOBIN: 12.6 G/DL (ref 14–18)
MCH RBC QN AUTO: 31.3 PG (ref 27–31)
MCHC RBC AUTO-ENTMCNC: 31.9 G/DL (ref 33–37)
MCV RBC AUTO: 98 FL (ref 80–94)
PDW BLD-RTO: 12.6 % (ref 11.5–14.5)
PERFORMED ON: ABNORMAL
PLATELET # BLD: 218 K/UL (ref 130–400)
PMV BLD AUTO: 10.1 FL (ref 9.4–12.4)
POTASSIUM SERPL-SCNC: 4.4 MMOL/L (ref 3.5–5)
RBC # BLD: 4.03 M/UL (ref 4.7–6.1)
SODIUM BLD-SCNC: 140 MMOL/L (ref 136–145)
WBC # BLD: 7 K/UL (ref 4.8–10.8)

## 2019-10-18 PROCEDURE — 82948 REAGENT STRIP/BLOOD GLUCOSE: CPT

## 2019-10-18 PROCEDURE — 6370000000 HC RX 637 (ALT 250 FOR IP): Performed by: INTERNAL MEDICINE

## 2019-10-18 PROCEDURE — 80048 BASIC METABOLIC PNL TOTAL CA: CPT

## 2019-10-18 PROCEDURE — 85027 COMPLETE CBC AUTOMATED: CPT

## 2019-10-18 PROCEDURE — 36415 COLL VENOUS BLD VENIPUNCTURE: CPT

## 2019-10-18 RX ORDER — ASPIRIN 81 MG/1
81 TABLET, CHEWABLE ORAL DAILY
Qty: 100 TABLET | Refills: 3 | Status: SHIPPED | OUTPATIENT
Start: 2019-10-18 | End: 2019-10-24 | Stop reason: CLARIF

## 2019-10-18 RX ORDER — CLOPIDOGREL BISULFATE 75 MG/1
75 TABLET ORAL DAILY
Qty: 90 TABLET | Refills: 3 | Status: SHIPPED | OUTPATIENT
Start: 2019-10-18 | End: 2020-10-13

## 2019-10-18 RX ADMIN — RAMIPRIL 5 MG: 2.5 CAPSULE ORAL at 08:27

## 2019-10-18 RX ADMIN — OXYBUTYNIN CHLORIDE 15 MG: 5 TABLET, EXTENDED RELEASE ORAL at 08:27

## 2019-10-18 RX ADMIN — ASPIRIN 81 MG 81 MG: 81 TABLET ORAL at 08:27

## 2019-10-18 RX ADMIN — FUROSEMIDE 40 MG: 40 TABLET ORAL at 08:27

## 2019-10-18 RX ADMIN — SIMVASTATIN 40 MG: 40 TABLET, FILM COATED ORAL at 08:27

## 2019-10-18 RX ADMIN — PIOGLITAZONE 45 MG: 45 TABLET ORAL at 08:28

## 2019-10-18 RX ADMIN — METOPROLOL TARTRATE 50 MG: 50 TABLET ORAL at 08:27

## 2019-10-18 RX ADMIN — INSULIN HUMAN 30 UNITS: 100 INJECTION, SUSPENSION SUBCUTANEOUS at 08:36

## 2019-10-18 RX ADMIN — CLOPIDOGREL BISULFATE 75 MG: 75 TABLET ORAL at 08:27

## 2019-10-18 RX ADMIN — SPIRONOLACTONE 25 MG: 25 TABLET ORAL at 08:27

## 2019-10-18 ASSESSMENT — PAIN SCALES - GENERAL
PAINLEVEL_OUTOF10: 0

## 2019-10-19 ENCOUNTER — IMMUNIZATION (OUTPATIENT)
Dept: RETAIL CLINIC | Facility: CLINIC | Age: 69
End: 2019-10-19

## 2019-10-19 DIAGNOSIS — Z23 FLU VACCINE NEED: Primary | ICD-10-CM

## 2019-10-19 PROCEDURE — 90653 IIV ADJUVANT VACCINE IM: CPT | Performed by: NURSE PRACTITIONER

## 2019-10-19 PROCEDURE — G0008 ADMIN INFLUENZA VIRUS VAC: HCPCS | Performed by: NURSE PRACTITIONER

## 2019-10-21 ENCOUNTER — CARE COORDINATION (OUTPATIENT)
Dept: CASE MANAGEMENT | Age: 69
End: 2019-10-21

## 2019-10-21 ENCOUNTER — TELEPHONE (OUTPATIENT)
Dept: CARDIOLOGY | Age: 69
End: 2019-10-21

## 2019-10-21 DIAGNOSIS — E78.2 MIXED HYPERLIPIDEMIA: Chronic | ICD-10-CM

## 2019-10-21 DIAGNOSIS — E11.8 TYPE 2 DIABETES MELLITUS WITH COMPLICATION, WITHOUT LONG-TERM CURRENT USE OF INSULIN (HCC): Chronic | ICD-10-CM

## 2019-10-21 DIAGNOSIS — I10 ESSENTIAL HYPERTENSION: Chronic | ICD-10-CM

## 2019-10-21 LAB
ALBUMIN SERPL-MCNC: 4.2 G/DL (ref 3.5–5.2)
ALP BLD-CCNC: 62 U/L (ref 40–130)
ALT SERPL-CCNC: 18 U/L (ref 5–41)
ANION GAP SERPL CALCULATED.3IONS-SCNC: 13 MMOL/L (ref 7–19)
AST SERPL-CCNC: 16 U/L (ref 5–40)
BASOPHILS ABSOLUTE: 0.1 K/UL (ref 0–0.2)
BASOPHILS RELATIVE PERCENT: 1 % (ref 0–1)
BILIRUB SERPL-MCNC: 0.4 MG/DL (ref 0.2–1.2)
BUN BLDV-MCNC: 27 MG/DL (ref 8–23)
CALCIUM SERPL-MCNC: 9.1 MG/DL (ref 8.8–10.2)
CHLORIDE BLD-SCNC: 100 MMOL/L (ref 98–111)
CHOLESTEROL, TOTAL: 153 MG/DL (ref 160–199)
CO2: 25 MMOL/L (ref 22–29)
CREAT SERPL-MCNC: 1 MG/DL (ref 0.5–1.2)
EOSINOPHILS ABSOLUTE: 0.2 K/UL (ref 0–0.6)
EOSINOPHILS RELATIVE PERCENT: 2.7 % (ref 0–5)
GFR NON-AFRICAN AMERICAN: >60
GLUCOSE BLD-MCNC: 96 MG/DL (ref 74–109)
HBA1C MFR BLD: 5.9 % (ref 4–6)
HCT VFR BLD CALC: 42.8 % (ref 42–52)
HDLC SERPL-MCNC: 30 MG/DL (ref 55–121)
HEMOGLOBIN: 13.6 G/DL (ref 14–18)
IMMATURE GRANULOCYTES #: 0 K/UL
LDL CHOLESTEROL CALCULATED: 87 MG/DL
LYMPHOCYTES ABSOLUTE: 1.6 K/UL (ref 1.1–4.5)
LYMPHOCYTES RELATIVE PERCENT: 25.3 % (ref 20–40)
MCH RBC QN AUTO: 30.9 PG (ref 27–31)
MCHC RBC AUTO-ENTMCNC: 31.8 G/DL (ref 33–37)
MCV RBC AUTO: 97.3 FL (ref 80–94)
MONOCYTES ABSOLUTE: 0.6 K/UL (ref 0–0.9)
MONOCYTES RELATIVE PERCENT: 10.3 % (ref 0–10)
NEUTROPHILS ABSOLUTE: 3.7 K/UL (ref 1.5–7.5)
NEUTROPHILS RELATIVE PERCENT: 60.2 % (ref 50–65)
PDW BLD-RTO: 12.5 % (ref 11.5–14.5)
PLATELET # BLD: 237 K/UL (ref 130–400)
PMV BLD AUTO: 9.6 FL (ref 9.4–12.4)
POTASSIUM SERPL-SCNC: 4.7 MMOL/L (ref 3.5–5)
PROSTATE SPECIFIC ANTIGEN: 1.28 NG/ML (ref 0–4)
RBC # BLD: 4.4 M/UL (ref 4.7–6.1)
SODIUM BLD-SCNC: 138 MMOL/L (ref 136–145)
TOTAL PROTEIN: 7.1 G/DL (ref 6.6–8.7)
TRIGL SERPL-MCNC: 182 MG/DL (ref 0–149)
WBC # BLD: 6.2 K/UL (ref 4.8–10.8)

## 2019-10-22 DIAGNOSIS — Z79.4 TYPE 2 DIABETES MELLITUS WITHOUT COMPLICATION, WITH LONG-TERM CURRENT USE OF INSULIN (HCC): ICD-10-CM

## 2019-10-22 DIAGNOSIS — E11.9 TYPE 2 DIABETES MELLITUS WITHOUT COMPLICATION, WITH LONG-TERM CURRENT USE OF INSULIN (HCC): ICD-10-CM

## 2019-10-23 ENCOUNTER — CARE COORDINATION (OUTPATIENT)
Dept: CASE MANAGEMENT | Age: 69
End: 2019-10-23

## 2019-10-24 ENCOUNTER — OFFICE VISIT (OUTPATIENT)
Dept: INTERNAL MEDICINE | Age: 69
End: 2019-10-24
Payer: MEDICARE

## 2019-10-24 VITALS
DIASTOLIC BLOOD PRESSURE: 80 MMHG | SYSTOLIC BLOOD PRESSURE: 132 MMHG | BODY MASS INDEX: 29.43 KG/M2 | OXYGEN SATURATION: 98 % | HEART RATE: 72 BPM | WEIGHT: 217 LBS

## 2019-10-24 DIAGNOSIS — I25.10 CORONARY ARTERY DISEASE INVOLVING NATIVE HEART WITHOUT ANGINA PECTORIS, UNSPECIFIED VESSEL OR LESION TYPE: Primary | ICD-10-CM

## 2019-10-24 PROCEDURE — 1111F DSCHRG MED/CURRENT MED MERGE: CPT | Performed by: INTERNAL MEDICINE

## 2019-10-24 PROCEDURE — 99496 TRANSJ CARE MGMT HIGH F2F 7D: CPT | Performed by: INTERNAL MEDICINE

## 2019-10-24 ASSESSMENT — ENCOUNTER SYMPTOMS
NAUSEA: 0
VOMITING: 0
SINUS PRESSURE: 0
SORE THROAT: 0
TROUBLE SWALLOWING: 0
WHEEZING: 0
BLOOD IN STOOL: 0
SHORTNESS OF BREATH: 1
BACK PAIN: 0
EYE ITCHING: 0
ABDOMINAL PAIN: 0
ABDOMINAL DISTENTION: 0
EYE DISCHARGE: 0

## 2019-10-25 RX ORDER — VARENICLINE TARTRATE 0.5 MG/1
.5-1 TABLET, FILM COATED ORAL SEE ADMIN INSTRUCTIONS
Qty: 57 TABLET | Refills: 0 | Status: SHIPPED | OUTPATIENT
Start: 2019-10-25 | End: 2019-12-04

## 2019-10-28 ENCOUNTER — CARE COORDINATION (OUTPATIENT)
Dept: CASE MANAGEMENT | Age: 69
End: 2019-10-28

## 2019-11-13 DIAGNOSIS — E78.5 HYPERLIPIDEMIA: ICD-10-CM

## 2019-11-14 RX ORDER — SIMVASTATIN 40 MG
TABLET ORAL
Qty: 90 TABLET | Refills: 1 | Status: SHIPPED | OUTPATIENT
Start: 2019-11-14 | End: 2019-12-04

## 2019-12-04 ENCOUNTER — OFFICE VISIT (OUTPATIENT)
Dept: CARDIOLOGY | Age: 69
End: 2019-12-04
Payer: MEDICARE

## 2019-12-04 VITALS
DIASTOLIC BLOOD PRESSURE: 70 MMHG | WEIGHT: 230 LBS | HEIGHT: 72 IN | BODY MASS INDEX: 31.15 KG/M2 | HEART RATE: 68 BPM | SYSTOLIC BLOOD PRESSURE: 150 MMHG

## 2019-12-04 DIAGNOSIS — E11.69 TYPE 2 DIABETES MELLITUS WITH OTHER SPECIFIED COMPLICATION, WITHOUT LONG-TERM CURRENT USE OF INSULIN (HCC): Chronic | ICD-10-CM

## 2019-12-04 DIAGNOSIS — E78.2 MIXED HYPERLIPIDEMIA: Chronic | ICD-10-CM

## 2019-12-04 DIAGNOSIS — I50.22 CHRONIC SYSTOLIC (CONGESTIVE) HEART FAILURE (HCC): ICD-10-CM

## 2019-12-04 DIAGNOSIS — F17.211 CIGARETTE NICOTINE DEPENDENCE IN REMISSION: ICD-10-CM

## 2019-12-04 DIAGNOSIS — I25.10 CORONARY ARTERY DISEASE INVOLVING NATIVE CORONARY ARTERY OF NATIVE HEART WITHOUT ANGINA PECTORIS: Primary | Chronic | ICD-10-CM

## 2019-12-04 DIAGNOSIS — I10 ESSENTIAL HYPERTENSION: Chronic | ICD-10-CM

## 2019-12-04 PROCEDURE — 1123F ACP DISCUSS/DSCN MKR DOCD: CPT | Performed by: CLINICAL NURSE SPECIALIST

## 2019-12-04 PROCEDURE — G8417 CALC BMI ABV UP PARAM F/U: HCPCS | Performed by: CLINICAL NURSE SPECIALIST

## 2019-12-04 PROCEDURE — G8427 DOCREV CUR MEDS BY ELIG CLIN: HCPCS | Performed by: CLINICAL NURSE SPECIALIST

## 2019-12-04 PROCEDURE — G8482 FLU IMMUNIZE ORDER/ADMIN: HCPCS | Performed by: CLINICAL NURSE SPECIALIST

## 2019-12-04 PROCEDURE — 3017F COLORECTAL CA SCREEN DOC REV: CPT | Performed by: CLINICAL NURSE SPECIALIST

## 2019-12-04 PROCEDURE — 99214 OFFICE O/P EST MOD 30 MIN: CPT | Performed by: CLINICAL NURSE SPECIALIST

## 2019-12-04 PROCEDURE — 3044F HG A1C LEVEL LT 7.0%: CPT | Performed by: CLINICAL NURSE SPECIALIST

## 2019-12-04 PROCEDURE — 2022F DILAT RTA XM EVC RTNOPTHY: CPT | Performed by: CLINICAL NURSE SPECIALIST

## 2019-12-04 PROCEDURE — G8598 ASA/ANTIPLAT THER USED: HCPCS | Performed by: CLINICAL NURSE SPECIALIST

## 2019-12-04 PROCEDURE — 4040F PNEUMOC VAC/ADMIN/RCVD: CPT | Performed by: CLINICAL NURSE SPECIALIST

## 2019-12-04 PROCEDURE — 1036F TOBACCO NON-USER: CPT | Performed by: CLINICAL NURSE SPECIALIST

## 2019-12-04 RX ORDER — SPIRONOLACTONE 25 MG/1
25 TABLET ORAL DAILY
COMMUNITY
End: 2020-04-27 | Stop reason: SDUPTHER

## 2019-12-04 RX ORDER — RAMIPRIL 10 MG/1
10 CAPSULE ORAL DAILY
Qty: 90 CAPSULE | Refills: 3 | Status: SHIPPED | OUTPATIENT
Start: 2019-12-04 | End: 2020-12-28 | Stop reason: SDUPTHER

## 2019-12-04 RX ORDER — FUROSEMIDE 40 MG/1
40 TABLET ORAL DAILY
COMMUNITY
End: 2020-09-02 | Stop reason: DRUGHIGH

## 2019-12-04 RX ORDER — RAMIPRIL 5 MG/1
5 CAPSULE ORAL DAILY
COMMUNITY
End: 2019-12-04 | Stop reason: DRUGHIGH

## 2019-12-04 RX ORDER — SIMVASTATIN 40 MG
40 TABLET ORAL DAILY
COMMUNITY
End: 2020-05-18

## 2019-12-04 RX ORDER — METOPROLOL TARTRATE 50 MG/1
50 TABLET, FILM COATED ORAL 2 TIMES DAILY
COMMUNITY
End: 2020-03-11

## 2019-12-04 ASSESSMENT — ENCOUNTER SYMPTOMS
FACIAL SWELLING: 0
COUGH: 0
CHEST TIGHTNESS: 0
WHEEZING: 0
NAUSEA: 0
EYE REDNESS: 0
SHORTNESS OF BREATH: 1
VOMITING: 0
ABDOMINAL PAIN: 0

## 2020-02-27 ENCOUNTER — TELEPHONE (OUTPATIENT)
Dept: CARDIOLOGY | Age: 70
End: 2020-02-27

## 2020-03-02 ENCOUNTER — OFFICE VISIT (OUTPATIENT)
Dept: CARDIOLOGY | Age: 70
End: 2020-03-02
Payer: MEDICARE

## 2020-03-02 VITALS
HEART RATE: 53 BPM | SYSTOLIC BLOOD PRESSURE: 136 MMHG | DIASTOLIC BLOOD PRESSURE: 68 MMHG | HEIGHT: 72 IN | WEIGHT: 241 LBS | BODY MASS INDEX: 32.64 KG/M2

## 2020-03-02 PROCEDURE — 99213 OFFICE O/P EST LOW 20 MIN: CPT | Performed by: INTERNAL MEDICINE

## 2020-03-02 PROCEDURE — 93000 ELECTROCARDIOGRAM COMPLETE: CPT | Performed by: INTERNAL MEDICINE

## 2020-03-02 RX ORDER — AMPICILLIN TRIHYDRATE 250 MG
1000 CAPSULE ORAL DAILY
COMMUNITY

## 2020-03-02 RX ORDER — MULTIVITAMIN WITH IRON
250 TABLET ORAL DAILY
COMMUNITY

## 2020-03-02 NOTE — PROGRESS NOTES
43-year-old gentleman with a history of diabetes, prior tobacco abuse/COPD, dyslipidemia, hypertension, and coronary disease returns for routine follow-up. Cardiac history dates back to February 2001 at which time he had a LIMA placed to the LAD and a Y graft placed to the first and second obtuse marginals. More recently, without symptoms and in the wake of an abnormal Lexiscan dual-isotope restudy revealed occlusion of his vein graft with serial proximal/mid circumflex stenoses which were addressed with orbital atherectomy and the placement of 3 stents. He was also shown to have a 60 to 70% stenosis of the ostium of a large dominant right coronary artery. He was asymptomatic before and remains so. He smoked his last cigarette in November. He has no cardiac complaints at this time. On exam he carries 241 pounds in a 6 foot frame. Pressure is 136/68 with a pulse of 53. Somewhat disheveled middle-aged gentleman. EOMs full, sclerae and conjunctiva normal.  Missing dentition. No elevation of central venous pressure at 45 degrees. Carotid upstrokes normal without delay or bruit. Thyroid normal to palpation. Healed midline sternotomy scar. Chest clear to auscultation without rales or prolonged expiratory phase. No skin lesions seen. PMI normal. S1, S2 normal without murmur or kaushal. Normal bowel sounds without palpable mass or bruit. Trace pretibial edema. Normal range of motion with normal gait. Alert, oriented x 3 and cognition normal as reflected by conversation. EKG reveals sinus bradycardia and a right bundle branch block with anterior lateral ST-T wave abnormalities. Assessment/plan:  1. 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 Will probably obtain a repeat Lexiscan dual-isotope in a year to monitor his right coronary distribution.   2.  Hypertension -he advises his pressure is normally well controlled with exception of

## 2020-03-11 RX ORDER — METOPROLOL TARTRATE 50 MG/1
TABLET, FILM COATED ORAL
Qty: 180 TABLET | Refills: 3 | Status: SHIPPED | OUTPATIENT
Start: 2020-03-11 | End: 2021-03-15

## 2020-03-20 DIAGNOSIS — E11.69 TYPE 2 DIABETES MELLITUS WITH OTHER SPECIFIED COMPLICATION, WITHOUT LONG-TERM CURRENT USE OF INSULIN (HCC): Chronic | ICD-10-CM

## 2020-03-20 DIAGNOSIS — E78.2 MIXED HYPERLIPIDEMIA: Chronic | ICD-10-CM

## 2020-03-20 LAB
ALBUMIN SERPL-MCNC: 4.4 G/DL (ref 3.5–5.2)
ALP BLD-CCNC: 56 U/L (ref 40–130)
ALT SERPL-CCNC: 22 U/L (ref 5–41)
AST SERPL-CCNC: 20 U/L (ref 5–40)
BILIRUB SERPL-MCNC: <0.2 MG/DL (ref 0.2–1.2)
BILIRUBIN DIRECT: 0.2 MG/DL (ref 0–0.3)
BILIRUBIN, INDIRECT: 0 MG/DL (ref 0.1–1)
CHOLESTEROL, TOTAL: 153 MG/DL (ref 160–199)
CREATININE URINE: 154.9 MG/DL (ref 4.2–622)
HBA1C MFR BLD: 5.9 % (ref 4–6)
HDLC SERPL-MCNC: 35 MG/DL (ref 55–121)
LDL CHOLESTEROL CALCULATED: 88 MG/DL
MICROALBUMIN UR-MCNC: 2.2 MG/DL (ref 0–19)
MICROALBUMIN/CREAT UR-RTO: 14.2 MG/G
TOTAL PROTEIN: 6.7 G/DL (ref 6.6–8.7)
TRIGL SERPL-MCNC: 151 MG/DL (ref 0–149)

## 2020-03-23 ENCOUNTER — OFFICE VISIT (OUTPATIENT)
Dept: INTERNAL MEDICINE | Age: 70
End: 2020-03-23
Payer: MEDICARE

## 2020-03-23 VITALS
SYSTOLIC BLOOD PRESSURE: 138 MMHG | BODY MASS INDEX: 31.83 KG/M2 | WEIGHT: 235 LBS | HEIGHT: 72 IN | DIASTOLIC BLOOD PRESSURE: 80 MMHG | HEART RATE: 56 BPM | OXYGEN SATURATION: 98 %

## 2020-03-23 PROCEDURE — 99214 OFFICE O/P EST MOD 30 MIN: CPT | Performed by: INTERNAL MEDICINE

## 2020-03-23 ASSESSMENT — PATIENT HEALTH QUESTIONNAIRE - PHQ9
2. FEELING DOWN, DEPRESSED OR HOPELESS: 0
SUM OF ALL RESPONSES TO PHQ QUESTIONS 1-9: 0
1. LITTLE INTEREST OR PLEASURE IN DOING THINGS: 0
SUM OF ALL RESPONSES TO PHQ QUESTIONS 1-9: 0
SUM OF ALL RESPONSES TO PHQ9 QUESTIONS 1 & 2: 0

## 2020-03-23 ASSESSMENT — ENCOUNTER SYMPTOMS
SHORTNESS OF BREATH: 0
EYE DISCHARGE: 0
ABDOMINAL PAIN: 0
EYE ITCHING: 0
NAUSEA: 0
SORE THROAT: 0
TROUBLE SWALLOWING: 0
SINUS PRESSURE: 0
ABDOMINAL DISTENTION: 0
WHEEZING: 0
BLOOD IN STOOL: 0
VOMITING: 0
BACK PAIN: 0

## 2020-03-23 NOTE — PROGRESS NOTES
[facility] INTERNAL MEDICINE  [address] [facility] [address] [place] [address]  Dept: [phone]  Dept Fax: [phone]: [phone]      Visit Date: 3/23/2020    Priya Maurice a 68 y.o. male who presents today for:  Chief Complaint   Patient presents with    Diabetes         HPI:     68years old and in for follow-up on his diabetes high blood pressure and COPD. He is doing okay. He denies any new problems. He had lab work for review.     Past Medical History:   Diagnosis Date    Arthritis     BPH (benign prostatic hyperplasia)     CAD (coronary artery disease)     CABG  ALYSE to LAD, SVG Y-graft to LM 1&2    Cancer ([facility] [address]     SKIN CA ON NOSE    COPD (chronic obstructive pulmonary disease) ([facility] [address]     Diabetes mellitus ([facility] [address]     Hyperlipidemia     PCP manages    Hypertension     LONG TERM ANTICOAGULENT USE     MDRO (multiple drug resistant organisms) resistance     STAPH INFECTION ON PENIS    S/P CABG (coronary artery bypass graft) 2015    Tobacco abuse       Past Surgical History:   Procedure Laterality Date    APPENDECTOMY      CARDIAC CATHETERIZATION  01    EF in excesss 60%    CARDIAC CATHETERIZATION  98    EF 45%    CATARACT REMOVAL WITH IMPLANT      CIRCUMCISION      DUE TO STAPH INFECTION    CORONARY ARTERY BYPASS GRAFT  2/27/01    x3, LIMA to LAD,SVG Y graft to 1st & 2nd Obtuse yeimy branches    DIAGNOSTIC CARDIAC CATH LAB PROCEDURE      PTCA      SKIN BIOPSY      SKIN CANCER EXCISION      NOSE       Family History   Problem Relation Age of Onset    Stroke Father        Social History     Tobacco Use    Smoking status: Former Smoker     Packs/day: 1.00     Years: 50.00     Pack years: 50.00     Types: Cigarettes     Last attempt to quit: 2019     Years since quittin.3    Smokeless tobacco: Former User     Types: Chew   Substance Use Topics    Alcohol use: No      Current Outpatient Medications

## 2020-04-01 RX ORDER — PIOGLITAZONEHYDROCHLORIDE 45 MG/1
TABLET ORAL
Qty: 90 TABLET | Refills: 0 | Status: SHIPPED | OUTPATIENT
Start: 2020-04-01 | End: 2020-07-14

## 2020-04-27 RX ORDER — SPIRONOLACTONE 25 MG/1
25 TABLET ORAL DAILY
Qty: 90 TABLET | Refills: 3 | Status: SHIPPED | OUTPATIENT
Start: 2020-04-27 | End: 2021-05-17

## 2020-05-04 NOTE — TELEPHONE ENCOUNTER
Last Appointment Date: 3/23/2020  Next Appointment Date: 9/24/2020    No Known Allergies    Patient needs refill on   Requested Prescriptions     Pending Prescriptions Disp Refills    blood glucose test strips (ASCENSIA AUTODISC VI;ONE TOUCH ULTRA TEST VI) strip [Pharmacy Med Name: Accu-Chek Gloria Plus In Vitro Strip] 100 each 0     Sig: USE 1 STRIP TO CHECK GLUCOSE TWICE DAILY AS NEEDED

## 2020-05-18 RX ORDER — SIMVASTATIN 40 MG
TABLET ORAL
Qty: 90 TABLET | Refills: 0 | Status: SHIPPED | OUTPATIENT
Start: 2020-05-18 | End: 2020-08-18

## 2020-05-18 RX ORDER — SYRINGE AND NEEDLE,INSULIN,1ML 31GX15/64"
SYRINGE, EMPTY DISPOSABLE MISCELLANEOUS
Qty: 200 EACH | Refills: 0 | Status: SHIPPED | OUTPATIENT
Start: 2020-05-18 | End: 2020-08-28

## 2020-07-14 RX ORDER — PIOGLITAZONEHYDROCHLORIDE 45 MG/1
TABLET ORAL
Qty: 90 TABLET | Refills: 0 | Status: SHIPPED | OUTPATIENT
Start: 2020-07-14 | End: 2020-10-13

## 2020-08-18 DIAGNOSIS — N40.1 BPH WITH URINARY OBSTRUCTION: ICD-10-CM

## 2020-08-18 DIAGNOSIS — N13.8 BPH WITH URINARY OBSTRUCTION: ICD-10-CM

## 2020-08-18 LAB — PSA SERPL-MCNC: 1.42 NG/ML (ref 0–4)

## 2020-08-18 RX ORDER — SIMVASTATIN 40 MG
TABLET ORAL
Qty: 90 TABLET | Refills: 0 | Status: SHIPPED | OUTPATIENT
Start: 2020-08-18 | End: 2020-11-12

## 2020-08-20 NOTE — PROGRESS NOTES
Subjective    Mr. Montiel is 70 y.o. male    Chief Complaint: BPH    History of Present Illness  70-year-old male established patient formally seen by Dr. Holt annual follow-up for lower urinary tract symptoms.  Severity stable.  Quality painless.  Context he was previously on Ditropan XL but ran out of this medication.  Symptoms are currently mostly urgency and frequency with decreased force of stream.  He denies gross hematuria or flank pain.  Timing PSA drawn 8/18/2020 normal at 1.42.    I spent time today reviewing and summarizing old records last urology clinic visit with Dr. Holt 4/24/2019.      Lab Results   Component Value Date    PSA 1.420 08/18/2020    PSA 1.28 10/21/2019    PSA 1.970 04/18/2019       The following portions of the patient's history were reviewed and updated as appropriate: allergies, current medications, past family history, past medical history, past social history, past surgical history and problem list.    Review of Systems   Constitutional: Negative for chills and fever.   Gastrointestinal: Negative for abdominal pain, anal bleeding and blood in stool.   Genitourinary: Positive for frequency. Negative for dysuria, hematuria and urgency.   All other systems reviewed and are negative.        Current Outpatient Medications:   •  aspirin 325 MG tablet, Take  by mouth., Disp: , Rfl:   •  furosemide (LASIX) 40 MG tablet, Take  by mouth., Disp: , Rfl:   •  glipiZIDE (GLUCOTROL) 10 MG 24 hr tablet, Take  by mouth., Disp: , Rfl:   •  glucose blood test strip, As needed.  PT IS INSULIN DEPT AND CAN CK TWICE DAILY IF NEEDED   DX E11.9, Disp: , Rfl:   •  Insulin Glargine (LANTUS SOLOSTAR) 100 UNIT/ML injection pen, Inject  under the skin., Disp: , Rfl:   •  insulin NPH (humuLIN N,novoLIN N) 100 UNIT/ML injection, 20 UNITS IN AM AND 10 UNITS IN PM, Disp: , Rfl:   •  Insulin Pen Needle 32G X 4 MM misc, , Disp: , Rfl:   •  Insulin Syringes, Disposable, U-100 1 ML misc, , Disp: , Rfl:   •   "metFORMIN (GLUCOPHAGE) 1000 MG tablet, Take  by mouth., Disp: , Rfl:   •  metoprolol tartrate (LOPRESSOR) 50 MG tablet, Take  by mouth., Disp: , Rfl:   •  nitroglycerin (NITROSTAT) 0.4 MG SL tablet, Place  under the tongue Every 5 (Five) Minutes., Disp: , Rfl:   •  oxybutynin XL (DITROPAN XL) 15 MG 24 hr tablet, TAKE 1 TABLET BY MOUTH ONCE DAILY, Disp: 90 tablet, Rfl: 3  •  pioglitazone (ACTOS) 45 MG tablet, Take  by mouth., Disp: , Rfl:   •  ramipril (ALTACE) 5 MG capsule, Take  by mouth., Disp: , Rfl:   •  simvastatin (ZOCOR) 40 MG tablet, Take  by mouth., Disp: , Rfl:   •  spironolactone (ALDACTONE) 25 MG tablet, Take  by mouth., Disp: , Rfl:   •  therapeutic multivitamin-minerals (THERAGRAN-M) tablet, Take  by mouth., Disp: , Rfl:   •  vitamin E 400 UNIT capsule, Take  by mouth., Disp: , Rfl:   •  tamsulosin (FLOMAX) 0.4 MG capsule 24 hr capsule, Take 1 capsule by mouth Every Night., Disp: 90 capsule, Rfl: 3    Past Medical History:   Diagnosis Date   • Cancer (CMS/HCC)     spot on nose   • Diabetes mellitus (CMS/HCC)    • Heart problem    • Hyperlipidemia    • Hypertension        Past Surgical History:   Procedure Laterality Date   • APPENDECTOMY     • CORONARY ARTERY BYPASS GRAFT     • NOSE SURGERY      cancer removed   • PENIS SURGERY      staph infection       Social History     Socioeconomic History   • Marital status: Unknown     Spouse name: Not on file   • Number of children: Not on file   • Years of education: Not on file   • Highest education level: Not on file   Tobacco Use   • Smoking status: Former Smoker     Packs/day: 1.00     Types: Cigarettes   • Smokeless tobacco: Never Used   • Tobacco comment: QUIT IN NOV OF 2019   Substance and Sexual Activity   • Alcohol use: No       Family History   Problem Relation Age of Onset   • No Known Problems Father    • No Known Problems Mother        Objective    Temp 97.2 °F (36.2 °C)   Ht 182.9 cm (72\")   Wt 109 kg (240 lb 3.2 oz)   BMI 32.58 kg/m² "     Physical Exam  Constitutional: Well nourished, Well developed; No apparent distress.  His vital signs are reviewed  Psychiatric: Appropriate affect; Alert and oriented  Eyes: Unremarkable  Musculoskeletal: Normal gait and station  GI: Abdomen is soft, non-tender  Respiratory: No distress; Unlabored movement; No accessory musculature needed with symmetric movements  Skin: No pallor or diaphoresis  ; Penis and testicles are normal; Prostate 40 mL without nodule      Results for orders placed or performed in visit on 08/25/20   POC Urinalysis Dipstick, Multipro   Result Value Ref Range    Color Yellow Yellow, Straw, Dark Yellow, Selina    Clarity, UA Clear Clear    Glucose, UA Negative Negative, 1000 mg/dL (3+) mg/dL    Bilirubin Negative Negative    Ketones, UA Negative Negative    Specific Gravity  1.010 1.005 - 1.030    Blood, UA Negative Negative    pH, Urine 6.0 5.0 - 8.0    Protein, POC Negative Negative mg/dL    Urobilinogen, UA Normal Normal    Nitrite, UA Negative Negative    Leukocytes Negative Negative    International Prostate Symptom Score  The following is posted based on patient questionnaire answers:  0 - not at all    1-7 mild symptoms  1- Less than one time in five  8-19 moderate symptoms  2 -Less than half the time  20-35 severe symptoms  3 - About half the time  4 - More than half the time  5 - Almost always     For following sections:  Incomplete Emptying: - How often have you had the sensation  of not emptying your bladder completely after you finished urinating?  3  Frequency: -How often have you had to urinate again less than   two hours after you finished urinating?      5  Intermittency: -How often have you found you stopped and started again  Several times when you urinate?       3  Urgency: -How often do you find it difficult to postpone urination?             4  Weak stream: - How often have you had a weak urinary stream?             3  Straining: - How often have you had to push or  strain to begin  Urination?          3  Sleeping: -How many times did you most typically get up to urinate   From the time you went to bed at night until the time you got up in the   1  Morning          Total `  22    Quality of Life  How would you feel if you had to live with your urinary condition the way   4  It is now, no better, no worse for the rest of your life?    Where: 0=delighted; 1= pleased, 2= mostly satisfied, 3= mixed, 4 = mostly  Dissatisfied, 5= Unhappy, 6 = terrible    Assessment and Plan    Diagnoses and all orders for this visit:    BPH with urinary obstruction  -     POC Urinalysis Dipstick, Multipro    Slowing of urinary stream  -     tamsulosin (FLOMAX) 0.4 MG capsule 24 hr capsule; Take 1 capsule by mouth Every Night.      Normal UA, exam, and PSA.  Given his advanced age and his bothersome LUTS, I recommended trial of alpha blockade in lieu of going back on Ditropan XL.  He will follow-up in 1 year or sooner as needed.  Given his advanced age and normal PSA, I recommended no further routine PSA screening per AUA guidelines to which patient concurs.      This document has been signed by KATIUSKA Concepcion MD on August 27, 2020 13:44

## 2020-08-24 RX ORDER — NITROGLYCERIN 0.4 MG/1
0.4 TABLET SUBLINGUAL EVERY 5 MIN PRN
Qty: 25 TABLET | Refills: 3 | Status: SHIPPED | OUTPATIENT
Start: 2020-08-24 | End: 2021-04-20 | Stop reason: SDUPTHER

## 2020-08-25 ENCOUNTER — OFFICE VISIT (OUTPATIENT)
Dept: UROLOGY | Facility: CLINIC | Age: 70
End: 2020-08-25

## 2020-08-25 VITALS — BODY MASS INDEX: 32.53 KG/M2 | WEIGHT: 240.2 LBS | TEMPERATURE: 97.2 F | HEIGHT: 72 IN

## 2020-08-25 DIAGNOSIS — N40.1 BPH WITH URINARY OBSTRUCTION: Primary | ICD-10-CM

## 2020-08-25 DIAGNOSIS — R39.198 SLOWING OF URINARY STREAM: ICD-10-CM

## 2020-08-25 DIAGNOSIS — N13.8 BPH WITH URINARY OBSTRUCTION: Primary | ICD-10-CM

## 2020-08-25 PROCEDURE — 99214 OFFICE O/P EST MOD 30 MIN: CPT | Performed by: UROLOGY

## 2020-08-25 PROCEDURE — 81001 URINALYSIS AUTO W/SCOPE: CPT | Performed by: UROLOGY

## 2020-08-25 RX ORDER — TAMSULOSIN HYDROCHLORIDE 0.4 MG/1
1 CAPSULE ORAL NIGHTLY
Qty: 90 CAPSULE | Refills: 3 | Status: SHIPPED | OUTPATIENT
Start: 2020-08-25 | End: 2021-01-01 | Stop reason: SDUPTHER

## 2020-08-25 NOTE — PATIENT INSTRUCTIONS

## 2020-08-28 RX ORDER — SYRINGE AND NEEDLE,INSULIN,1ML 31GX15/64"
SYRINGE, EMPTY DISPOSABLE MISCELLANEOUS
Qty: 200 EACH | Refills: 0 | Status: SHIPPED | OUTPATIENT
Start: 2020-08-28 | End: 2020-12-02

## 2020-09-02 ENCOUNTER — OFFICE VISIT (OUTPATIENT)
Dept: CARDIOLOGY | Age: 70
End: 2020-09-02
Payer: MEDICARE

## 2020-09-02 VITALS
SYSTOLIC BLOOD PRESSURE: 120 MMHG | DIASTOLIC BLOOD PRESSURE: 60 MMHG | WEIGHT: 241 LBS | BODY MASS INDEX: 32.64 KG/M2 | HEART RATE: 66 BPM | HEIGHT: 72 IN

## 2020-09-02 PROCEDURE — 99214 OFFICE O/P EST MOD 30 MIN: CPT | Performed by: CLINICAL NURSE SPECIALIST

## 2020-09-02 RX ORDER — FUROSEMIDE 20 MG/1
20 TABLET ORAL DAILY
Qty: 90 TABLET | Refills: 3 | Status: SHIPPED | OUTPATIENT
Start: 2020-09-02 | End: 2021-03-02

## 2020-09-02 RX ORDER — TAMSULOSIN HYDROCHLORIDE 0.4 MG/1
CAPSULE ORAL
COMMUNITY
Start: 2020-08-25

## 2020-09-02 ASSESSMENT — ENCOUNTER SYMPTOMS
FACIAL SWELLING: 0
EYE REDNESS: 0
SHORTNESS OF BREATH: 1
CHEST TIGHTNESS: 0
NAUSEA: 0
VOMITING: 0
COUGH: 0
ABDOMINAL PAIN: 0
WHEEZING: 0

## 2020-09-02 NOTE — PROGRESS NOTES
[facility], [address] [place] [address], [id number]  Phone: [phone]  Fax: [phone]    OFFICE VISIT:  9/2/2020    [address]: 1950    Reason For Visit:  Ping Lima is a 65 y.o. male who is here for Coronary Artery Disease (No cardiac sx today. ) and Hypertension      {No diagnosis found. (Refresh or delete this SmartLink)}      HPI  Patient is here for follow-up with history of CAD and recent stents 10/19. Dr. Caty Mena notes state to consider a PCI to the RCA in the future if patient is symptomatic. Other history includes systolic heart failure (normal EF per heart cath ), hypertension, hyperlipidemia, diabetes. He quit smoking 1 month ago. He denies chest pain, unusual dyspnea, orthopnea, PND, palpitations. He has some chronic lower extremity edema which is not worse than usual.     Patrick Yeager MD is PCP.   Jorge Barriga [facility] the [facility]:    Patient Active Problem List    Diagnosis Date Noted    Cigarette nicotine dependence in remission 12/04/2019    Left ventricular dysfunction 05/24/2018    Hypertrophy of prostate with urinary obstruction 04/20/2017    Frequency of urination 04/20/2017    Nocturia 04/20/2017    Chronic systolic (congestive) heart failure ([address] 01/09/2017    Tobacco abuse 01/09/2017    S/P CABG (coronary artery bypass graft) 08/05/2015    LONG TERM ANTICOAGULENT USE     CAD (coronary artery disease)     Type 2 diabetes mellitus (HCC)     COPD (chronic obstructive pulmonary disease) (HCC)     Mixed hyperlipidemia     Essential hypertension      Past Medical History:   Diagnosis Date    Arthritis     BPH (benign prostatic hyperplasia)     CAD (coronary artery disease)     CABG 2001 ALYSE to LAD, SVG Y-graft to LM 1&2    Cancer ([address]     SKIN CA ON NOSE    COPD (chronic obstructive pulmonary disease) ([address]     Diabetes mellitus ([address]     Hyperlipidemia     PCP manages    Hypertension     LONG TERM ANTICOAGULENT USE     MDRO (multiple drug resistant organisms) resistance     STAPH INFECTION ON PENIS    S/P CABG (coronary artery bypass graft) 2015    Tobacco abuse      Past Surgical History:   Procedure Laterality Date    APPENDECTOMY      CARDIAC CATHETERIZATION  01    EF in excesss 60%    CARDIAC CATHETERIZATION  98    EF 45%    CATARACT REMOVAL WITH IMPLANT      CIRCUMCISION      DUE TO STAPH INFECTION    CORONARY ARTERY BYPASS GRAFT  2/27/01    x3, LIMA to LAD,SVG Y graft to 1st & 2nd Obtuse yeimy branches    DIAGNOSTIC CARDIAC CATH LAB PROCEDURE      PTCA      SKIN BIOPSY      SKIN CANCER EXCISION      NOSE     Family History   Problem Relation Age of Onset    Stroke Father      Social History     Tobacco Use    Smoking status: Former Smoker     Packs/day: 1.00     Years: 50.00     Pack years: 50.00     Types: Cigarettes     Last attempt to quit: 2019     Years since quittin.8    Smokeless tobacco: Former User     Types: Chew   Substance Use Topics    Alcohol use: No      Current Outpatient Medications   Medication Sig Dispense Refill    tamsulosin (FLOMAX) 0.4 MG capsule TAKE 1 CAPSULE BY MOUTH ONCE DAILY AT NIGHT      furosemide (LASIX) 20 MG tablet Take 1 tablet by mouth daily 90 tablet 3    Evolocumab 140 MG/ML SOAJ Inject 140 mg into the skin every 14 days 2 mL 5    RELION INSULIN SYRINGE 31G X 15/64\" 1 ML MISC USE  SYRINGE TWICE DAILY AS DIRECTED 200 each 0    nitroGLYCERIN (NITROSTAT) 0.4 MG SL tablet Place 1 tablet under the tongue every 5 minutes as needed for Chest pain up to max of 3 total doses.  If no relief after 1 dose, call 911. 25 tablet 3    simvastatin (ZOCOR) 40 MG tablet Take 1 tablet by mouth once daily 90 tablet 0    metFORMIN (GLUCOPHAGE) 1000 MG tablet Take 1 tablet by mouth twice daily 180 tablet 0    pioglitazone (ACTOS) 45 MG tablet Take 1 tablet by mouth once daily 90 tablet 0    blood glucose test strips (ASCENSIA AUTODISC VI;ONE TOUCH ULTRA TEST VI) strip USE 1 STRIP TO CHECK GLUCOSE TWICE DAILY AS NEEDED 100 each 0    spironolactone (ALDACTONE) 25 MG tablet Take 1 tablet by mouth daily 90 tablet 3    metoprolol tartrate (LOPRESSOR) 50 MG tablet Take 1 tablet by mouth twice daily 180 tablet 3    magnesium (MAGNESIUM-OXIDE) 250 MG TABS tablet Take 250 mg by mouth daily      Cinnamon 500 MG CAPS Take 1,000 mg by mouth daily      ramipril (ALTACE) 10 MG capsule Take 1 capsule by mouth daily 90 capsule 3    ASPIRIN 81 PO Take 81 mg by mouth daily       clopidogrel (PLAVIX) 75 MG tablet Take 1 tablet by mouth daily 90 tablet 3    insulin NPH (HUMULIN N) 100 UNIT/ML injection vial 20 UNITS IN AM AND 10 UNITS IN PM (Patient taking differently: 30 UNITS IN AM AND 20 UNITS IN PM) 1 vial 3    Lancets MISC PT IS INSULIN DEPT AND CAN CHECK TWICE DAILY WHEN NEEDED   DX E11.8 100 each 3    Insulin Pen Needle 32G X 4 MM MISC 1 each by Does not apply route daily 100 each 3    Multiple Vitamins-Minerals (THERAPEUTIC MULTIVITAMIN-MINERALS) tablet Take 1 tablet by mouth daily Alive mens 50+      vitamin E 400 UNIT capsule Take 400 Units by mouth daily. No current facility-administered medications for this visit. Allergies: Patient has no known allergies. Review of Systems  Review of Systems   Constitutional: Negative for activity change, diaphoresis, fatigue, fever and unexpected weight change. HENT: Negative for facial swelling and nosebleeds. Eyes: Negative for redness and visual disturbance. Respiratory: Positive for shortness of breath (mild). Negative for cough, chest tightness and wheezing. Cardiovascular: Positive for leg swelling. Negative for chest pain and palpitations. Gastrointestinal: Negative for abdominal pain, nausea and vomiting. Endocrine: Negative for cold intolerance and heat intolerance. Genitourinary: Negative for dysuria and hematuria.    Musculoskeletal: Negative for arthralgias and myalgias. Skin: Negative for pallor and rash. Neurological: Negative for dizziness, seizures, syncope, weakness and light-headedness. Hematological: Does not bruise/bleed easily. Psychiatric/Behavioral: Negative for agitation. The patient is not nervous/anxious. Objective  Vital Signs - /60   Pulse 66   Ht 6' (1.829 m)   Wt 241 lb (109.3 kg)   BMI 32.69 kg/m²   Physical Exam  Vitals signs and nursing note reviewed. Constitutional:       General: He is not in acute distress. Appearance: He is well-developed. He is not diaphoretic. HENT:      Head: Normocephalic and atraumatic. Right Ear: Hearing and external ear normal.      Left Ear: Hearing and external ear normal.      Nose: Nose normal.   Eyes:      General:         Right eye: No discharge. Left eye: No discharge. Pupils: Pupils are equal, round, and reactive to light. Neck:      Musculoskeletal: Neck supple. No muscular tenderness. Thyroid: No thyromegaly. Vascular: No carotid bruit or JVD. Trachea: No tracheal deviation. Cardiovascular:      Rate and Rhythm: Normal rate and regular rhythm. Heart sounds: Normal heart sounds. No murmur. No friction rub. No gallop. Comments: No carotid bruit  Pulmonary:      Effort: Pulmonary effort is normal. No respiratory distress. Breath sounds: Normal breath sounds. No wheezing or rales. Abdominal:      Palpations: Abdomen is soft. Tenderness: There is no abdominal tenderness. Musculoskeletal:         General: Swelling present. Comments: Normal gait and station   Skin:     General: Skin is warm and dry. Findings: No rash. Neurological:      General: No focal deficit present. Mental Status: He is alert and oriented to person, place, and time. Cranial Nerves: No cranial nerve deficit.    Psychiatric:         Mood and Affect: Mood normal.         Behavior: Behavior normal. Judgment: Judgment normal.         Data:   Heart Cath 10/19  Conclusions    Left main, triple-vessel disease. Patent but attenuated LIMA to mid LAD with antegrade flow and intermediate   grade left subclavian artery stenosis. Occluded SVG Y graft to OM1 and OM 2. Normal LV ejection fraction. Successful PCI with drug-eluting stent to left main into proximal   circumflex (4.0 x 9 mm resolute and 3.5 x 22 mm resolute integrity stents)   Successful PCI to mid circumflex with orbital atherectomy and drug-eluting   stent (2.5 x 26 mm resolute integrity). Recommendations    Medical management. Smoking cessation. Consideration for PCI to RCA if symptomatic. Lab Results   Component Value Date    CHOL 153 (L) 03/20/2020    TRIG 151 (H) 03/20/2020    HDL 35 (L) 03/20/2020    LDLCALC 88 03/20/2020     Lab Results   Component Value Date    ALT 22 03/20/2020    AST 20 03/20/2020     Assessment:    {No diagnosis found. (Refresh or delete this SmartLink)}  CAD-stable status post recent stents. No angina symptoms. Consideration for PCI to RCA in the future if he becomes symptomatic. Continue aspirin and Plavix without interruption. Add Repatha for cardiovascular benefit    Chronic systolic heart failure- normal ejection fraction per recent heart cath. Patient appears euvolemic on guideline directed medical therapy. We discussed the importance of daily weights and low-sodium diet    Hyperlipidemia-on statin therapy. Recent LDL was 87. Due to recent stents, add Repatha. Will start the process for approval for this medication. Written information provided to patient on this new medication    Hypertension-not well controlled today. Increase ramipril to 10 mg daily    Diabetes-last A1c 5.9. We discussed A1c goal of less than 7%    Nicotine dependence early remission-Instructed patient in smoking cessation rationales, strategies, and available resources x 1 minutes.   He has been smoke-free for 1 month now and is doing well    Stable cardiovascular status. No evidence of overt heart failure,angina or dysrhythmia. Plan    Return in about 6 months (around 3/2/2021) for JULIEN. Decrease Lasix (fursemide) to 20mg daily  Add Repatha for cholesterol 140mg SQ every 2 weeks    Call with any questionsor concerns  Follow up with Arsen Lange MD for non cardiac problems  Report any new problems  Cardiovascular Fitness-Exercise as tolerated. Strive for 15 minutes of exercise most days of the week. Cardiac / HealthyDiet  Continue current medications as directed  Continue plan of treatment  It is always recommended that you bring your medicationsbottles with you to each visit - this is for your safety!        Cherie Ayala, JULIEN

## 2020-09-02 NOTE — PATIENT INSTRUCTIONS
Return in about 6 months (around 3/2/2021) for APRN.    Decrease Lasix (fursemide) to 20mg daily  Add Repatha for cholesterol 140mg SQ every 2 weeks

## 2020-09-09 ENCOUNTER — TELEPHONE (OUTPATIENT)
Dept: CARDIOLOGY | Age: 70
End: 2020-09-09

## 2020-09-09 ASSESSMENT — ENCOUNTER SYMPTOMS
VOMITING: 0
EYE REDNESS: 0
NAUSEA: 0
WHEEZING: 0
ABDOMINAL PAIN: 0
COUGH: 0
FACIAL SWELLING: 0
SHORTNESS OF BREATH: 0
CHEST TIGHTNESS: 0

## 2020-09-09 NOTE — TELEPHONE ENCOUNTER
[facility]-this patient has an approval letter for Repatha under the media tab that was good from December 2019 through June 2020. Somehow the patient states he was never notified that this medication was approved and it was never sent to his pharmacy. He now has a new insurance. Can you please work on approval/coverage for this medication and let me know?   Thank you

## 2020-09-09 NOTE — PROGRESS NOTES
[facility], [address] [place] [address], [id number]  Phone: [phone]  Fax: [phone]    OFFICE VISIT:  9/2/2020    [address]: 1950    Reason For Visit:  [facility] [place] is a 65 y.o. male who is here for Coronary Artery Disease (No cardiac sx today. ) and Hypertension       Diagnosis Orders   1. Coronary artery disease involving native coronary artery of native heart without angina pectoris     2. Chronic systolic (congestive) heart failure ([address]     3. Essential hypertension     4. Mixed hyperlipidemia     5. Myalgia due to statin     6. Type 2 diabetes mellitus with other specified complication, without long-term current use of insulin (HCC)     7. Cigarette nicotine dependence in remission           HPI  Patient is here for follow-up with history of CAD and stents 10/19. Dr. Matti Bull notes state to consider a PCI to the RCA in the future if patient is symptomatic. Other history includes systolic heart failure (normal EF per heart cath ), hypertension, hyperlipidemia, diabetes, history of tobacco abuse (quit 2019). He denies chest pain, unusual dyspnea, orthopnea, PND, palpitations. He has some chronic lower extremity edema which is not worse than usual.     Inge Beth MD is PCP.   Aidee Young has the following history as recorded in [facility]:    Patient Active Problem List    Diagnosis Date Noted    Cigarette nicotine dependence in remission 12/04/2019    Left ventricular dysfunction 05/24/2018    Hypertrophy of prostate with urinary obstruction 04/20/2017    Frequency of urination 04/20/2017    Nocturia 04/20/2017    Chronic systolic (congestive) heart failure ([facility] 01/09/2017    Tobacco abuse 01/09/2017    S/P CABG (coronary artery bypass graft) 08/05/2015    LONG TERM ANTICOAGULENT USE     CAD (coronary artery disease)     Type 2 diabetes mellitus (HCC)     COPD (chronic obstructive pulmonary disease) (HCC)     Mixed hyperlipidemia     Essential hypertension      Past Medical History:   Diagnosis Date    Arthritis     BPH (benign prostatic hyperplasia)     CAD (coronary artery disease)     CABG  ALYSE to LAD, SVG Y-graft to LM 1&2    Cancer ([facility]     SKIN CA ON NOSE    COPD (chronic obstructive pulmonary disease) ([facility]     Diabetes mellitus ([facility]     Hyperlipidemia     PCP manages    Hypertension     LONG TERM ANTICOAGULENT USE     MDRO (multiple drug resistant organisms) resistance     STAPH INFECTION ON PENIS    S/P CABG (coronary artery bypass graft) 2015    Tobacco abuse      Past Surgical History:   Procedure Laterality Date    APPENDECTOMY      CARDIAC CATHETERIZATION  01    EF in excesss 60%    CARDIAC CATHETERIZATION  98    EF 45%    CATARACT REMOVAL WITH IMPLANT      CIRCUMCISION      DUE TO STAPH INFECTION    CORONARY ARTERY BYPASS GRAFT  2/27/01    x3, LIMA to LAD,SVG Y graft to 1st & 2nd Obtuse yeimy branches    DIAGNOSTIC CARDIAC CATH LAB PROCEDURE      PTCA      SKIN BIOPSY      SKIN CANCER EXCISION      NOSE     Family History   Problem Relation Age of Onset    Stroke Father      Social History     Tobacco Use    Smoking status: Former Smoker     Packs/day: 1.00     Years: 50.00     Pack years: 50.00     Types: Cigarettes     Last attempt to quit: 2019     Years since quittin.8    Smokeless tobacco: Former User     Types: Chew   Substance Use Topics    Alcohol use: No      Current Outpatient Medications   Medication Sig Dispense Refill    tamsulosin (FLOMAX) 0.4 MG capsule TAKE 1 CAPSULE BY MOUTH ONCE DAILY AT NIGHT      furosemide (LASIX) 20 MG tablet Take 1 tablet by mouth daily 90 tablet 3    Evolocumab 140 MG/ML SOAJ Inject 140 mg into the skin every 14 days 2 mL 5    RELION INSULIN SYRINGE 31G X 15\" 1 ML MISC USE  SYRINGE TWICE DAILY AS DIRECTED 200 each 0    nitroGLYCERIN (NITROSTAT) 0.4 MG SL tablet Place 1 tablet under the tongue every 5 minutes as needed for Chest pain up to max of 3 total doses. If no relief after 1 dose, call 911. 25 tablet 3    simvastatin (ZOCOR) 40 MG tablet Take 1 tablet by mouth once daily 90 tablet 0    metFORMIN (GLUCOPHAGE) 1000 MG tablet Take 1 tablet by mouth twice daily 180 tablet 0    pioglitazone (ACTOS) 45 MG tablet Take 1 tablet by mouth once daily 90 tablet 0    blood glucose test strips (ASCENSIA AUTODISC VI;ONE TOUCH ULTRA TEST VI) strip USE 1 STRIP TO CHECK GLUCOSE TWICE DAILY AS NEEDED 100 each 0    spironolactone (ALDACTONE) 25 MG tablet Take 1 tablet by mouth daily 90 tablet 3    metoprolol tartrate (LOPRESSOR) 50 MG tablet Take 1 tablet by mouth twice daily 180 tablet 3    magnesium (MAGNESIUM-OXIDE) 250 MG TABS tablet Take 250 mg by mouth daily      Cinnamon 500 MG CAPS Take 1,000 mg by mouth daily      ramipril (ALTACE) 10 MG capsule Take 1 capsule by mouth daily 90 capsule 3    ASPIRIN 81 PO Take 81 mg by mouth daily       clopidogrel (PLAVIX) 75 MG tablet Take 1 tablet by mouth daily 90 tablet 3    insulin NPH (HUMULIN N) 100 UNIT/ML injection vial 20 UNITS IN AM AND 10 UNITS IN PM (Patient taking differently: 30 UNITS IN AM AND 20 UNITS IN PM) 1 vial 3    Lancets MISC PT IS INSULIN DEPT AND CAN CHECK TWICE DAILY WHEN NEEDED   DX E11.8 100 each 3    Insulin Pen Needle 32G X 4 MM MISC 1 each by Does not apply route daily 100 each 3    Multiple Vitamins-Minerals (THERAPEUTIC MULTIVITAMIN-MINERALS) tablet Take 1 tablet by mouth daily Alive mens 50+      vitamin E 400 UNIT capsule Take 400 Units by mouth daily. No current facility-administered medications for this visit. Allergies: Patient has no known allergies. Review of Systems  Review of Systems   Constitutional: Positive for fatigue. Negative for activity change, diaphoresis, fever and unexpected weight change. HENT: Negative for facial swelling and nosebleeds. Eyes: Negative for redness and visual disturbance. Respiratory: Negative for cough, chest tightness, shortness of breath and wheezing. Cardiovascular: Negative for chest pain, palpitations and leg swelling. Gastrointestinal: Negative for abdominal pain, nausea and vomiting. Endocrine: Negative for cold intolerance and heat intolerance. Genitourinary: Negative for dysuria and hematuria. Musculoskeletal: Negative for arthralgias and myalgias. Skin: Negative for pallor and rash. Neurological: Negative for dizziness, seizures, syncope, weakness and light-headedness. Hematological: Does not bruise/bleed easily. Psychiatric/Behavioral: Negative for agitation. The patient is not nervous/anxious. Objective  Vital Signs - /60   Pulse 66   Ht 6' (1.829 m)   Wt 241 lb (109.3 kg)   BMI 32.69 kg/m²   Physical Exam  Vitals signs and nursing note reviewed. Constitutional:       General: He is not in acute distress. Appearance: Normal appearance. He is well-developed. He is not diaphoretic. HENT:      Head: Normocephalic and atraumatic. Right Ear: Hearing and external ear normal.      Left Ear: Hearing and external ear normal.      Nose: Nose normal.   Eyes:      General:         Right eye: No discharge. Left eye: No discharge. Pupils: Pupils are equal, round, and reactive to light. Neck:      Musculoskeletal: Neck supple. No muscular tenderness. Thyroid: No thyromegaly. Vascular: No carotid bruit or JVD. Trachea: No tracheal deviation. Cardiovascular:      Rate and Rhythm: Normal rate and regular rhythm. Heart sounds: Normal heart sounds. No murmur. No friction rub. No gallop. Pulmonary:      Effort: Pulmonary effort is normal. No respiratory distress. Breath sounds: Normal breath sounds. No wheezing or rales. Abdominal:      Palpations: Abdomen is soft. Tenderness: There is no abdominal tenderness. Musculoskeletal:         General: No swelling or deformity.       Comments: Normal gait and station   Skin:     General: Skin is warm and dry. Findings: No rash. Neurological:      General: No focal deficit present. Mental Status: He is alert and oriented to person, place, and time. Cranial Nerves: No cranial nerve deficit. Psychiatric:         Mood and Affect: Mood normal.         Behavior: Behavior normal.         Judgment: Judgment normal.         Data:  Lab Results   Component Value Date    CHOL 153 (L) 03/20/2020    TRIG 151 (H) 03/20/2020    HDL 35 (L) 03/20/2020    LDLCALC 88 03/20/2020     Lab Results   Component Value Date    ALT 22 03/20/2020    AST 20 03/20/2020     Heart Cath 10/19  Conclusions    Left main, triple-vessel disease. Patent but attenuated LIMA to mid LAD with antegrade flow and intermediate   grade left subclavian artery stenosis. Occluded SVG Y graft to OM1 and OM 2. Normal LV ejection fraction. Successful PCI with drug-eluting stent to left main into proximal   circumflex (4.0 x 9 mm resolute and 3.5 x 22 mm resolute integrity stents)   Successful PCI to mid circumflex with orbital atherectomy and drug-eluting   stent (2.5 x 26 mm resolute integrity). Recommendations    Medical management. Smoking cessation. Consideration for PCI to RCA at symptomatic. Assessment:     Diagnosis Orders   1. Coronary artery disease involving native coronary artery of native heart without angina pectoris     2. Chronic systolic (congestive) heart failure ([address]     3. Essential hypertension     4. Mixed hyperlipidemia     5. Myalgia due to statin     6. Type 2 diabetes mellitus with other specified complication, without long-term current use of insulin ([facility])     7. Cigarette nicotine dependence in remission         CAD-Stable-   No angina symptoms. Consideration for PCI to RCA in the future if he becomes symptomatic. Continue aspirin and Plavix without interruption.     We discussed Repatha for cardiovascular benefit at last visit, but patient states he was never told that the medication was approved and he never started it. He states he now has a new insurance. Will work on coverage     Chronic systolic heart failure- normal ejection fraction per recent heart cath. Patient appears euvolemic on guideline directed medical therapy. We discussed the importance of daily weights and low-sodium diet     Hyperlipidemia-on statin therapy. Recent LDL was 88. Due to recent stents, add Repatha. Patient was approved for this medication in December, however he was somehow never notified. He now has a new insurance. Will work on approval with his new insurance. Hypertension-   well controlled today.       Diabetes-last A1c 5.9. We discussed A1c goal of less than 7%     Nicotine dependence early remission-Instructed patient in smoking cessation rationales, strategies, and available resources x 1 minutes. He has been smoke-free saince 2019 and is doing well    Stable cardiovascular status. No evidence of overt heart failure,angina or dysrhythmia. Plan      Return in about 6 months (around 3/2/2021) for APRN. Work on coverage for [employer] with any questionsor concerns  Follow up with Bryn Gilmore MD for non cardiac problems  Report any new problems  Cardiovascular Fitness-Exercise as tolerated. Strive for 15 minutes of exercise most days of the week. Cardiac / HealthyDiet  Continue current medications as directed  Continue plan of treatment  It is always recommended that you bring your medicationsbottles with you to each visit - this is for your safety!        Karissa García APRN

## 2020-09-16 RX ORDER — EVOLOCUMAB 140 MG/ML
1 INJECTION, SOLUTION SUBCUTANEOUS
Qty: 2 PEN | Refills: 11 | Status: SHIPPED | OUTPATIENT
Start: 2020-09-16 | End: 2021-03-02

## 2020-09-16 NOTE — TELEPHONE ENCOUNTER
Per CoverMyMeds in Epic, patient/medication does not require PA. Patient aware, is calling pharmacy to check on status.

## 2020-09-17 DIAGNOSIS — I10 ESSENTIAL HYPERTENSION: Chronic | ICD-10-CM

## 2020-09-17 DIAGNOSIS — E11.69 TYPE 2 DIABETES MELLITUS WITH OTHER SPECIFIED COMPLICATION, WITHOUT LONG-TERM CURRENT USE OF INSULIN (HCC): Chronic | ICD-10-CM

## 2020-09-17 DIAGNOSIS — Z12.5 ENCOUNTER FOR SCREENING FOR MALIGNANT NEOPLASM OF PROSTATE: ICD-10-CM

## 2020-09-17 DIAGNOSIS — E78.2 MIXED HYPERLIPIDEMIA: Chronic | ICD-10-CM

## 2020-09-17 LAB
ALBUMIN SERPL-MCNC: 4.3 G/DL (ref 3.5–5.2)
ALP BLD-CCNC: 49 U/L (ref 40–130)
ALT SERPL-CCNC: 21 U/L (ref 5–41)
ANION GAP SERPL CALCULATED.3IONS-SCNC: 15 MMOL/L (ref 7–19)
AST SERPL-CCNC: 21 U/L (ref 5–40)
BASOPHILS ABSOLUTE: 0 K/UL (ref 0–0.2)
BASOPHILS RELATIVE PERCENT: 0.9 % (ref 0–1)
BILIRUB SERPL-MCNC: 0.4 MG/DL (ref 0.2–1.2)
BUN BLDV-MCNC: 23 MG/DL (ref 8–23)
CALCIUM SERPL-MCNC: 9.2 MG/DL (ref 8.8–10.2)
CHLORIDE BLD-SCNC: 101 MMOL/L (ref 98–111)
CHOLESTEROL, TOTAL: 161 MG/DL (ref 160–199)
CO2: 24 MMOL/L (ref 22–29)
CREAT SERPL-MCNC: 1 MG/DL (ref 0.5–1.2)
EOSINOPHILS ABSOLUTE: 0.1 K/UL (ref 0–0.6)
EOSINOPHILS RELATIVE PERCENT: 1.8 % (ref 0–5)
GFR AFRICAN AMERICAN: >59
GFR NON-AFRICAN AMERICAN: >60
GLUCOSE BLD-MCNC: 132 MG/DL (ref 74–109)
HBA1C MFR BLD: 6 % (ref 4–6)
HCT VFR BLD CALC: 40.1 % (ref 42–52)
HDLC SERPL-MCNC: 37 MG/DL (ref 55–121)
HEMOGLOBIN: 13 G/DL (ref 14–18)
IMMATURE GRANULOCYTES #: 0 K/UL
LDL CHOLESTEROL CALCULATED: 92 MG/DL
LYMPHOCYTES ABSOLUTE: 1.1 K/UL (ref 1.1–4.5)
LYMPHOCYTES RELATIVE PERCENT: 25.2 % (ref 20–40)
MCH RBC QN AUTO: 31.6 PG (ref 27–31)
MCHC RBC AUTO-ENTMCNC: 32.4 G/DL (ref 33–37)
MCV RBC AUTO: 97.3 FL (ref 80–94)
MONOCYTES ABSOLUTE: 0.6 K/UL (ref 0–0.9)
MONOCYTES RELATIVE PERCENT: 12.8 % (ref 0–10)
NEUTROPHILS ABSOLUTE: 2.7 K/UL (ref 1.5–7.5)
NEUTROPHILS RELATIVE PERCENT: 59.1 % (ref 50–65)
PDW BLD-RTO: 12.7 % (ref 11.5–14.5)
PLATELET # BLD: 199 K/UL (ref 130–400)
PMV BLD AUTO: 9.8 FL (ref 9.4–12.4)
POTASSIUM SERPL-SCNC: 4.9 MMOL/L (ref 3.5–5)
PROSTATE SPECIFIC ANTIGEN: 1.3 NG/ML (ref 0–4)
RBC # BLD: 4.12 M/UL (ref 4.7–6.1)
SODIUM BLD-SCNC: 140 MMOL/L (ref 136–145)
TOTAL PROTEIN: 6.6 G/DL (ref 6.6–8.7)
TRIGL SERPL-MCNC: 162 MG/DL (ref 0–149)
WBC # BLD: 4.5 K/UL (ref 4.8–10.8)

## 2020-09-24 ENCOUNTER — OFFICE VISIT (OUTPATIENT)
Dept: INTERNAL MEDICINE | Age: 70
End: 2020-09-24
Payer: MEDICARE

## 2020-09-24 VITALS
WEIGHT: 240 LBS | BODY MASS INDEX: 32.51 KG/M2 | SYSTOLIC BLOOD PRESSURE: 114 MMHG | HEIGHT: 72 IN | DIASTOLIC BLOOD PRESSURE: 62 MMHG | HEART RATE: 54 BPM

## 2020-09-24 PROCEDURE — G0008 ADMIN INFLUENZA VIRUS VAC: HCPCS | Performed by: INTERNAL MEDICINE

## 2020-09-24 PROCEDURE — 90653 IIV ADJUVANT VACCINE IM: CPT | Performed by: INTERNAL MEDICINE

## 2020-09-24 PROCEDURE — 99214 OFFICE O/P EST MOD 30 MIN: CPT | Performed by: INTERNAL MEDICINE

## 2020-09-24 ASSESSMENT — ENCOUNTER SYMPTOMS
WHEEZING: 0
VOMITING: 0
BLOOD IN STOOL: 0
TROUBLE SWALLOWING: 0
BACK PAIN: 0
ABDOMINAL DISTENTION: 0
EYE ITCHING: 0
ABDOMINAL PAIN: 0
SINUS PRESSURE: 0
EYE DISCHARGE: 0
NAUSEA: 0
SHORTNESS OF BREATH: 0
SORE THROAT: 0

## 2020-09-24 ASSESSMENT — PATIENT HEALTH QUESTIONNAIRE - PHQ9
SUM OF ALL RESPONSES TO PHQ QUESTIONS 1-9: 0
1. LITTLE INTEREST OR PLEASURE IN DOING THINGS: 0
SUM OF ALL RESPONSES TO PHQ9 QUESTIONS 1 & 2: 0
2. FEELING DOWN, DEPRESSED OR HOPELESS: 0
SUM OF ALL RESPONSES TO PHQ QUESTIONS 1-9: 0

## 2020-09-24 NOTE — PROGRESS NOTES
Outpatient Medications   Medication Sig Dispense Refill    Evolocumab (REPATHA SURECLICK) 299 MG/ML SOAJ Inject 1 mL into the skin every 14 days 2 pen 11    tamsulosin (FLOMAX) 0.4 MG capsule TAKE 1 CAPSULE BY MOUTH ONCE DAILY AT NIGHT      furosemide (LASIX) 20 MG tablet Take 1 tablet by mouth daily 90 tablet 3    RELION INSULIN SYRINGE 31G X 15/64\" 1 ML MISC USE  SYRINGE TWICE DAILY AS DIRECTED 200 each 0    nitroGLYCERIN (NITROSTAT) 0.4 MG SL tablet Place 1 tablet under the tongue every 5 minutes as needed for Chest pain up to max of 3 total doses.  If no relief after 1 dose, call 911. 25 tablet 3    simvastatin (ZOCOR) 40 MG tablet Take 1 tablet by mouth once daily 90 tablet 0    metFORMIN (GLUCOPHAGE) 1000 MG tablet Take 1 tablet by mouth twice daily 180 tablet 0    pioglitazone (ACTOS) 45 MG tablet Take 1 tablet by mouth once daily 90 tablet 0    blood glucose test strips (ASCENSIA AUTODISC VI;ONE TOUCH ULTRA TEST VI) strip USE 1 STRIP TO CHECK GLUCOSE TWICE DAILY AS NEEDED 100 each 0    spironolactone (ALDACTONE) 25 MG tablet Take 1 tablet by mouth daily 90 tablet 3    metoprolol tartrate (LOPRESSOR) 50 MG tablet Take 1 tablet by mouth twice daily 180 tablet 3    magnesium (MAGNESIUM-OXIDE) 250 MG TABS tablet Take 250 mg by mouth daily      Cinnamon 500 MG CAPS Take 1,000 mg by mouth daily      ramipril (ALTACE) 10 MG capsule Take 1 capsule by mouth daily 90 capsule 3    ASPIRIN 81 PO Take 81 mg by mouth daily       clopidogrel (PLAVIX) 75 MG tablet Take 1 tablet by mouth daily 90 tablet 3    insulin NPH (HUMULIN N) 100 UNIT/ML injection vial 20 UNITS IN AM AND 10 UNITS IN PM (Patient taking differently: 30 UNITS IN AM AND 20 UNITS IN PM) 1 vial 3    Lancets MISC PT IS INSULIN DEPT AND CAN CHECK TWICE DAILY WHEN NEEDED   DX E11.8 100 each 3    Insulin Pen Needle 32G X 4 MM MISC 1 each by Does not apply route daily 100 each 3    Multiple Vitamins-Minerals (THERAPEUTIC MULTIVITAMIN-MINERALS) tablet Take 1 tablet by mouth daily Alive mens 50+      vitamin E 400 UNIT capsule Take 400 Units by mouth daily. No current facility-administered medications for this visit. No Known Allergies      Subjective:     Review of Systems   Constitutional: Negative for activity change, appetite change, fatigue and fever. HENT: Negative for congestion, hearing loss, sinus pressure, sore throat and trouble swallowing. Eyes: Negative for discharge and itching. Respiratory: Negative for shortness of breath and wheezing. Cardiovascular: Negative for chest pain, palpitations and leg swelling. Gastrointestinal: Negative for abdominal distention, abdominal pain, blood in stool, nausea and vomiting. Endocrine: Negative for cold intolerance, heat intolerance and polydipsia. Genitourinary: Negative for flank pain, frequency, hematuria and urgency. Musculoskeletal: Negative for arthralgias, back pain and joint swelling. Skin: Negative for rash and wound. Allergic/Immunologic: Negative for environmental allergies and food allergies. Neurological: Negative for dizziness, tremors, syncope, weakness, numbness and headaches. Hematological: Negative for adenopathy. Psychiatric/Behavioral: Negative for agitation and hallucinations. The patient is not nervous/anxious. Objective:      /62   Pulse 54   Ht 5' 11.5\" (1.816 m)   Wt 240 lb (108.9 kg)   BMI 33.01 kg/m²    Physical Exam  Constitutional:       General: He is not in acute distress. Appearance: He is well-developed. He is not diaphoretic. HENT:      Head: Normocephalic and atraumatic. Right Ear: External ear normal.      Left Ear: External ear normal.      Nose: Nose normal.      Mouth/Throat:      Pharynx: No oropharyngeal exudate. Eyes:      General: No scleral icterus. Right eye: No discharge. Left eye: No discharge.       Conjunctiva/sclera: Conjunctivae normal.      Pupils: Pupils are equal, round, and reactive to light. Neck:      Musculoskeletal: Normal range of motion and neck supple. Thyroid: No thyromegaly. Vascular: No JVD. Trachea: No tracheal deviation. Cardiovascular:      Rate and Rhythm: Normal rate and regular rhythm. Heart sounds: Normal heart sounds. No murmur. No friction rub. No gallop. Pulmonary:      Effort: Pulmonary effort is normal. No respiratory distress. Breath sounds: Normal breath sounds. No wheezing or rales. Abdominal:      General: Bowel sounds are normal. There is no distension. Palpations: Abdomen is soft. There is no mass. Tenderness: There is no abdominal tenderness. There is no guarding or rebound. Musculoskeletal: Normal range of motion. General: No tenderness or deformity. Lymphadenopathy:      Cervical: No cervical adenopathy. Skin:     General: Skin is warm and dry. Coloration: Skin is not pale. Findings: No erythema or rash. Neurological:      Mental Status: He is alert and oriented to person, place, and time. Cranial Nerves: No cranial nerve deficit. Motor: No abnormal muscle tone. Coordination: Coordination normal.      Deep Tendon Reflexes: Reflexes are normal and symmetric. Reflexes normal.   Psychiatric:         Behavior: Behavior normal.         Thought Content: Thought content normal.         Judgment: Judgment normal.          Assessment:      Diagnosis Orders   1. Essential hypertension  Comprehensive Metabolic Panel    Hemoglobin A1C    Lipid Panel   2. S/P CABG (coronary artery bypass graft)  Comprehensive Metabolic Panel    Hemoglobin A1C    Lipid Panel   3. Type 2 diabetes mellitus with other specified complication, without long-term current use of insulin (HCC)  Comprehensive Metabolic Panel    Hemoglobin A1C    Lipid Panel   4.  Coronary artery disease involving native coronary artery of native heart without angina pectoris  Comprehensive Metabolic Panel    Hemoglobin A1C    Lipid Panel   5. Mixed hyperlipidemia  Comprehensive Metabolic Panel    Hemoglobin A1C    Lipid Panel            Plan:     Essential hypertension under good control. Blood pressure today is 114/62. He is on Lasix metoprolol and spironolactone. He also takes Altace 10 mg daily. He is tolerating them well watches his sodium and he tries to exercise and is going to continue as he is doing. 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 He gets occasional episodes of chest pain that are recent findings of nitroglycerin but it does not happen often. He just saw cardiology. Coronary artery disease as above    Type 2 diabetes mellitus. He stable. His A1c is 6.0. He is watching his diet takes his insulin as directed. Hypercholesterolemia. His LDL is under 100. Total is under 200. Triglycerides under 150. He is tolerating his medication well and was reluctantly started on Repatha and he is now going to come back in 6 months for recheck. He stable. He just saw urology and had a exam and is okay and his PSA is okay and I will see him back in 6 months. Have spent 25 minutes with patient today, 50% of the time is been spent counseling on cardiovascular risk factors, fall risk precautions, and immunizations,      Return in about 6 months (around 3/24/2021) for blood pressure check, liver ,lipid check, diabetes check, LAB 1 WEEK BEFORE APPOINTMENT. Patient given educational materials- see patient instructions. Discussed use, benefit, and side effects of prescribedmedications. All patient questions answered. Pt voiced understanding. Reviewedhealth maintenance. Instructed to continue current medications, diet and exercise. Patient agreed with treatment plan. **This report has been created usingvoice recognition software. It may contain minor errors which are inherent in voicerecognition technology. **    Electronically signed by Robyn Jaquez MD on 9/24/2020 at 10:53 AM

## 2020-10-13 RX ORDER — CLOPIDOGREL BISULFATE 75 MG/1
TABLET ORAL
Qty: 90 TABLET | Refills: 1 | Status: SHIPPED | OUTPATIENT
Start: 2020-10-13 | End: 2021-04-19

## 2020-10-13 RX ORDER — PIOGLITAZONEHYDROCHLORIDE 45 MG/1
TABLET ORAL
Qty: 90 TABLET | Refills: 0 | Status: SHIPPED | OUTPATIENT
Start: 2020-10-13 | End: 2021-01-12

## 2020-10-13 NOTE — TELEPHONE ENCOUNTER
Maggi Hernandez called requesting a refill of the below medication which has been pended for you:     Requested Prescriptions     Pending Prescriptions Disp Refills    pioglitazone (ACTOS) 45 MG tablet [Pharmacy Med Name: Pioglitazone HCl 45 MG Oral Tablet] 90 tablet 0     Sig: Take 1 tablet by mouth once daily       Last Appointment Date: 9/24/2020  Next Appointment Date: 3/25/2021    No Known Allergies

## 2020-10-19 ENCOUNTER — TELEPHONE (OUTPATIENT)
Dept: CARDIOLOGY | Age: 70
End: 2020-10-19

## 2020-10-19 NOTE — TELEPHONE ENCOUNTER
Pt states that since he started the repatha his BS has been 400 and above. Pt states it was never over 100 before. I explained to pt what you said and he states well it is making it high and I don't want to take it. Pls advise.

## 2020-10-19 NOTE — TELEPHONE ENCOUNTER
Pt called stating since he started Repatha it is messing up his blood sugar readings. Pt would like to stop the repatha. Pls advise.

## 2020-10-19 NOTE — TELEPHONE ENCOUNTER
Patient has had stents in recent months. The Jessy Crews will help him with prevention of further blockages. I would recommend he continue it. I do not know why his blood sugar would be elevated, this medication should have nothing to do with it.   Recommend he discuss his diabetes control with his PCP and I would recommend he continue the Jessy Crews

## 2020-10-19 NOTE — TELEPHONE ENCOUNTER
Please let patient know the [place] should not have anything to do with his blood sugar readings. It is probably just a coincidence that they are uncontrolled.   I would encourage him to continue the [place]

## 2020-10-21 ENCOUNTER — TELEPHONE (OUTPATIENT)
Dept: CARDIOLOGY | Age: 70
End: 2020-10-21

## 2020-11-11 NOTE — TELEPHONE ENCOUNTER
Ursula Heads called requesting a refill of the below medication which has been pended for you:     Requested Prescriptions     Pending Prescriptions Disp Refills    simvastatin (ZOCOR) 40 MG tablet [Pharmacy Med Name: Simvastatin 40 MG Oral Tablet] 90 tablet 0     Sig: Take 1 tablet by mouth once daily       Last Appointment Date: 9/24/2020  Next Appointment Date: 3/25/2021    No Known Allergies

## 2020-11-12 RX ORDER — SIMVASTATIN 40 MG
TABLET ORAL
Qty: 90 TABLET | Refills: 0 | Status: SHIPPED | OUTPATIENT
Start: 2020-11-12 | End: 2021-02-15

## 2020-12-02 RX ORDER — SYRINGE AND NEEDLE,INSULIN,1ML 31GX15/64"
SYRINGE, EMPTY DISPOSABLE MISCELLANEOUS
Qty: 200 EACH | Refills: 0 | Status: SHIPPED | OUTPATIENT
Start: 2020-12-02 | End: 2021-03-15

## 2020-12-28 RX ORDER — RAMIPRIL 10 MG/1
10 CAPSULE ORAL DAILY
Qty: 90 CAPSULE | Refills: 3 | Status: SHIPPED | OUTPATIENT
Start: 2020-12-28 | End: 2021-03-02 | Stop reason: DRUGHIGH

## 2021-01-01 ENCOUNTER — TRANSCRIBE ORDERS (OUTPATIENT)
Dept: ADMINISTRATIVE | Facility: HOSPITAL | Age: 71
End: 2021-01-01

## 2021-01-01 ENCOUNTER — OFFICE VISIT (OUTPATIENT)
Dept: UROLOGY | Facility: CLINIC | Age: 71
End: 2021-01-01

## 2021-01-01 ENCOUNTER — TELEPHONE (OUTPATIENT)
Dept: UROLOGY | Facility: CLINIC | Age: 71
End: 2021-01-01

## 2021-01-01 ENCOUNTER — HOSPITAL ENCOUNTER (OUTPATIENT)
Dept: GENERAL RADIOLOGY | Facility: HOSPITAL | Age: 71
Discharge: HOME | End: 2021-02-19
Admitting: PAIN MEDICINE

## 2021-01-01 VITALS — WEIGHT: 228 LBS | BODY MASS INDEX: 31.92 KG/M2 | TEMPERATURE: 96.9 F | HEIGHT: 71 IN

## 2021-01-01 DIAGNOSIS — M54.50 LUMBAR PAIN: ICD-10-CM

## 2021-01-01 DIAGNOSIS — R39.198 SLOWING OF URINARY STREAM: ICD-10-CM

## 2021-01-01 DIAGNOSIS — M54.16 LUMBAR RADICULOPATHY: Primary | ICD-10-CM

## 2021-01-01 DIAGNOSIS — N40.1 BPH WITH URINARY OBSTRUCTION: Primary | ICD-10-CM

## 2021-01-01 DIAGNOSIS — N13.8 BPH WITH URINARY OBSTRUCTION: Primary | ICD-10-CM

## 2021-01-01 DIAGNOSIS — M54.16 LUMBAR RADICULOPATHY: ICD-10-CM

## 2021-01-01 LAB
BILIRUB BLD-MCNC: NEGATIVE MG/DL
CLARITY, POC: CLEAR
COLOR UR: YELLOW
GLUCOSE UR STRIP-MCNC: NEGATIVE MG/DL
KETONES UR QL: NEGATIVE
LEUKOCYTE EST, POC: NEGATIVE
NITRITE UR-MCNC: NEGATIVE MG/ML
PH UR: 5.5 [PH] (ref 5–8)
PROT UR STRIP-MCNC: NEGATIVE MG/DL
RBC # UR STRIP: NEGATIVE /UL
SP GR UR: 1.01 (ref 1–1.03)
UROBILINOGEN UR QL: NORMAL

## 2021-01-01 PROCEDURE — 81003 URINALYSIS AUTO W/O SCOPE: CPT | Performed by: UROLOGY

## 2021-01-01 PROCEDURE — 99213 OFFICE O/P EST LOW 20 MIN: CPT | Performed by: UROLOGY

## 2021-01-01 PROCEDURE — 51798 US URINE CAPACITY MEASURE: CPT | Performed by: UROLOGY

## 2021-01-01 PROCEDURE — 72110 X-RAY EXAM L-2 SPINE 4/>VWS: CPT

## 2021-01-01 RX ORDER — TAMSULOSIN HYDROCHLORIDE 0.4 MG/1
1 CAPSULE ORAL DAILY
Qty: 30 CAPSULE | Refills: 11 | Status: SHIPPED | OUTPATIENT
Start: 2021-01-01 | End: 2021-01-01

## 2021-01-01 RX ORDER — HYDROCODONE BITARTRATE AND ACETAMINOPHEN 10; 325 MG/1; MG/1
10-325 TABLET ORAL
COMMUNITY
Start: 2021-01-01

## 2021-01-01 RX ORDER — TAMSULOSIN HYDROCHLORIDE 0.4 MG/1
CAPSULE ORAL
Qty: 90 CAPSULE | Refills: 0 | OUTPATIENT
Start: 2021-01-01

## 2021-01-01 RX ORDER — PREGABALIN 50 MG/1
CAPSULE ORAL
COMMUNITY
Start: 2021-01-01 | End: 2021-01-01

## 2021-01-01 RX ORDER — CLOPIDOGREL BISULFATE 75 MG/1
75 TABLET ORAL DAILY
COMMUNITY
Start: 2021-01-01

## 2021-01-01 RX ORDER — TAMSULOSIN HYDROCHLORIDE 0.4 MG/1
1 CAPSULE ORAL NIGHTLY
Qty: 90 CAPSULE | Refills: 3 | Status: SHIPPED | OUTPATIENT
Start: 2021-01-01

## 2021-01-01 RX ORDER — TAMSULOSIN HYDROCHLORIDE 0.4 MG/1
1 CAPSULE ORAL NIGHTLY
Qty: 30 CAPSULE | Refills: 0 | Status: SHIPPED | OUTPATIENT
Start: 2021-01-01 | End: 2021-01-01

## 2021-01-01 RX ORDER — ISOSORBIDE MONONITRATE 30 MG/1
30 TABLET, EXTENDED RELEASE ORAL
COMMUNITY
Start: 2021-01-01

## 2021-01-12 RX ORDER — PIOGLITAZONEHYDROCHLORIDE 45 MG/1
TABLET ORAL
Qty: 90 TABLET | Refills: 0 | Status: SHIPPED | OUTPATIENT
Start: 2021-01-12 | End: 2021-04-08

## 2021-01-17 ENCOUNTER — APPOINTMENT (OUTPATIENT)
Dept: GENERAL RADIOLOGY | Age: 71
DRG: 177 | End: 2021-01-17
Payer: MEDICARE

## 2021-01-17 ENCOUNTER — HOSPITAL ENCOUNTER (INPATIENT)
Age: 71
LOS: 1 days | Discharge: HOME HEALTH | DRG: 177 | End: 2021-01-19
Attending: EMERGENCY MEDICINE
Payer: MEDICARE

## 2021-01-17 DIAGNOSIS — J12.82 PNEUMONIA DUE TO COVID-19 VIRUS: Primary | ICD-10-CM

## 2021-01-17 DIAGNOSIS — R09.02 HYPOXIA: ICD-10-CM

## 2021-01-17 DIAGNOSIS — U07.1 PNEUMONIA DUE TO COVID-19 VIRUS: Primary | ICD-10-CM

## 2021-01-17 PROCEDURE — 96374 THER/PROPH/DIAG INJ IV PUSH: CPT

## 2021-01-17 PROCEDURE — 71045 X-RAY EXAM CHEST 1 VIEW: CPT

## 2021-01-17 PROCEDURE — 99284 EMERGENCY DEPT VISIT MOD MDM: CPT

## 2021-01-17 PROCEDURE — 93005 ELECTROCARDIOGRAM TRACING: CPT | Performed by: EMERGENCY MEDICINE

## 2021-01-17 ASSESSMENT — PAIN SCALES - GENERAL: PAINLEVEL_OUTOF10: 4

## 2021-01-18 PROBLEM — R09.02 HYPOXEMIA: Status: ACTIVE | Noted: 2021-01-18

## 2021-01-18 PROBLEM — Z51.5 PALLIATIVE CARE PATIENT: Status: ACTIVE | Noted: 2021-01-18

## 2021-01-18 PROBLEM — R06.02 SHORTNESS OF BREATH: Status: ACTIVE | Noted: 2021-01-18

## 2021-01-18 PROBLEM — J96.01 ACUTE HYPOXEMIC RESPIRATORY FAILURE DUE TO COVID-19 (HCC): Status: ACTIVE | Noted: 2021-01-18

## 2021-01-18 PROBLEM — U07.1 PNEUMONIA DUE TO COVID-19 VIRUS: Status: ACTIVE | Noted: 2021-01-18

## 2021-01-18 PROBLEM — U07.1 ACUTE HYPOXEMIC RESPIRATORY FAILURE DUE TO COVID-19 (HCC): Status: ACTIVE | Noted: 2021-01-18

## 2021-01-18 PROBLEM — J12.82 PNEUMONIA DUE TO COVID-19 VIRUS: Status: ACTIVE | Noted: 2021-01-18

## 2021-01-18 LAB
ABO/RH: NORMAL
ALBUMIN SERPL-MCNC: 3.7 G/DL (ref 3.5–5.2)
ALP BLD-CCNC: 52 U/L (ref 40–130)
ALT SERPL-CCNC: 29 U/L (ref 5–41)
ANION GAP SERPL CALCULATED.3IONS-SCNC: 10 MMOL/L (ref 7–19)
ANTIBODY SCREEN: NORMAL
AST SERPL-CCNC: 32 U/L (ref 5–40)
BASE EXCESS ARTERIAL: 1.8 MMOL/L (ref -2–2)
BASOPHILS ABSOLUTE: 0 K/UL (ref 0–0.2)
BASOPHILS RELATIVE PERCENT: 0.3 % (ref 0–1)
BILIRUB SERPL-MCNC: 0.4 MG/DL (ref 0.2–1.2)
BUN BLDV-MCNC: 31 MG/DL (ref 8–23)
CALCIUM SERPL-MCNC: 8.6 MG/DL (ref 8.8–10.2)
CARBOXYHEMOGLOBIN ARTERIAL: 2 % (ref 0–5)
CHLORIDE BLD-SCNC: 99 MMOL/L (ref 98–111)
CO2: 25 MMOL/L (ref 22–29)
CREAT SERPL-MCNC: 1.4 MG/DL (ref 0.5–1.2)
EKG P AXIS: 46 DEGREES
EKG P-R INTERVAL: 142 MS
EKG Q-T INTERVAL: 448 MS
EKG QRS DURATION: 146 MS
EKG QTC CALCULATION (BAZETT): 456 MS
EKG T AXIS: 35 DEGREES
EOSINOPHILS ABSOLUTE: 0 K/UL (ref 0–0.6)
EOSINOPHILS RELATIVE PERCENT: 0 % (ref 0–5)
GFR AFRICAN AMERICAN: >59
GFR NON-AFRICAN AMERICAN: 50
GLUCOSE BLD-MCNC: 147 MG/DL (ref 74–109)
GLUCOSE BLD-MCNC: 172 MG/DL (ref 70–99)
GLUCOSE BLD-MCNC: 213 MG/DL (ref 70–99)
GLUCOSE BLD-MCNC: 235 MG/DL (ref 70–99)
GLUCOSE BLD-MCNC: 272 MG/DL (ref 70–99)
HCO3 ARTERIAL: 25.7 MMOL/L (ref 22–26)
HCT VFR BLD CALC: 35.5 % (ref 42–52)
HEMOGLOBIN, ART, EXTENDED: 11 G/DL (ref 14–18)
HEMOGLOBIN: 11.1 G/DL (ref 14–18)
IMMATURE GRANULOCYTES #: 0 K/UL
LYMPHOCYTES ABSOLUTE: 0.4 K/UL (ref 1.1–4.5)
LYMPHOCYTES RELATIVE PERCENT: 12.5 % (ref 20–40)
MCH RBC QN AUTO: 30.6 PG (ref 27–31)
MCHC RBC AUTO-ENTMCNC: 31.3 G/DL (ref 33–37)
MCV RBC AUTO: 97.8 FL (ref 80–94)
METHEMOGLOBIN ARTERIAL: 1.3 %
MONOCYTES ABSOLUTE: 0.3 K/UL (ref 0–0.9)
MONOCYTES RELATIVE PERCENT: 8.1 % (ref 0–10)
NEUTROPHILS ABSOLUTE: 2.5 K/UL (ref 1.5–7.5)
NEUTROPHILS RELATIVE PERCENT: 78.5 % (ref 50–65)
O2 CONTENT ARTERIAL: 13.5 ML/DL
O2 SAT, ARTERIAL: 87 %
O2 THERAPY: ABNORMAL
PCO2 ARTERIAL: 37 MMHG (ref 35–45)
PDW BLD-RTO: 13 % (ref 11.5–14.5)
PERFORMED ON: ABNORMAL
PH ARTERIAL: 7.45 (ref 7.35–7.45)
PLATELET # BLD: 143 K/UL (ref 130–400)
PMV BLD AUTO: 11 FL (ref 9.4–12.4)
PO2 ARTERIAL: 53 MMHG (ref 80–100)
POTASSIUM REFLEX MAGNESIUM: 4.6 MMOL/L (ref 3.5–5)
POTASSIUM, WHOLE BLOOD: 4.6
PRO-BNP: 5210 PG/ML (ref 0–900)
RBC # BLD: 3.63 M/UL (ref 4.7–6.1)
SARS-COV-2, NAAT: DETECTED
SODIUM BLD-SCNC: 134 MMOL/L (ref 136–145)
TOTAL PROTEIN: 5.9 G/DL (ref 6.6–8.7)
TROPONIN: 0.06 NG/ML (ref 0–0.03)
WBC # BLD: 3.2 K/UL (ref 4.8–10.8)

## 2021-01-18 PROCEDURE — 6360000002 HC RX W HCPCS: Performed by: EMERGENCY MEDICINE

## 2021-01-18 PROCEDURE — 6370000000 HC RX 637 (ALT 250 FOR IP): Performed by: EMERGENCY MEDICINE

## 2021-01-18 PROCEDURE — 84132 ASSAY OF SERUM POTASSIUM: CPT

## 2021-01-18 PROCEDURE — 82803 BLOOD GASES ANY COMBINATION: CPT

## 2021-01-18 PROCEDURE — U0002 COVID-19 LAB TEST NON-CDC: HCPCS

## 2021-01-18 PROCEDURE — 80053 COMPREHEN METABOLIC PANEL: CPT

## 2021-01-18 PROCEDURE — 93010 ELECTROCARDIOGRAM REPORT: CPT | Performed by: INTERNAL MEDICINE

## 2021-01-18 PROCEDURE — 2500000003 HC RX 250 WO HCPCS: Performed by: INTERNAL MEDICINE

## 2021-01-18 PROCEDURE — 6370000000 HC RX 637 (ALT 250 FOR IP): Performed by: FAMILY MEDICINE

## 2021-01-18 PROCEDURE — 82947 ASSAY GLUCOSE BLOOD QUANT: CPT

## 2021-01-18 PROCEDURE — 2700000000 HC OXYGEN THERAPY PER DAY

## 2021-01-18 PROCEDURE — 85025 COMPLETE CBC W/AUTO DIFF WBC: CPT

## 2021-01-18 PROCEDURE — 86900 BLOOD TYPING SEROLOGIC ABO: CPT

## 2021-01-18 PROCEDURE — 84484 ASSAY OF TROPONIN QUANT: CPT

## 2021-01-18 PROCEDURE — 86901 BLOOD TYPING SEROLOGIC RH(D): CPT

## 2021-01-18 PROCEDURE — 6370000000 HC RX 637 (ALT 250 FOR IP): Performed by: INTERNAL MEDICINE

## 2021-01-18 PROCEDURE — 1210000000 HC MED SURG R&B

## 2021-01-18 PROCEDURE — 2580000003 HC RX 258: Performed by: INTERNAL MEDICINE

## 2021-01-18 PROCEDURE — 86850 RBC ANTIBODY SCREEN: CPT

## 2021-01-18 PROCEDURE — 83880 ASSAY OF NATRIURETIC PEPTIDE: CPT

## 2021-01-18 PROCEDURE — 36415 COLL VENOUS BLD VENIPUNCTURE: CPT

## 2021-01-18 PROCEDURE — 6360000002 HC RX W HCPCS: Performed by: INTERNAL MEDICINE

## 2021-01-18 RX ORDER — DEXAMETHASONE SODIUM PHOSPHATE 10 MG/ML
6 INJECTION, SOLUTION INTRAMUSCULAR; INTRAVENOUS ONCE
Status: COMPLETED | OUTPATIENT
Start: 2021-01-18 | End: 2021-01-18

## 2021-01-18 RX ORDER — CLOPIDOGREL BISULFATE 75 MG/1
75 TABLET ORAL DAILY
Status: DISCONTINUED | OUTPATIENT
Start: 2021-01-18 | End: 2021-01-19 | Stop reason: HOSPADM

## 2021-01-18 RX ORDER — PIOGLITAZONEHYDROCHLORIDE 45 MG/1
45 TABLET ORAL DAILY
Status: DISCONTINUED | OUTPATIENT
Start: 2021-01-18 | End: 2021-01-19 | Stop reason: HOSPADM

## 2021-01-18 RX ORDER — ALBUTEROL SULFATE 90 UG/1
2 AEROSOL, METERED RESPIRATORY (INHALATION) ONCE
Status: COMPLETED | OUTPATIENT
Start: 2021-01-18 | End: 2021-01-18

## 2021-01-18 RX ORDER — 0.9 % SODIUM CHLORIDE 0.9 %
30 INTRAVENOUS SOLUTION INTRAVENOUS PRN
Status: DISCONTINUED | OUTPATIENT
Start: 2021-01-18 | End: 2021-01-19 | Stop reason: HOSPADM

## 2021-01-18 RX ORDER — DEXAMETHASONE SODIUM PHOSPHATE 10 MG/ML
6 INJECTION, SOLUTION INTRAMUSCULAR; INTRAVENOUS EVERY 24 HOURS
Status: DISCONTINUED | OUTPATIENT
Start: 2021-01-19 | End: 2021-01-18

## 2021-01-18 RX ORDER — NITROGLYCERIN 0.4 MG/1
0.4 TABLET SUBLINGUAL EVERY 5 MIN PRN
Status: DISCONTINUED | OUTPATIENT
Start: 2021-01-18 | End: 2021-01-19 | Stop reason: HOSPADM

## 2021-01-18 RX ORDER — NICOTINE POLACRILEX 4 MG
15 LOZENGE BUCCAL PRN
Status: DISCONTINUED | OUTPATIENT
Start: 2021-01-18 | End: 2021-01-19 | Stop reason: HOSPADM

## 2021-01-18 RX ORDER — METOPROLOL TARTRATE 50 MG/1
50 TABLET, FILM COATED ORAL 2 TIMES DAILY
Status: DISCONTINUED | OUTPATIENT
Start: 2021-01-18 | End: 2021-01-19 | Stop reason: HOSPADM

## 2021-01-18 RX ORDER — DEXTROSE MONOHYDRATE 50 MG/ML
100 INJECTION, SOLUTION INTRAVENOUS PRN
Status: DISCONTINUED | OUTPATIENT
Start: 2021-01-18 | End: 2021-01-19 | Stop reason: HOSPADM

## 2021-01-18 RX ORDER — SODIUM CHLORIDE 9 MG/ML
INJECTION, SOLUTION INTRAVENOUS PRN
Status: DISCONTINUED | OUTPATIENT
Start: 2021-01-18 | End: 2021-01-19 | Stop reason: HOSPADM

## 2021-01-18 RX ORDER — DEXTROSE MONOHYDRATE 25 G/50ML
12.5 INJECTION, SOLUTION INTRAVENOUS PRN
Status: DISCONTINUED | OUTPATIENT
Start: 2021-01-18 | End: 2021-01-19 | Stop reason: HOSPADM

## 2021-01-18 RX ORDER — TAMSULOSIN HYDROCHLORIDE 0.4 MG/1
0.4 CAPSULE ORAL DAILY
Status: DISCONTINUED | OUTPATIENT
Start: 2021-01-18 | End: 2021-01-19 | Stop reason: HOSPADM

## 2021-01-18 RX ORDER — ASPIRIN 81 MG/1
81 TABLET ORAL DAILY
Status: DISCONTINUED | OUTPATIENT
Start: 2021-01-18 | End: 2021-01-19 | Stop reason: HOSPADM

## 2021-01-18 RX ADMIN — INSULIN HUMAN 10 UNITS: 100 INJECTION, SUSPENSION SUBCUTANEOUS at 21:01

## 2021-01-18 RX ADMIN — INSULIN LISPRO 3 UNITS: 100 INJECTION, SOLUTION INTRAVENOUS; SUBCUTANEOUS at 18:01

## 2021-01-18 RX ADMIN — DEXAMETHASONE SODIUM PHOSPHATE 6 MG: 10 INJECTION, SOLUTION INTRAMUSCULAR; INTRAVENOUS at 01:26

## 2021-01-18 RX ADMIN — CLOPIDOGREL BISULFATE 75 MG: 75 TABLET ORAL at 08:43

## 2021-01-18 RX ADMIN — METOPROLOL TARTRATE 50 MG: 50 TABLET, FILM COATED ORAL at 08:43

## 2021-01-18 RX ADMIN — SODIUM CHLORIDE 30 ML: 9 INJECTION, SOLUTION INTRAVENOUS at 08:43

## 2021-01-18 RX ADMIN — INSULIN LISPRO 9 UNITS: 100 INJECTION, SOLUTION INTRAVENOUS; SUBCUTANEOUS at 12:39

## 2021-01-18 RX ADMIN — PIOGLITAZONE 45 MG: 45 TABLET ORAL at 08:43

## 2021-01-18 RX ADMIN — TAMSULOSIN HYDROCHLORIDE 0.4 MG: 0.4 CAPSULE ORAL at 08:43

## 2021-01-18 RX ADMIN — AZITHROMYCIN MONOHYDRATE 500 MG: 500 INJECTION, POWDER, LYOPHILIZED, FOR SOLUTION INTRAVENOUS at 05:19

## 2021-01-18 RX ADMIN — ALBUTEROL SULFATE 2 PUFF: 90 AEROSOL, METERED RESPIRATORY (INHALATION) at 01:26

## 2021-01-18 RX ADMIN — INSULIN LISPRO 2 UNITS: 100 INJECTION, SOLUTION INTRAVENOUS; SUBCUTANEOUS at 21:03

## 2021-01-18 RX ADMIN — SODIUM CHLORIDE, PRESERVATIVE FREE 1 G: 5 INJECTION INTRAVENOUS at 05:19

## 2021-01-18 RX ADMIN — ASPIRIN 81 MG: 81 TABLET, COATED ORAL at 08:43

## 2021-01-18 RX ADMIN — METOPROLOL TARTRATE 50 MG: 50 TABLET, FILM COATED ORAL at 21:01

## 2021-01-18 RX ADMIN — ALBUTEROL SULFATE 2 PUFF: 90 AEROSOL, METERED RESPIRATORY (INHALATION) at 05:20

## 2021-01-18 RX ADMIN — INSULIN HUMAN 20 UNITS: 100 INJECTION, SUSPENSION SUBCUTANEOUS at 08:51

## 2021-01-18 RX ADMIN — REMDESIVIR 200 MG: 100 INJECTION, POWDER, LYOPHILIZED, FOR SOLUTION INTRAVENOUS at 08:42

## 2021-01-18 ASSESSMENT — ENCOUNTER SYMPTOMS
WHEEZING: 1
SHORTNESS OF BREATH: 1
GASTROINTESTINAL NEGATIVE: 1
COUGH: 1
EYES NEGATIVE: 1
COUGH: 0
BACK PAIN: 1

## 2021-01-18 NOTE — PROGRESS NOTES
Palliative Care/Spiritual Care: Spoke with pt to initiate Palliative care. Pt says he was having problems with his legs and couldn't walk. Also says he couldn't walk across his house with getting out of breath. Pt Covid positive. Advance Directives: Pt says he does not have an AD/LW but says his daughter Kelly Davis is his primary decision maker. Pt wants CPR and Ventilator. SEE ACP NOTE. Pain/other symptoms: Pt says he is not having pain. Social/Spiritual: Pt says he doesn't really have a Yazidi but was MARIA LUISA Franco.         Pt/family discussion r/t goals: Pt says his daughter and family live with him and they do the cleaning and cooking. Pt says he drives, ambulates without assistance, and mostly cares for himself. Goal is to return home with previous quality of life, and previous daily living skills. Provided spiritual care with support, and prayer. Pt expressed gratitude for spiritual care.     Electronically signed by Surjit Goldberg on 1/18/2021 at 12:56 PM

## 2021-01-18 NOTE — SIGNIFICANT EVENT
Patient examined remotely on the floor. Feels about the same. Diet ordered. Increased intensity of insulin scale. Will stop antibiotics as no compelling evidence of bacterial superinfection.

## 2021-01-18 NOTE — H&P
HISTORY AND PHYSICAL             Date: 1/18/2021        Patient Name: Royer Muller     YOB: 1950      Age:  65 y.o. Chief Complaint     Chief Complaint   Patient presents with    Shortness of Breath    Extremity Weakness     pt states he has back issues and is having a hard time walking and using BLE      Comes in with weakness and cough and not feeling well. Noted to have covid pneumonia with hypoxemia and has copd and is being admitted for that. History Obtained From   Patient and er doc     History of Present Illness   Came in as he was not feeling like himself. He was having a more difficult time breathing and unable to catch his breath with any type of activity. Past Medical History     Past Medical History:   Diagnosis Date    Arthritis     BPH (benign prostatic hyperplasia)     CAD (coronary artery disease)     CABG 2001 ALYSE to LAD, SVG Y-graft to LM 1&2    Cancer ([facility] [address]     SKIN CA ON NOSE    COPD (chronic obstructive pulmonary disease) ([facility] [address]     Diabetes mellitus ([facility] [address]     Hyperlipidemia     PCP manages    Hypertension     LONG TERM ANTICOAGULENT USE     MDRO (multiple drug resistant organisms) resistance 2003    STAPH INFECTION ON PENIS    S/P CABG (coronary artery bypass graft) 8/5/2015    Tobacco abuse         Past Surgical History     Past Surgical History:   Procedure Laterality Date    APPENDECTOMY      CARDIAC CATHETERIZATION  2/26/01    EF in excesss 60%    CARDIAC CATHETERIZATION  7/22/98    EF 45%    CATARACT REMOVAL WITH IMPLANT  2001    CIRCUMCISION      DUE TO STAPH INFECTION    CORONARY ARTERY BYPASS GRAFT  2/27/01    x3, LIMA to LAD,SVG Y graft to 1st & 2nd Obtuse yeimy branches    DIAGNOSTIC CARDIAC CATH LAB PROCEDURE      PTCA      SKIN BIOPSY      SKIN CANCER EXCISION      NOSE        Medications Prior to Admission     Prior to Admission medications    Medication Sig Start Date End Date Taking?  Authorizing Provider pioglitazone (ACTOS) 45 MG tablet Take 1 tablet by mouth once daily 1/12/21  Yes Anna Espinoza MD   ramipril (ALTACE) 10 MG capsule Take 1 capsule by mouth daily 12/28/20  Yes JULIEN Garcia   simvastatin (ZOCOR) 40 MG tablet Take 1 tablet by mouth once daily 11/12/20  Yes Anna Espinoza MD   metFORMIN (GLUCOPHAGE) 1000 MG tablet Take 1 tablet by mouth twice daily 10/21/20  Yes Anna Espinoza MD   clopidogrel (PLAVIX) 75 MG tablet Take 1 tablet by mouth once daily 10/13/20  Yes JULIEN Ziegler - CNP   tamsulosin (FLOMAX) 0.4 MG capsule TAKE 1 CAPSULE BY MOUTH ONCE DAILY AT NIGHT 8/25/20  Yes Historical Provider, MD   furosemide (LASIX) 20 MG tablet Take 1 tablet by mouth daily 9/2/20  Yes JULIEN Garcia   spironolactone (ALDACTONE) 25 MG tablet Take 1 tablet by mouth daily 4/27/20  Yes JULIEN Garcia   metoprolol tartrate (LOPRESSOR) 50 MG tablet Take 1 tablet by mouth twice daily 3/11/20  Yes JULIEN Patino - CNP   magnesium (MAGNESIUM-OXIDE) 250 MG TABS tablet Take 250 mg by mouth daily   Yes Historical Provider, MD   ASPIRIN 81 PO Take 81 mg by mouth daily    Yes Historical Provider, MD   insulin NPH (HUMULIN N) 100 UNIT/ML injection vial 20 UNITS IN AM AND 10 UNITS IN PM  Patient taking differently: 30 UNITS IN AM AND 20 UNITS IN PM 2/16/18  Yes Anna Espinoza MD   Multiple Vitamins-Minerals (THERAPEUTIC MULTIVITAMIN-MINERALS) tablet Take 1 tablet by mouth daily Alive mens 50+   Yes Historical Provider, MD   vitamin E 400 UNIT capsule Take 400 Units by mouth daily.      Yes Historical Provider, MD   RELION INSULIN SYRINGE 31G X 15/64\" 1 ML MISC USE 1  TWICE DAILY AS DIRECTED 12/2/20   Anna Espinoza MD   Evolocumab (REPATHA SURECLICK) 524 MG/ML SOAJ Inject 1 mL into the skin every 14 days 9/16/20   JULIEN Garcia nitroGLYCERIN (NITROSTAT) 0.4 MG SL tablet Place 1 tablet under the tongue every 5 minutes as needed for Chest pain up to max of 3 total doses. If no relief after 1 dose, call 911. 8/24/20   JULIEN Thomas   blood glucose test strips (ASCENSIA AUTODISC VI;ONE TOUCH ULTRA TEST VI) strip USE 1 STRIP TO CHECK GLUCOSE TWICE DAILY AS NEEDED 6/24/20   Lamonte Duffy MD   Cinnamon 500 MG CAPS Take 1,000 mg by mouth daily    Historical Provider, MD   Lancets MISC PT IS INSULIN DEPT AND CAN CHECK TWICE DAILY WHEN NEEDED   DX E11.8 12/4/17   Lamonte Duffy MD   Insulin Pen Needle 32G X 4 MM MISC 1 each by Does not apply route daily 9/25/17   Lamonte Duffy MD        Allergies   Patient has no known allergies. Social History     Social History     Tobacco History     Smoking Status  Former Smoker Quit date  11/1/2019 Smoking Frequency  1 pack/day for 50 years (48 pk yrs) Smoking Tobacco Type  Cigarettes    Smokeless Tobacco Use  Former User Smokeless Tobacco Type  Chew          Alcohol History     Alcohol Use Status  No          Drug Use     Drug Use Status  No          Sexual Activity     Sexually Active  Not Currently Partners  Female                Family History     Family History   Problem Relation Age of Onset    Stroke Father        Review of Systems   Review of Systems   Constitutional: Positive for activity change and fatigue. HENT: Negative. Eyes: Negative. Respiratory: Positive for cough, shortness of breath and wheezing. Cardiovascular: Positive for palpitations and leg swelling. Gastrointestinal: Negative. Endocrine: Negative. Genitourinary: Negative. Musculoskeletal: Positive for arthralgias, back pain and gait problem. Neurological: Positive for dizziness, tremors and numbness. Hematological: Negative. Psychiatric/Behavioral: Negative. All other systems reviewed and are negative.          Physical Exam BP (!) 146/66   Pulse 56   Temp 97.5 °F (36.4 °C) (Temporal)   Resp 14   Ht 5' 11\" (1.803 m)   Wt 253 lb 9 oz (115 kg)   SpO2 92%   BMI 35.36 kg/m²     Physical Exam  Vitals signs and nursing note reviewed. Constitutional:       Appearance: He is normal weight. HENT:      Head: Normocephalic. Nose: Nose normal.      Mouth/Throat:      Mouth: Mucous membranes are moist.   Neck:      Musculoskeletal: Normal range of motion. Cardiovascular:      Rate and Rhythm: Regular rhythm. Tachycardia present. Pulmonary:      Effort: Pulmonary effort is normal.      Breath sounds: Wheezing present. Musculoskeletal: Normal range of motion. Skin:     General: Skin is warm. Neurological:      General: No focal deficit present.    Psychiatric:         Mood and Affect: Mood normal.            Labs      Recent Results (from the past 24 hour(s))   CBC Auto Differential    Collection Time: 01/17/21 11:26 PM   Result Value Ref Range    WBC 3.2 (L) 4.8 - 10.8 K/uL    RBC 3.63 (L) 4.70 - 6.10 M/uL    Hemoglobin 11.1 (L) 14.0 - 18.0 g/dL    Hematocrit 35.5 (L) 42.0 - 52.0 %    MCV 97.8 (H) 80.0 - 94.0 fL    MCH 30.6 27.0 - 31.0 pg    MCHC 31.3 (L) 33.0 - 37.0 g/dL    RDW 13.0 11.5 - 14.5 %    Platelets 252 970 - 953 K/uL    MPV 11.0 9.4 - 12.4 fL    Neutrophils % 78.5 (H) 50.0 - 65.0 %    Lymphocytes % 12.5 (L) 20.0 - 40.0 %    Monocytes % 8.1 0.0 - 10.0 %    Eosinophils % 0.0 0.0 - 5.0 %    Basophils % 0.3 0.0 - 1.0 %    Neutrophils Absolute 2.5 1.5 - 7.5 K/uL    Immature Granulocytes # 0.0 K/uL    Lymphocytes Absolute 0.4 (L) 1.1 - 4.5 K/uL    Monocytes Absolute 0.30 0.00 - 0.90 K/uL    Eosinophils Absolute 0.00 0.00 - 0.60 K/uL    Basophils Absolute 0.00 0.00 - 0.20 K/uL   Comprehensive Metabolic Panel w/ Reflex to MG    Collection Time: 01/17/21 11:26 PM   Result Value Ref Range    Sodium 134 (L) 136 - 145 mmol/L    Potassium reflex Magnesium 4.6 3.5 - 5.0 mmol/L    Chloride 99 98 - 111 mmol/L CO2 25 22 - 29 mmol/L    Anion Gap 10 7 - 19 mmol/L    Glucose 147 (H) 74 - 109 mg/dL    BUN 31 (H) 8 - 23 mg/dL    CREATININE 1.4 (H) 0.5 - 1.2 mg/dL    GFR Non-African American 50 (A) >60    GFR African American >59 >59    Calcium 8.6 (L) 8.8 - 10.2 mg/dL    Total Protein 5.9 (L) 6.6 - 8.7 g/dL    Alb 3.7 3.5 - 5.2 g/dL    Total Bilirubin 0.4 0.2 - 1.2 mg/dL    Alkaline Phosphatase 52 40 - 130 U/L    ALT 29 5 - 41 U/L    AST 32 5 - 40 U/L   Troponin    Collection Time: 01/17/21 11:26 PM   Result Value Ref Range    Troponin 0.06 (H) 0.00 - 0.03 ng/mL   Brain Natriuretic Peptide    Collection Time: 01/17/21 11:26 PM   Result Value Ref Range    Pro-BNP 5,210 (H) 0 - 900 pg/mL   COVID-19    Collection Time: 01/17/21 11:34 PM   Result Value Ref Range    SARS-CoV-2, NAAT DETECTED (AA) Not Detected   BLOOD GAS, ARTERIAL    Collection Time: 01/18/21  1:30 AM   Result Value Ref Range    pH, Arterial 7.450 7.350 - 7.450    pCO2, Arterial 37.0 35.0 - 45.0 mmHg    pO2, Arterial 53.0 (L) 80.0 - 100.0 mmHg    HCO3, Arterial 25.7 22.0 - 26.0 mmol/L    Base Excess, Arterial 1.8 -2.0 - 2.0 mmol/L    Hemoglobin, Art, Extended 11.0 (L) 14.0 - 18.0 g/dL    O2 Sat, Arterial 87.0 (LL) >92 %    Carboxyhgb, Arterial 2.0 0.0 - 5.0 %    Methemoglobin, Arterial 1.3 <1.5 %    O2 Content, Arterial 13.5 Not Established mL/dL    O2 Therapy Unknown    Potassium, Whole Blood    Collection Time: 01/18/21  1:30 AM   Result Value Ref Range    Potassium, Whole Blood 4.6         Imaging/Diagnostics Last 24 Hours   No results found.     Assessment      Hospital Problems           Last Modified POA    * (Principal) Pneumonia due to COVID-19 virus 1/18/2021 Yes    CAD (coronary artery disease) (Chronic) 1/18/2021 Yes    Overview Signed 7/5/2011  4:35 PM by Asha Culver, RN     CABG 2001 ALYSE to LAD, SVG Y-graft to LM 1&2         Type 2 diabetes mellitus ([facility] [address] (Chronic) 1/18/2021 Yes COPD (chronic obstructive pulmonary disease) (HCC) (Chronic) 1/18/2021 Yes    S/P CABG (coronary artery bypass graft) 1/18/2021 Yes    Chronic systolic (congestive) heart failure ([facility] [address] 1/18/2021 Yes    Overview Signed 10/14/2017  7:32 PM by [facility]     Updating Deprecated Diagnoses         Tobacco abuse (Chronic) 1/18/2021 Yes    Left ventricular dysfunction 1/18/2021 Yes    Overview Signed 5/24/2018  3:20 PM by JULIEN Rudolph     EF 43% 1/2018 echo         Cigarette nicotine dependence in remission 1/18/2021 Yes    Acute hypoxemic respiratory failure due to COVID-19 [facility]) 1/18/2021 Yes    Hypoxemia 1/18/2021 Yes    Shortness of breath 1/18/2021 Yes          Plan   Being admitted due to acute onset of shortness of breath   On evaluation this is multifactorial   Due to copd   Nicotine abuse   chf exacerbation   And also new onset of covid induced respiratory decline. And weakness and hypoxemia needing oxygen supplemental therapy to improve overall status     Admitted with acute on chronic hypoxemic and hypercapnea respiratory failure   Acute decompensation due to covid infection     Admit and treat with steroids and meds. And start abx to cover for community acquired pneumonia sx     chf exacerbation     Diurese when he can tolerate    Mild elevation in troponin follow     Elevated bnp expected and follow serially     Supplemental oxygen   Bronchodilators   Antivirals.    And antibiotics     Supportive care     dvt and gi prophylaxis     Consultations Ordered:  IP CONSULT TO PHARMACY    Electronically signed by Keyla Omalley MD on 1/18/21 at 2:48 AM CST

## 2021-01-18 NOTE — ACP (ADVANCE CARE PLANNING)
Advance Care Planning     Advance Care Planning Activator (Inpatient)  Conversation Note      Date of ACP Conversation: 1/18/2021    Conversation Conducted with: Patient    ACP Activator: Dennis Leslie      [facility] Decision Maker:   Primary Decision Maker: Moris Zuniga - Child - [phone]      Care Preferences    Ventilation: \"If you were in your present state of health and suddenly became very ill and were unable to breathe on your own, what would your preference be about the use of a ventilator (breathing machine) if it were available to you? \"      Would the patient desire the use of ventilator (breathing machine)?: Yes    \"If your health worsens and it becomes clear that your chance of recovery is unlikely, what would your preference be about the use of a ventilator (breathing machine) if it were available to you? \"     Would the patient desire the use of ventilator (breathing machine)?: Yes      Resuscitation  \"CPR works best to restart the heart when there is a sudden event, like a heart attack, in someone who is otherwise healthy. Unfortunately, CPR does not typically restart the heart for people who have serious health conditions or who are very sick. \"    \"In the event your heart stopped as a result of an underlying serious health condition, would you want attempts to be made to restart your heart (answer \"yes\" for attempt to resuscitate) or would you prefer a natural death (answer \"no\" for do not attempt to resuscitate)? \" Yes       [] Yes   [] No   Educated Patient / Columba Craw regarding differences between Advance Directives and portable DNR orders.         Conversation Outcomes:  [x] ACP discussion completed      Electronically signed by Dennis Leslie on 1/18/2021 at 12:59 PM

## 2021-01-18 NOTE — PROGRESS NOTES
Results for Antonette Ray (MRN [id number]) as of 1/18/2021 01:33   Ref.  Range 1/18/2021 01:30   Hemoglobin, Art, Extended Latest Ref Range: 14.0 - 18.0 g/dL 11.0 (L)   pH, Arterial Latest Ref Range: 7.350 - 7.450  7.450   pCO2, Arterial Latest Ref Range: 35.0 - 45.0 mmHg 37.0   pO2, Arterial Latest Ref Range: 80.0 - 100.0 mmHg 53.0 (L)   HCO3, Arterial Latest Ref Range: 22.0 - 26.0 mmol/L 25.7   Base Excess, Arterial Latest Ref Range: -2.0 - 2.0 mmol/L 1.8   O2 Sat, Arterial Latest Ref Range: >92 % 87.0 (LL)   O2 Content, Arterial Latest Ref Range: Not Established mL/dL 13.5   Methemoglobin, Arterial Latest Ref Range: <1.5 % 1.3   Carboxyhgb, Arterial Latest Ref Range: 0.0 - 5.0 % 2.0   Patient on RA  Right Radial  +AT

## 2021-01-18 NOTE — ED PROVIDER NOTES
[facility] [address]  eMERGENCY dEPARTMENT eNCOUnter      Pt Name: Liam Hunter  MRN: [id number]  [place] 1950  Date of evaluation: 1/17/2021  [facility]       Chief Complaint   Patient presents with    Shortness of Breath    Extremity Weakness     pt states he has back issues and is having a hard time walking and using BLE         HISTORY OF PRESENT ILLNESS   (Location/Symptom, Timing/Onset,Context/Setting, Quality, Duration, Modifying Factors, Severity)  Note limiting factors. Liam Hunter is a 65 y.o. male who presents to the emergency department      The history is provided by the patient. Shortness of Breath  Severity:  Moderate  Onset quality:  Sudden  Duration:  1 week  Timing:  Constant  Progression:  Worsening  Chronicity:  New  Relieved by:  Rest (partially)  Worsened by: Activity and exertion  Associated symptoms: no chest pain, no cough and no fever    Associated symptoms comment:  Myalgias  Risk factors comment:  No home O2. CAD, DM, COPD, HTN      NursingNotes were reviewed. REVIEW OF SYSTEMS    (2-9 systems for level 4, 10 or more for level 5)     Review of Systems   Constitutional: Negative for fever. Respiratory: Positive for shortness of breath. Negative for cough. Cardiovascular: Negative for chest pain. All other systems reviewed and are negative. Except as noted above the remainder of the review of systems was reviewed and negative.        PAST MEDICAL HISTORY     Past Medical History:   Diagnosis Date    Arthritis     BPH (benign prostatic hyperplasia)     CAD (coronary artery disease)     CABG 2001 ALYSE to LAD, SVG Y-graft to LM 1&2    Cancer ([address]     SKIN CA ON NOSE    COPD (chronic obstructive pulmonary disease) ([address]     Diabetes mellitus ([address]     Hyperlipidemia     PCP manages    Hypertension     LONG TERM ANTICOAGULENT USE     MDRO (multiple drug resistant organisms) resistance 2003 STAPH INFECTION ON PENIS    S/P CABG (coronary artery bypass graft) 8/5/2015    Tobacco abuse          SURGICALHISTORY       Past Surgical History:   Procedure Laterality Date    APPENDECTOMY      CARDIAC CATHETERIZATION  2/26/01    EF in excesss 60%    CARDIAC CATHETERIZATION  7/22/98    EF 45%    CATARACT REMOVAL WITH IMPLANT  2001    CIRCUMCISION      DUE TO STAPH INFECTION    CORONARY ARTERY BYPASS GRAFT  2/27/01    x3, LIMA to LAD,SVG Y graft to 1st & 2nd Obtuse yeimy branches    DIAGNOSTIC CARDIAC CATH LAB PROCEDURE      PTCA      SKIN BIOPSY      SKIN CANCER EXCISION      NOSE         CURRENT MEDICATIONS       Current Discharge Medication List      CONTINUE these medications which have NOT CHANGED    Details   pioglitazone (ACTOS) 45 MG tablet Take 1 tablet by mouth once daily  Qty: 90 tablet, Refills: 0      ramipril (ALTACE) 10 MG capsule Take 1 capsule by mouth daily  Qty: 90 capsule, Refills: 3      simvastatin (ZOCOR) 40 MG tablet Take 1 tablet by mouth once daily  Qty: 90 tablet, Refills: 0      metFORMIN (GLUCOPHAGE) 1000 MG tablet Take 1 tablet by mouth twice daily  Qty: 180 tablet, Refills: 0      clopidogrel (PLAVIX) 75 MG tablet Take 1 tablet by mouth once daily  Qty: 90 tablet, Refills: 1      tamsulosin (FLOMAX) 0.4 MG capsule TAKE 1 CAPSULE BY MOUTH ONCE DAILY AT NIGHT      furosemide (LASIX) 20 MG tablet Take 1 tablet by mouth daily  Qty: 90 tablet, Refills: 3      spironolactone (ALDACTONE) 25 MG tablet Take 1 tablet by mouth daily  Qty: 90 tablet, Refills: 3      metoprolol tartrate (LOPRESSOR) 50 MG tablet Take 1 tablet by mouth twice daily  Qty: 180 tablet, Refills: 3      magnesium (MAGNESIUM-OXIDE) 250 MG TABS tablet Take 250 mg by mouth daily      ASPIRIN 81 PO Take 81 mg by mouth daily       insulin NPH (HUMULIN N) 100 UNIT/ML injection vial 20 UNITS IN AM AND 10 UNITS IN PM  Qty: 1 vial, Refills: 3 Multiple Vitamins-Minerals (THERAPEUTIC MULTIVITAMIN-MINERALS) tablet Take 1 tablet by mouth daily Alive mens 50+      vitamin E 400 UNIT capsule Take 400 Units by mouth daily. RELION INSULIN SYRINGE 31G X 15/64\" 1 ML MISC USE 1  TWICE DAILY AS DIRECTED  Qty: 200 each, Refills: 0      Evolocumab (REPATHA SURECLICK) 482 MG/ML SOAJ Inject 1 mL into the skin every 14 days  Qty: 2 pen, Refills: 11    Associated Diagnoses: Coronary artery disease involving native coronary artery of native heart without angina pectoris; Mixed hyperlipidemia      nitroGLYCERIN (NITROSTAT) 0.4 MG SL tablet Place 1 tablet under the tongue every 5 minutes as needed for Chest pain up to max of 3 total doses. If no relief after 1 dose, call 911. Qty: 25 tablet, Refills: 3    Comments: Please consider 90 day supplies to promote better adherence  Associated Diagnoses: Coronary artery disease involving native heart without angina pectoris, unspecified vessel or lesion type      blood glucose test strips (ASCENSIA AUTODISC VI;ONE TOUCH ULTRA TEST VI) strip USE 1 STRIP TO CHECK GLUCOSE TWICE DAILY AS NEEDED  Qty: 100 each, Refills: 0    Associated Diagnoses: Type 2 diabetes mellitus without complication, with long-term current use of insulin (Formerly [facility])      Cinnamon 500 MG CAPS Take 1,000 mg by mouth daily      Lancets MISC PT IS INSULIN DEPT AND CAN CHECK TWICE DAILY WHEN NEEDED   DX E11.8  Qty: 100 each, Refills: 3      Insulin Pen Needle 32G X 4 MM MISC 1 each by Does not apply route daily  Qty: 100 each, Refills: 3             ALLERGIES     Patient has no known allergies.     FAMILY HISTORY       Family History   Problem Relation Age of Onset    Stroke Father           SOCIAL HISTORY       Social History     Socioeconomic History    Marital status: Legally      Spouse name: Not on file    Number of children: Not on file    Years of education: Not on file    Highest education level: Not on file   Occupational History  Not on file   Social Needs    Financial resource strain: Not on file    Food insecurity     Worry: Not on file     Inability: Not on file    Transportation needs     Medical: Not on file     Non-medical: Not on file   Tobacco Use    Smoking status: Former Smoker     Packs/day: 1.00     Years: 50.00     Pack years: 50.00     Types: Cigarettes     Quit date: 2019     Years since quittin.2    Smokeless tobacco: Former User     Types: Chew   Substance and Sexual Activity    Alcohol use: No    Drug use: No    Sexual activity: Not Currently     Partners: Female   Lifestyle    Physical activity     Days per week: Not on file     Minutes per session: Not on file    Stress: Not on file   Relationships    Social connections     Talks on phone: Not on file     Gets together: Not on file     Attends Druze service: Not on file     Active member of club or organization: Not on file     Attends meetings of clubs or organizations: Not on file     Relationship status: Not on file    Intimate partner violence     Fear of current or ex partner: Not on file     Emotionally abused: Not on file     Physically abused: Not on file     Forced sexual activity: Not on file   Other Topics Concern    Not on file   Social History Narrative    Not on file       SCREENINGS    Sudha Coma Scale  Eye Opening: Spontaneous  Best Verbal Response: Oriented  Best Motor Response: Obeys commands  Sudha Coma Scale Score: 15 @FLOW([id number])@      PHYSICAL EXAM    (up to 7 for level 4, 8 or more for level 5)     ED Triage Vitals [21 2315]   BP Temp Temp Source Pulse Resp SpO2 Height Weight   (!) 125/44 99.3 °F (37.4 °C) Oral 75 22 (!) 88 % 5' 11\" (1.803 m) 250 lb (113.4 kg)       Physical Exam  Vitals signs and nursing note reviewed. Constitutional:       General: He is in acute distress (mild off O2). Appearance: He is obese. He is ill-appearing.    HENT:      Mouth/Throat:      Mouth: Mucous membranes are moist. Pharynx: Oropharynx is clear. Eyes:      Extraocular Movements: Extraocular movements intact. Pupils: Pupils are equal, round, and reactive to light. Neck:      Musculoskeletal: Normal range of motion and neck supple. Cardiovascular:      Rate and Rhythm: Normal rate. Rhythm irregular. Heart sounds: Normal heart sounds. Pulmonary:      Effort: Respiratory distress (mild) present. Breath sounds: Examination of the right-upper field reveals decreased breath sounds. Examination of the left-upper field reveals decreased breath sounds. Examination of the right-middle field reveals decreased breath sounds. Examination of the left-middle field reveals decreased breath sounds. Examination of the right-lower field reveals decreased breath sounds. Examination of the left-lower field reveals decreased breath sounds. Decreased breath sounds and wheezing (faint bilateral) present. No rales. Musculoskeletal:      Right lower leg: No edema. Left lower leg: No edema. Skin:     General: Skin is warm and dry. Neurological:      General: No focal deficit present. Mental Status: He is alert and oriented to person, place, and time. Psychiatric:         Mood and Affect: Mood normal.         DIAGNOSTIC RESULTS     EKG: All EKG's are interpreted by the Emergency Department Physician who either signs or Co-signsthis chart in the absence of a cardiologist.    EKG: sinus, VR 86, ventricular bigeminy, RBBB.   No change from 3/2/20 except PVCs    RADIOLOGY:   Non-plain filmimages such as CT, Ultrasound and MRI are read by the radiologist. Plain radiographic images are visualized and preliminarily interpreted by the emergency physician with the below findings:        Interpretation per the Radiologist below, if available at the time ofthis note:    XR CHEST PORTABLE    (Results Pending)         ED BEDSIDE ULTRASOUND:   Performed by ED Physician - none    LABS:  Labs Reviewed CBC WITH AUTO DIFFERENTIAL - Abnormal; Notable for the following components:       Result Value    WBC 3.2 (*)     RBC 3.63 (*)     Hemoglobin 11.1 (*)     Hematocrit 35.5 (*)     MCV 97.8 (*)     MCHC 31.3 (*)     Neutrophils % 78.5 (*)     Lymphocytes % 12.5 (*)     Lymphocytes Absolute 0.4 (*)     All other components within normal limits   COMPREHENSIVE METABOLIC PANEL W/ REFLEX TO MG FOR LOW K - Abnormal; Notable for the following components:    Sodium 134 (*)     Glucose 147 (*)     BUN 31 (*)     CREATININE 1.4 (*)     GFR Non-African American 50 (*)     Calcium 8.6 (*)     Total Protein 5.9 (*)     All other components within normal limits   TROPONIN - Abnormal; Notable for the following components:    Troponin 0.06 (*)     All other components within normal limits   BRAIN NATRIURETIC PEPTIDE - Abnormal; Notable for the following components:    Pro-BNP 5,210 (*)     All other components within normal limits   COVID-19 - Abnormal; Notable for the following components:    SARS-CoV-2, NAAT DETECTED (*)     All other components within normal limits    Narrative:     CALL  [facility] tel. ,  Microbiology results called to and read back by Julio Harvey RN/ER,  01/18/2021 00:47, by OPHARRY   BLOOD GAS, ARTERIAL - Abnormal; Notable for the following components:    pO2, Arterial 53.0 (*)     Hemoglobin, Art, Extended 11.0 (*)     O2 Sat, Arterial 87.0 (*)     All other components within normal limits    Narrative:     [address] tel[address] , results given to Dr. Marta Headley MD  results given to Dr. Marta Headley MD, 01/18/2021 01:31, by [facility]   POTASSIUM, WHOLE BLOOD    Narrative:     [address] tel[address] , results given to Dr. Marta Headley MD  results given to Dr. Marta Headley MD, 01/18/2021 01:31, by [facility]   POCT GLUCOSE   POCT GLUCOSE   POCT GLUCOSE   PREPARE COVID-19 CONVALESCENT PLASMA   TYPE AND SCREEN       All other labs were within normal range or not returned as of this dictation. EMERGENCY DEPARTMENT COURSE and DIFFERENTIAL DIAGNOSIS/MDM:   Vitals:    Vitals:    01/17/21 2315 01/18/21 0103 01/18/21 0303 01/18/21 0335   BP: (!) 125/44 (!) 101/44 (!) 130/48 (!) 146/66   Pulse: 75 58 61 56   Resp: 22 16 12 14   Temp: 99.3 °F (37.4 °C)   97.5 °F (36.4 °C)   TempSrc: Oral   Temporal   SpO2: (!) 88% (!) 88% 94% 92%   Weight: 250 lb (113.4 kg)   253 lb 9 oz (115 kg)   Height: 5' 11\" (1.803 m)   5' 11\" (1.803 m)           MDM    CRITICAL CARE TIME   Total Critical Care time was 0 minutes, excluding separately reportable procedures. There was a high probability of clinically significant/lifethreatening deterioration in the patient's condition which required my urgent intervention. CONSULTS:  IP CONSULT TO PHARMACY    PROCEDURES:  Unless otherwise noted below, none     Procedures    FINAL IMPRESSION      1. Pneumonia due to COVID-19 virus    2. Hypoxia          DISPOSITION/PLAN   DISPOSITION        PATIENT REFERRED TO:  No follow-up provider specified.     DISCHARGE MEDICATIONS:  Current Discharge Medication List             (Please note that portions of this note were completed with a voice recognition program.  Efforts were made to edit the dictations but occasionally words are mis-transcribed.)    Dameon Mejias MD (electronically signed)  Attending Emergency Physician          Dameon Mejias MD  01/18/21 3115

## 2021-01-18 NOTE — ED NOTES
Patient placed on cardiac monitor, continuous pulse oximeter, and NIBP monitor. Monitor alarms on.        Andria Johnson RN  01/17/21 9026

## 2021-01-18 NOTE — CARE COORDINATION
Spoke with pt re: dc planning/needs. Pt lives at home with his daughter. He plans to return there at [place]. He is pretty independent at baseline and hopes to return that way. He has a walker in the home to use if needed. He was not on home o2 pta, and would like this supplied by [facility] if he needs it at [place]. [employer] Ols[facility]ew[employer]  Ph: [phone]  Fax: [phone]  He reported that his medications are affordable. He is interested in using [facility] and might be interested in [facility] at [place]. Pt qualifies for the CHW program, SW sent referral.     SW following for PTOT suggestions prior to [place].    Electronically signed by Millicent Landau on 1/18/2021 at 1:11 PM

## 2021-01-19 VITALS
OXYGEN SATURATION: 94 % | TEMPERATURE: 96.5 F | DIASTOLIC BLOOD PRESSURE: 70 MMHG | BODY MASS INDEX: 36.37 KG/M2 | SYSTOLIC BLOOD PRESSURE: 170 MMHG | HEIGHT: 71 IN | HEART RATE: 66 BPM | RESPIRATION RATE: 22 BRPM | WEIGHT: 259.8 LBS

## 2021-01-19 LAB
GLUCOSE BLD-MCNC: 217 MG/DL (ref 70–99)
GLUCOSE BLD-MCNC: 99 MG/DL (ref 70–99)
PERFORMED ON: ABNORMAL
PERFORMED ON: NORMAL

## 2021-01-19 PROCEDURE — 2580000003 HC RX 258: Performed by: INTERNAL MEDICINE

## 2021-01-19 PROCEDURE — 2500000003 HC RX 250 WO HCPCS: Performed by: INTERNAL MEDICINE

## 2021-01-19 PROCEDURE — 2700000000 HC OXYGEN THERAPY PER DAY

## 2021-01-19 PROCEDURE — 97110 THERAPEUTIC EXERCISES: CPT

## 2021-01-19 PROCEDURE — 6370000000 HC RX 637 (ALT 250 FOR IP): Performed by: FAMILY MEDICINE

## 2021-01-19 PROCEDURE — 97162 PT EVAL MOD COMPLEX 30 MIN: CPT

## 2021-01-19 PROCEDURE — 6370000000 HC RX 637 (ALT 250 FOR IP): Performed by: INTERNAL MEDICINE

## 2021-01-19 PROCEDURE — 6360000002 HC RX W HCPCS: Performed by: FAMILY MEDICINE

## 2021-01-19 PROCEDURE — 94761 N-INVAS EAR/PLS OXIMETRY MLT: CPT

## 2021-01-19 PROCEDURE — 82947 ASSAY GLUCOSE BLOOD QUANT: CPT

## 2021-01-19 RX ORDER — PSEUDOEPHEDRINE HCL 30 MG
100 TABLET ORAL 2 TIMES DAILY
Qty: 60 CAPSULE | Refills: 0 | Status: SHIPPED | OUTPATIENT
Start: 2021-01-19 | End: 2021-03-02

## 2021-01-19 RX ORDER — DEXAMETHASONE 6 MG/1
6 TABLET ORAL DAILY
Qty: 9 TABLET | Refills: 0 | Status: SHIPPED | OUTPATIENT
Start: 2021-01-20 | End: 2021-01-29

## 2021-01-19 RX ORDER — DOCUSATE SODIUM 100 MG/1
100 CAPSULE, LIQUID FILLED ORAL 2 TIMES DAILY
Status: DISCONTINUED | OUTPATIENT
Start: 2021-01-19 | End: 2021-01-19 | Stop reason: HOSPADM

## 2021-01-19 RX ADMIN — INSULIN HUMAN 20 UNITS: 100 INJECTION, SUSPENSION SUBCUTANEOUS at 12:06

## 2021-01-19 RX ADMIN — TAMSULOSIN HYDROCHLORIDE 0.4 MG: 0.4 CAPSULE ORAL at 08:26

## 2021-01-19 RX ADMIN — REMDESIVIR 100 MG: 100 INJECTION, POWDER, LYOPHILIZED, FOR SOLUTION INTRAVENOUS at 08:26

## 2021-01-19 RX ADMIN — DEXAMETHASONE 6 MG: 2 TABLET ORAL at 08:26

## 2021-01-19 RX ADMIN — INSULIN LISPRO 6 UNITS: 100 INJECTION, SOLUTION INTRAVENOUS; SUBCUTANEOUS at 12:06

## 2021-01-19 RX ADMIN — BISACODYL 5 MG: 5 TABLET, COATED ORAL at 12:06

## 2021-01-19 RX ADMIN — ASPIRIN 81 MG: 81 TABLET, COATED ORAL at 08:26

## 2021-01-19 RX ADMIN — METOPROLOL TARTRATE 50 MG: 50 TABLET, FILM COATED ORAL at 08:26

## 2021-01-19 RX ADMIN — DOCUSATE SODIUM 100 MG: 100 CAPSULE, LIQUID FILLED ORAL at 12:06

## 2021-01-19 RX ADMIN — CLOPIDOGREL BISULFATE 75 MG: 75 TABLET ORAL at 08:26

## 2021-01-19 RX ADMIN — PIOGLITAZONE 45 MG: 45 TABLET ORAL at 08:26

## 2021-01-19 ASSESSMENT — PAIN SCALES - GENERAL: PAINLEVEL_OUTOF10: 4

## 2021-01-19 ASSESSMENT — PAIN DESCRIPTION - DESCRIPTORS: DESCRIPTORS: DISCOMFORT

## 2021-01-19 ASSESSMENT — PAIN DESCRIPTION - PAIN TYPE: TYPE: ACUTE PAIN

## 2021-01-19 ASSESSMENT — PAIN DESCRIPTION - LOCATION: LOCATION: ABDOMEN

## 2021-01-19 NOTE — DISCHARGE INSTR - ACTIVITY
Preventing Falls: Care Instructions  Your Care Instructions     Getting around your home safely can be a challenge if you have injuries or health problems that make it easy for you to fall. Loose rugs and furniture in walkways are among the dangers for many older people who have problems walking or who have poor eyesight. People who have conditions such as arthritis, osteoporosis, or dementia also have to be careful not to fall. You can make your home safer with a few simple measures. Follow-up care is a key part of your treatment and safety. Be sure to make and go to all appointments, and call your doctor if you are having problems. It's also a good idea to know your test results and keep a list of the medicines you take. How can you care for yourself at home? Taking care of yourself  · You may get dizzy if you do not drink enough water. To prevent dehydration, drink plenty of fluids, enough so that your urine is light yellow or clear like water. Choose water and other caffeine-free clear liquids. If you have kidney, heart, or liver disease and have to limit fluids, talk with your doctor before you increase the amount of fluids you drink. · Exercise regularly to improve your strength, muscle tone, and balance. Walk if you can. Swimming may be a good choice if you cannot walk easily. · Have your vision and hearing checked each year or any time you notice a change. If you have trouble seeing and hearing, you might not be able to avoid objects and could lose your balance. · Know the side effects of the medicines you take. Ask your doctor or pharmacist whether the medicines you take can affect your balance. Sleeping pills or sedatives can affect your balance. · Limit the amount of alcohol you drink. Alcohol can impair your balance and other senses. · Ask your doctor whether calluses or corns on your feet need to be removed. If you wear loose-fitting shoes because of calluses or corns, you can lose your balance and fall. · Talk to your doctor if you have numbness in your feet. Preventing falls at home  · Remove raised doorway thresholds, throw rugs, and clutter. Repair loose carpet or raised areas in the floor. · Move furniture and electrical cords to keep them out of walking paths. · Use nonskid floor wax, and wipe up spills right away, especially on ceramic tile floors. · If you use a walker or cane, put rubber tips on it. If you use crutches, clean the bottoms of them regularly with an abrasive pad, such as steel wool. · Keep your house well lit, especially [place], and outside walkways. Use night-lights in areas such as hallways and bathrooms. Add extra light switches or use remote switches (such as switches that go on or off when you clap your hands) to make it easier to turn lights on if you have to get up during the night. · Install sturdy handrails on stairways. · Move items in your cabinets so that the things you use a lot are on the lower shelves (about waist level). · Keep a cordless phone and a flashlight with new batteries by your bed. If possible, put a phone in each of the main rooms of your house, or carry a cell phone in case you fall and cannot reach a phone. Or, you can wear a device around your neck or wrist. You push a button that sends a signal for help. · Wear low-heeled shoes that fit well and give your feet good support. Use footwear with nonskid soles. Check the heels and soles of your shoes for wear. Repair or replace worn heels or soles. · Do not wear socks without shoes on wood floors. · Walk on the grass when the sidewalks are slippery. If you live in an area that gets snow and ice in the winter, sprinkle salt on slippery steps and sidewalks.   Preventing falls in the bath · Install grab bars and nonskid mats inside and outside your shower or tub and near the toilet and sinks. · Use shower chairs and bath benches. · Use a hand-held shower head that will allow you to sit while showering. · Get into a tub or shower by putting the weaker leg in first. Get out of a tub or shower with your strong side first.  · Repair loose toilet seats and consider installing a raised toilet seat to make getting on and off the toilet easier. · Keep your bathroom door unlocked while you are in the shower. Where can you learn more? Go to https://ClassDojopeSnipi.[employer]. org and sign in to your leaselock account. Enter [phone] in the [facility] box to learn more about \"Preventing Falls: Care Instructions. \"     If you do not have an account, please click on the \"Sign Up Now\" link. Current as of: April 15, 2020               Content Version: 12.6  © 0188-0338 [employer]. Care instructions adapted under license by [facility] ([facility]). If you have questions about a medical condition or this instruction, always ask your healthcare professional. [place] [address] any warranty or liability for your use of this information.

## 2021-01-19 NOTE — DISCHARGE SUMMARY
Maritza Mari  :  1950  MRN:  [id number]    Admit date:  2021  Discharge date:      Admitting Physician:  No admitting provider for patient encounter. Advance Directive: Full Code    Consults: none    Primary Care Physician:  Iveth Wiggins MD    Discharge Diagnoses:  Principal Problem:    Pneumonia due to COVID-19 virus  Active Problems:    CAD (coronary artery disease)    Type 2 diabetes mellitus (HCC)    COPD (chronic obstructive pulmonary disease) (HCC)    S/P CABG (coronary artery bypass graft)    Chronic systolic (congestive) heart failure (HCC)    Tobacco abuse    Left ventricular dysfunction    Cigarette nicotine dependence in remission    Acute hypoxemic respiratory failure due to COVID-19 ([address]    Hypoxemia    Shortness of breath    Palliative care patient  Resolved Problems:    * No resolved hospital problems. *      Significant Diagnostic Studies:   Xr Chest Portable    Result Date: 2021  EXAMINATION:  XR CHEST PORTABLE  2021 7:06 AM HISTORY: Shortness of air, weakness and hypoxia. COMPARISON: 2017. FINDINGS:  There are mild increased interstitial markings. There is hypoventilation. No significant pleural effusion is seen. Heart size is upper limits of normal. There has been prior median sternotomy. 1. Mild increase in interstitial markings may represent mild edema or mild interstitial pneumonitis. 2. Heart size upper limits of normal. Signed by Dr Rajat Ayala on 2021 7:07 AM      Pertinent Labs:   CBC:   Recent Labs     21   WBC 3.2*   HGB 11.1*        BMP:    Recent Labs     21  2326 21  0130   *  --    K 4.6 4.6   CL 99  --    CO2 25  --    BUN 31*  --    CREATININE 1.4*  --    GLUCOSE 147*  --      INR: No results for input(s): INR in the last 72 hours. ABGs:No results for input(s): PH, PO2, PCO2, HCO3, BE, O2SAT in the last 72 hours. Lactic Acid:No results for input(s): LACTA in the last 72 hours. Procedures: None performed. Hospital Course:   1-18: Patient presented to ED overnight with increased shortness of breath for one week. History of COPD. Some sick contacts with COVID-19. No fever, chills, or cough. COVID-19 positive, requiring supplemental oxygen. Admitted to med/surg COVID-19 hernandez on dexamethasone and remdesivir. 1-19: Patient is feeling better, requiring 2 LPM supplemental oxygen only. Will stop remdesivir, which is of questionable risk/benefit with chronic renal disease, and continue dexamethasone x9 days and discharge to home. Physical Exam:  Vitals: BP (!) 170/70   Pulse 66   Temp 96.5 °F (35.8 °C) (Temporal)   Resp 22   Ht 5' 11\" (1.803 m)   Wt 259 lb 12.8 oz (117.8 kg)   SpO2 94%   BMI 36.23 kg/m²   24HR INTAKE/OUTPUT:      Intake/Output Summary (Last 24 hours) at 1/19/2021 1312  Last data filed at 1/19/2021 1030  Gross per 24 hour   Intake 200 ml   Output 1825 ml   Net -1625 ml     Examined remotely to conserve PPE. Auscultatory findings based on nursing examination.   General appearance: alert and cooperative with exam  HEENT: atraumatic, eyes with clear conjunctiva and normal lids, pupils and irises normal, external ears and nose are normal, lips normal. Neck without masses, lympadenopathy, bruit, thyroid normal  Lungs: no increased work of breathing, diminished breath sounds bilaterally  Heart: regular rate and rhythm, S1, S2 normal, no murmur, click, rub or gallop  Abdomen: soft, non-tender; bowel sounds normal; no masses,  no organomegaly  Extremities: extremities normal without clubbing, atraumatic, no cyanosis or edema  Neurologic: no focal neurologic deficits, normal sensation, alert and oriented, affect and mood appropriate  Skin: no jaundice, rashes, or nodules    Discharge Medications:       Rosales Mayers   Home Medication Instructions AMARA:[id number]    Printed on:01/19/21 1312   Medication Information                      apixaban (ELIQUIS) 2.5 MG TABS tablet Take 1 tablet by mouth 2 times daily             ASPIRIN 81 PO  Take 81 mg by mouth daily              blood glucose test strips (ASCENSIA AUTODISC VI;ONE TOUCH ULTRA TEST VI) strip  USE 1 STRIP TO CHECK GLUCOSE TWICE DAILY AS NEEDED             Cinnamon 500 MG CAPS  Take 1,000 mg by mouth daily             clopidogrel (PLAVIX) 75 MG tablet  Take 1 tablet by mouth once daily             dexamethasone (DECADRON) 6 MG tablet  Take 1 tablet by mouth daily for 9 days             docusate sodium (COLACE, DULCOLAX) 100 MG CAPS  Take 100 mg by mouth 2 times daily             Evolocumab (REPATHA SURECLICK) 691 MG/ML SOAJ  Inject 1 mL into the skin every 14 days             furosemide (LASIX) 20 MG tablet  Take 1 tablet by mouth daily             insulin NPH (HUMULIN N) 100 UNIT/ML injection vial  20 UNITS IN AM AND 10 UNITS IN PM             Insulin Pen Needle 32G X 4 MM MISC  1 each by Does not apply route daily             Lancets MISC  PT IS INSULIN DEPT AND CAN CHECK TWICE DAILY WHEN NEEDED   DX E11.8             magnesium (MAGNESIUM-OXIDE) 250 MG TABS tablet  Take 250 mg by mouth daily             metFORMIN (GLUCOPHAGE) 1000 MG tablet  Take 1 tablet by mouth twice daily             metoprolol tartrate (LOPRESSOR) 50 MG tablet  Take 1 tablet by mouth twice daily             Multiple Vitamins-Minerals (THERAPEUTIC MULTIVITAMIN-MINERALS) tablet  Take 1 tablet by mouth daily Alive mens 50+             nitroGLYCERIN (NITROSTAT) 0.4 MG SL tablet  Place 1 tablet under the tongue every 5 minutes as needed for Chest pain up to max of 3 total doses. If no relief after 1 dose, call 911.              pioglitazone (ACTOS) 45 MG tablet  Take 1 tablet by mouth once daily             ramipril (ALTACE) 10 MG capsule  Take 1 capsule by mouth daily             RELION INSULIN SYRINGE 31G X 15/64\" 1 ML MISC  USE 1  TWICE DAILY AS DIRECTED             simvastatin (ZOCOR) 40 MG tablet  Take 1 tablet by mouth once daily spironolactone (ALDACTONE) 25 MG tablet  Take 1 tablet by mouth daily             tamsulosin (FLOMAX) 0.4 MG capsule  TAKE 1 CAPSULE BY MOUTH ONCE DAILY AT NIGHT             vitamin E 400 UNIT capsule  Take 400 Units by mouth daily. Discharge Instructions: Follow up with Sreedhar Epperson MD in 7 days. Take medications as directed. Resume activity as tolerated. Diet: DIET CARB CONTROL; Carb Control: 3 carb choices (45 gms)/meal     Disposition: Patient is medically stable and will be discharged Home. Time spent on discharge less than 30 minutes.     Signed:  Deo Rodriguez DO

## 2021-01-19 NOTE — PROGRESS NOTES
Physical Therapy    Facility/Department: [facility] ONCOLOGY UNIT  Initial Assessment    NAME: Refugio Hirsch  : 1950  MRN: [id number]    Date of Service: 2021    Discharge Recommendations:  Continue to assess pending progress, 24 hour supervision or assist, Home with Home health PT, Patient would benefit from continued therapy after discharge        Assessment   Body structures, Functions, Activity limitations: Decreased functional mobility ; Decreased strength;Decreased endurance;Decreased balance; Increased pain  Assessment: Pt. will benefit from cont PT to decrease impairments. Pt. a fall risk and should not attempt mobility on his own due to weakness, early fatigue and low endurance. Anticipate pt will need cont. PT after d/c from [facility]. Pt. needs to have staff A while amb and use O2 and RW with gait belt. Pt. would benefit from 24 hr A once at home initially if family able to provide that. Treatment Diagnosis: impaired gait and mobility  Prognosis: Fair  Decision Making: Medium Complexity  PT Education: Goals;PT Role;General Safety; Functional Mobility Training;Plan of Care  Patient Education: use of call light for mobility and needs  Barriers to Learning: none noted  REQUIRES PT FOLLOW UP: Yes  Activity Tolerance  Activity Tolerance: Patient limited by endurance; Patient limited by fatigue  Activity Tolerance: increased work of breathing with mobility       Patient Diagnosis(es): The primary encounter diagnosis was Pneumonia due to COVID-19 virus. A diagnosis of Hypoxia was also pertinent to this visit. has a past medical history of Arthritis, BPH (benign prostatic hyperplasia), CAD (coronary artery disease), Cancer ([facility] [address] COPD (chronic obstructive pulmonary disease) ([facility] [address] [facility] Diabetes mellitus ([facility] Hyperlipidemia, Hypertension, LONG TERM ANTICOAGULENT USE, MDRO (multiple drug resistant organisms) resistance, Palliative care patient, S/P CABG (coronary artery bypass graft), and Tobacco abuse. has a past surgical history that includes Appendectomy; Coronary artery bypass graft (2/27/01); Cardiac catheterization (2/26/01); Cardiac catheterization (7/22/98); skin biopsy; Skin cancer excision; Circumcision; Cataract removal with implant (2001); Diagnostic Cardiac Cath Lab Procedure; and Percutaneous Transluminal Coronary Angio. Restrictions  Restrictions/Precautions  Restrictions/Precautions: Fall Risk, Isolation, Contact Precautions  Required Braces or Orthoses?: No  Position Activity Restriction  Other position/activity restrictions: droplet + due to covid 19  Vision/Hearing  Vision: Impaired  Vision Exceptions: Wears glasses at all times  Hearing: Within functional limits     Subjective  General  Chart Reviewed: Yes  Patient assessed for rehabilitation services?: Yes  Response To Previous Treatment: Not applicable  Referring Practitioner: Naif Schuster DO  Referral Date : 01/18/21  Diagnosis: PNA due to covid 19, hypoxia  Follows Commands: Within Functional Limits  General Comment  Comments: RNKevin PT. Subjective  Subjective: Pt. willing to work with therapy. States he feels like he needs to go to the bathroom, but then reports that he is unable to go. Asking if the nurse can get him something to help with a BM.   Pain Screening  Patient Currently in Pain: Yes  Pain Assessment  Pain Assessment: 0-10  Pain Level: 4  Pain Type: Acute pain  Pain Location: Abdomen  Pain Descriptors: Discomfort  Functional Pain Assessment: Prevents or interferes some active activities and ADLs  Non-Pharmaceutical Pain Intervention(s): Ambulation/Increased Activity;Repositioned  Response to Pain Intervention: Patient Satisfied  Vital Signs  Patient Currently in Pain: Yes  Oxygen Therapy  O2 Device: Nasal cannula  O2 Flow Rate (L/min): 2 L/min  Pre Treatment Pain Screening  Intervention List: Patient able to continue with treatment    Orientation  Orientation  Overall Orientation Status: Within Functional Limits Social/Functional History  Social/Functional History  Lives With: Daughter  Type of Home: House  Home Layout: One level  Home Access: Stairs to enter with rails  Entrance Stairs - Number of Steps: 3  Home Equipment: Rolling walker  Receives Help From: Family  ADL Assistance: Independent  Ambulation Assistance: Independent(reports that he only uses the RW sometimes)  Transfer Assistance: Independent  Occupation: Retired  Cognition   Cognition  Overall Cognitive Status: WFL    Objective     Observation/Palpation  Posture: Good    AROM RLE (degrees)  RLE AROM: WFL  AROM LLE (degrees)  LLE AROM : WFL  Strength RLE  Strength RLE: WFL  Comment: at least 4/5  Strength LLE  Strength LLE: WFL  Comment: at least 4/5     Sensation  Overall Sensation Status: Impaired(neuropathy)  Bed mobility  Supine to Sit: Supervision  Sit to Supine: Unable to assess  Comment: pt sat on EOB x 5 mins after coming to sit due to SOA and housekeeping just mopping floor  Transfers  Sit to Stand: Minimal Assistance  Stand to sit: Minimal Assistance  Comment: Min A on and off toilet due to low seat, pt used grab bar  Ambulation  Ambulation?: Yes  Ambulation 1  Surface: level tile  Device: Rolling Walker  Other Apparatus: O2(2LO2)  Assistance: Minimal assistance  Quality of Gait: unsteady  Gait Deviations: Slow Meghan;Decreased step length;Decreased step height  Distance: 25' x 2  Comments: pt unable to have BM while on toilet, so at pt's request, asked RN to give him something to help him go. RN states he doesn't have anything ordered, so she would call the MD, and pt notified of process.      Balance  Posture: Good  Sitting - Static: Good;+  Sitting - Dynamic: Good;-  Standing - Static: Fair;-  Standing - Dynamic: Poor;+        Plan   Plan  Times per week: 3-7  Times per day: Daily  Plan weeks: 2 Current Treatment Recommendations: Strengthening, Balance Training, Functional Mobility Training, Transfer Training, Gait Training, Endurance Training, Safety Education & Training, Positioning, Equipment Evaluation, Education, & procurement, Patient/Caregiver Education & Training  Plan Comment: cont. PT per POC.   Safety Devices  Type of devices: Gait belt, Patient at risk for falls, Nurse notified, Call light within reach, Left in chair, Chair alarm in place    G-Code       OutComes Score                                                  AM-PAC Score             Goals  Short term goals  Time Frame for Short term goals: 2 wks  Short term goal 1: supine to sit indep  Short term goal 2: sit to stand indep  Short term goal 3: amb. 100' with RW SBA  Patient Goals   Patient goals : go home       Therapy Time   Individual Concurrent Group Co-treatment   Time In           Time Out           Minutes                   Lissa Watkins PT    Electronically signed by Lissa Watkins PT on 1/19/2021 at 12:25 PM

## 2021-01-19 NOTE — PROGRESS NOTES
CLINICAL PHARMACY NOTE: MEDS TO [address] Select Patient?: No  Total # of Prescriptions Filled: 3   The following medications were delivered to the patient:  · Docusate sodium 100mg  · eliquis 2.5mg  · Dexamethasone 6mg  Total # of Interventions Completed: 0  Time Spent (min): 45    Additional Documentation:  Medications were delivered to [facility] on 4th floor  Patient paid with cash

## 2021-01-20 ENCOUNTER — CARE COORDINATION (OUTPATIENT)
Dept: CASE MANAGEMENT | Age: 71
End: 2021-01-20

## 2021-01-20 NOTE — CARE COORDINATION
and did not even realize it. He said he did not have any issues with shortness of breath or other problems. He has since changed to another tank and is doing fine. He said the DME company is supposed to be bringing out something else for him, hopefully a concentrator perhaps. He is eating and drinking well. He has all of his meds and is taking them as ordered. He has an appointment for follow up scheduled. No other issues reported. Informed of CTC process, purpose, future calls, etc.  Ensured to have CTN contact information to be used as needed. Encouraged to call as needed for new issues, questions, etc.  Given the opportunity to ask questions and answered appropriately. CTN to follow up as indicated. Challenges to be reviewed by the provider   Additional needs identified to be addressed with provider No  none    Discussed COVID-19 related testing which was available at this time. Test results were positive. Patient informed of results, if available? Already aware. Method of communication with provider : none    Advance Care Planning:   Does patient have an Advance Directive:  reviewed and current. Was this a readmission? No  Patient stated reason for admission: \"I couldn't catch my breath. \"  Patients top risk factors for readmission: functional physical ability and medical condition    Care Transition Nurse (CTN) contacted the patient by telephone to perform post hospital discharge assessment. Verified name and  with patient as identifiers. Provided introduction to self, and explanation of the CTN role. CTN reviewed discharge instructions, medical action plan and red flags with patient who verbalized understanding. Patient given an opportunity to ask questions and does not have any further questions or concerns at this time. Were discharge instructions available to patient? Yes.  Reviewed appropriate site of care based on symptoms and resources available to patient including: PCP, Urgent care clinics and When to call 911. The patient agrees to contact the PCP office for questions related to their healthcare. Medication reconciliation was not performed with patient, declined. He verbalizes understanding of administration of home medications. Advised obtaining a 90-day supply of all daily and as-needed medications. Covid Risk Education    Patient has following risk factors of: COPD, pneumonia, acute respiratory failure, diabetes and COVID 19 Positive diagnosis. Education provided regarding infection prevention, and signs and symptoms of COVID-19 and when to seek medical attention with patient who verbalized understanding. Discussed exposure protocols and quarantine From [facility]: Are you at higher risk for severe illness?   and given an opportunity for questions and concerns. The patient agrees to contact the COVID-19 hotline [phone] or PCP office for questions related to COVID-19. Discussed follow-up appointments. Is follow up appointment scheduled within 7 days of discharge? Yes, Dr. Shanta Otero, 1/22/2021, 2:00 pm      Plan for follow-up call in 5-7 days based on severity of symptoms and risk factors. Plan for next call: - disease process mgmt, symptom mgmt, diet/hydration, pain control needs, medication mgmt, activity level, home safety needs, infection control, fall precautions, seeking medical attention, who/when to call prn any needs, etc.    CTN provided contact information for future needs.         Follow Up  Future Appointments   Date Time Provider Kendall [place]   1/22/2021  2:00 PM MD MONIQUE Dickinson [facility]   3/2/2021  2:00 PM MD MICK Parson [facility]   3/25/2021 11:00 AM Catrina Hilton MD [facility]       Larry Crowder RN

## 2021-01-22 ENCOUNTER — VIRTUAL VISIT (OUTPATIENT)
Dept: INTERNAL MEDICINE | Age: 71
End: 2021-01-22
Payer: MEDICARE

## 2021-01-22 DIAGNOSIS — E11.69 TYPE 2 DIABETES MELLITUS WITH OTHER SPECIFIED COMPLICATION, WITHOUT LONG-TERM CURRENT USE OF INSULIN (HCC): Primary | Chronic | ICD-10-CM

## 2021-01-22 PROCEDURE — 99496 TRANSJ CARE MGMT HIGH F2F 7D: CPT | Performed by: INTERNAL MEDICINE

## 2021-01-22 RX ORDER — PREGABALIN 50 MG/1
50 CAPSULE ORAL DAILY
Qty: 30 CAPSULE | Refills: 0 | Status: SHIPPED | OUTPATIENT
Start: 2021-01-22 | End: 2021-02-15

## 2021-01-22 ASSESSMENT — ENCOUNTER SYMPTOMS
VOMITING: 0
WHEEZING: 0
NAUSEA: 0
ABDOMINAL PAIN: 0
EYE DISCHARGE: 0
BLOOD IN STOOL: 0
SINUS PRESSURE: 0
SORE THROAT: 0
SHORTNESS OF BREATH: 1
EYE ITCHING: 0
BACK PAIN: 0
TROUBLE SWALLOWING: 0
ABDOMINAL DISTENTION: 0

## 2021-01-27 ENCOUNTER — CARE COORDINATION (OUTPATIENT)
Dept: CASE MANAGEMENT | Age: 71
End: 2021-01-27

## 2021-01-27 NOTE — CARE COORDINATION
[facility] Transitions Follow Up Call    2021    Patient: Maritza Mari  Patient : 1950   MRN: [id number]    Discharge Date: 21 RARS: Readmission Risk Score: 21         Spoke with: Darnell 49 Transitions Subsequent and Final Call    Subsequent and Final Calls  Do you have any ongoing symptoms?: No  Have your medications changed?: No  Do you have any questions related to your medications?: No  Do you currently have any active services?: No  Do you have any needs or concerns that I can assist you with?: No  Identified Barriers: None  Care Transitions Interventions  Other Interventions: Follow Up  Future Appointments   Date Time Provider Kendall [place]   3/2/2021  1:30 PM JULIEN Burgos Cardio [facility]   3/25/2021 11:00 AM Iveth Wiggins MD [facility]   Placed a call to the home and spoke with patient. He reported that he is doing very well. He said that he is not having any symptoms at this time. He feels much better he said. He has not used his oxugen for about three days. He has called for someone to come to get it. He said he is eating and drinking well. He is up and about, tolerating activity well. He is taking his meds as ordered. He has had his hospital follow up and has not had any med or treatment changes. To call prn needs. CTN to follow up in a few days.          Doris Garay RN

## 2021-01-28 ENCOUNTER — TELEPHONE (OUTPATIENT)
Dept: INTERNAL MEDICINE | Age: 71
End: 2021-01-28

## 2021-01-28 NOTE — TELEPHONE ENCOUNTER
We need to know what his oxygen saturations are without the oxygen as before we can say he does not need it

## 2021-01-28 NOTE — TELEPHONE ENCOUNTER
Patient states he's needing something stating he can d/c O2 so they can come and pick it up, pt states he does not need it and they won't come and get it without a letter from the

## 2021-02-03 ENCOUNTER — CARE COORDINATION (OUTPATIENT)
Dept: CASE MANAGEMENT | Age: 71
End: 2021-02-03

## 2021-02-03 NOTE — CARE COORDINATION
of Health               Patient currently reports that the following symptoms have improved:  No ongoing symptoms. No further outreach scheduled with this CTN. Episode of Care resolved. Patient has this CTN contact information if future needs arise.     Luisana Keating RN

## 2021-02-15 DIAGNOSIS — E11.69 TYPE 2 DIABETES MELLITUS WITH OTHER SPECIFIED COMPLICATION, WITHOUT LONG-TERM CURRENT USE OF INSULIN (HCC): Chronic | ICD-10-CM

## 2021-02-15 RX ORDER — PREGABALIN 50 MG/1
CAPSULE ORAL
Qty: 30 CAPSULE | Refills: 0 | Status: SHIPPED | OUTPATIENT
Start: 2021-02-15 | End: 2021-03-19

## 2021-02-15 RX ORDER — SIMVASTATIN 40 MG
TABLET ORAL
Qty: 90 TABLET | Refills: 0 | Status: SHIPPED | OUTPATIENT
Start: 2021-02-15 | End: 2021-05-17

## 2021-02-15 NOTE — TELEPHONE ENCOUNTER
Hilda Dunaway called requesting a refill of the below medication which has been pended for you:     Requested Prescriptions     Pending Prescriptions Disp Refills    simvastatin (ZOCOR) 40 MG tablet [Pharmacy Med Name: Simvastatin 40 MG Oral Tablet] 90 tablet 0     Sig: Take 1 tablet by mouth once daily    pregabalin (LYRICA) 50 MG capsule [Pharmacy Med Name: Pregabalin 50 MG Oral Capsule] 30 capsule 0     Sig: Take 1 capsule by mouth once daily       Last Appointment Date: 1/22/2021  Next Appointment Date: 3/25/2021    No Known Allergies

## 2021-03-02 ENCOUNTER — OFFICE VISIT (OUTPATIENT)
Dept: CARDIOLOGY CLINIC | Age: 71
End: 2021-03-02
Payer: MEDICARE

## 2021-03-02 VITALS
BODY MASS INDEX: 34.27 KG/M2 | HEART RATE: 54 BPM | HEIGHT: 72 IN | DIASTOLIC BLOOD PRESSURE: 60 MMHG | SYSTOLIC BLOOD PRESSURE: 104 MMHG | WEIGHT: 253 LBS | OXYGEN SATURATION: 98 %

## 2021-03-02 DIAGNOSIS — I10 ESSENTIAL HYPERTENSION: ICD-10-CM

## 2021-03-02 DIAGNOSIS — Z95.5 HISTORY OF CORONARY ARTERY STENT PLACEMENT: ICD-10-CM

## 2021-03-02 DIAGNOSIS — I25.10 CORONARY ARTERY DISEASE INVOLVING NATIVE CORONARY ARTERY OF NATIVE HEART WITHOUT ANGINA PECTORIS: Primary | ICD-10-CM

## 2021-03-02 DIAGNOSIS — Z95.1 HX OF CABG: ICD-10-CM

## 2021-03-02 DIAGNOSIS — I50.22 CHRONIC SYSTOLIC (CONGESTIVE) HEART FAILURE (HCC): ICD-10-CM

## 2021-03-02 DIAGNOSIS — E78.2 MIXED HYPERLIPIDEMIA: ICD-10-CM

## 2021-03-02 PROCEDURE — 99214 OFFICE O/P EST MOD 30 MIN: CPT | Performed by: NURSE PRACTITIONER

## 2021-03-02 RX ORDER — RAMIPRIL 5 MG/1
5 CAPSULE ORAL DAILY
Qty: 90 CAPSULE | Refills: 3 | Status: SHIPPED | OUTPATIENT
Start: 2021-03-02

## 2021-03-02 RX ORDER — FUROSEMIDE 40 MG/1
40 TABLET ORAL DAILY
Qty: 90 TABLET | Refills: 3 | Status: SHIPPED | OUTPATIENT
Start: 2021-03-02

## 2021-03-02 RX ORDER — TAMSULOSIN HYDROCHLORIDE 0.4 MG/1
0.4 CAPSULE ORAL NIGHTLY
COMMUNITY
Start: 2020-08-25 | End: 2021-03-02

## 2021-03-02 NOTE — PROGRESS NOTES
[facility] [address] [place] [address]. [address] [facility]  [phone] office  [phone] fax      OFFICE VISIT:  3/2/2021    [address]: 1950  Reason For Visit:  Marbella Amezcua is a 65 y.o. male who is here for 6 Month Follow-Up (Patient denies any cardiac symptoms. ) and Coronary Artery Disease    History:   Diagnosis Orders   1. Coronary artery disease involving native coronary artery of native heart without angina pectoris     2. Essential hypertension     3. Mixed hyperlipidemia     4. Hx of CABG     5. History of coronary artery stent placement      10/17/19 KIARN to left main into proximal  circumflex; PCI to mid circumflex - orbital atherectomy and KIRAN stent - Dr. Hernando Navarrete   6. Chronic systolic (congestive) heart failure [facility])       The patient presents today for cardiology follow up. Mr. Marbella Amezcua reports he is doing well. He was hospitalized in January due to [place] 19 with pneumonia. His discharge med list includes Eliquis which patient reports \"none of those prescriptions I got from the hospital had refills. I am not taking that medication. \" Called and confirmed with pharmacy - no Eliquis refills. Patient denies hx of embolic event or AF. I see no documentation of such during January admission. Mr. Marbella Amezcua reports BP has been running low. He requested that Altace be reduced back to 5 mg daily. He reports Lasix was decreased some time ago and he wants it increased back to 40 mg daily due leg edema. He reports was much worse in winter but better now. He has a hx of systolic heart failure. EF at time of last cath 2019 was normal.   The patient denies symptoms to suggest myocardial ischemia, heart failure or arrhythmia. BP is well controlled on current regimen. The patient's PCP monitors cholesterol.       Subjective  metoprolol tartrate (LOPRESSOR) 50 MG tablet Take 1 tablet by mouth twice daily (Patient taking differently: Take 50 mg by mouth daily ) 180 tablet 3    magnesium (MAGNESIUM-OXIDE) 250 MG TABS tablet Take 250 mg by mouth daily      Cinnamon 500 MG CAPS Take 1,000 mg by mouth daily      ASPIRIN 81 PO Take 81 mg by mouth daily       insulin NPH (HUMULIN N) 100 UNIT/ML injection vial 20 UNITS IN AM AND 10 UNITS IN PM (Patient taking differently: 30 UNITS IN AM AND 20 UNITS IN PM) 1 vial 3    Lancets MISC PT IS INSULIN DEPT AND CAN CHECK TWICE DAILY WHEN NEEDED   DX E11.8 100 each 3    Insulin Pen Needle 32G X 4 MM MISC 1 each by Does not apply route daily 100 each 3    Multiple Vitamins-Minerals (THERAPEUTIC MULTIVITAMIN-MINERALS) tablet Take 1 tablet by mouth daily Alive mens 50+      vitamin E 400 UNIT capsule Take 400 Units by mouth daily.  apixaban (ELIQUIS) 2.5 MG TABS tablet Take 1 tablet by mouth 2 times daily 60 tablet 0     No current facility-administered medications for this visit. Allergies: Patient has no known allergies. Review of Systems  Constitutional  no appetite change, or unexpected weight change. No fever, chills or diaphoresis. No significant change in activity level or new onset of fatigue. HEENT  no significant rhinorrhea or epistaxis. No tinnitus or significant hearing loss. Eyes  no sudden vision change or amaurosis. No corneal arcus, xantholasma, subconjunctival hemorrhage or discharge. Respiratory  no significant wheezing, stridor, apnea or cough. No dyspnea on exertion or shortness of air. Cardiovascular  no exertional chest pain to suggest myocardial ischemia. No orthopnea or PND. No sensation of sustained arrythmia. No occurrence of slow heart rate. No palpitations. No claudication. Gastrointestinal  no abdominal swelling or pain. No blood in stool. No severe constipation, diarrhea, nausea, or vomiting. Genitourinary  no dysuria, frequency, or urgency. No flank pain or hematuria. Musculoskeletal  no back pain or myalgia. No problems with gait. Extremities - no clubbing or cyanosis. + mild bilateral lower extremity edema. Skin  no color change or rash. No pallor. No new surgical incision. Neurologic  no speech difficulty, facial asymmetry or lateralizing weakness. No seizures, presyncope or syncope. No significant dizziness. Hematologic  no easy bruising or excessive bleeding. Psychiatric  no severe anxiety or insomnia. No confusion. All other review of systems are negative. Objective  Vital Signs - /60   Pulse 54   Ht 5' 11.5\" (1.816 m)   Wt 253 lb (114.8 kg)   SpO2 98%   BMI 34.79 kg/m²   General - Leticia Peacemaker is alert, cooperative, and pleasant. Well groomed. No acute distress. Body habitus - Body mass index is 34.79 kg/m². HEENT  Head is normocephalic. No circumoral cyanosis. Dentition is normal.  EYES -   Lids normal without ptosis. No discharge, edema or subconjunctival hemorrhage. Neck - Symmetrical without apparent mass or lymphadenopathy. Respiratory - Normal respiratory effort without use of accessory muscles. Ausculatation reveals vesicular breath sounds without crackles, wheezes, rub or rhonchi. Cardiovascular  No jugular venous distention. Auscultation reveals regular rate and rhythm. No audible clicks, gallop or rub. No murmur. No lower extremity varicosities. No carotid bruits. Abdominal -  No visible distention, mass or pulsations. Extremities - No clubbing or cyanosis. No statis dermatitis or ulcers. 1+ pitting lower extremity edema. Musculoskeletal -   No Osler's nodes. No kyphosis or scoliosis. Gait is even and regular without limp or shuffle. Ambulates without assistance. Skin -  Warm and dry; no rash or pallor. No new surgical wound. Neurological - No focal neurological deficits. Thought processes coherent. No apparent tremor. Oriented to person, place and time. Psychiatric -  Appropriate affect and mood. Data reviewed:  1/19/21 Hospital course:  Hospital Course:   1-18: Patient presented to ED overnight with increased shortness of breath for one week. History of COPD. Some sick contacts with COVID-19. No fever, chills, or cough. COVID-19 positive, requiring supplemental oxygen. Admitted to med/surg COVID-19 hernandez on dexamethasone and remdesivir. 1-19: Patient is feeling better, requiring 2 LPM supplemental oxygen only. Will stop remdesivir, which is of questionable risk/benefit with chronic renal disease, and continue dexamethasone x9 days and discharge to home. 1/17/21 CXR  Impression   1. Mild increase in interstitial markings may represent mild edema or   mild interstitial pneumonitis. 2. Heart size upper limits of normal.   Signed by Dr Sis Mejía on 1/18/2021 7:07 AM     10/22/19 cath  Left main, triple-vessel disease. Patent but attenuated LIMA to mid LAD with antegrade flow and intermediate   grade left subclavian artery stenosis. Occluded SVG Y graft to OM1 and OM 2. Normal LV ejection fraction. Successful PCI with drug-eluting stent to left main into proximal   circumflex (4.0 x 9 mm resolute and 3.5 x 22 mm resolute integrity stents)   Successful PCI to mid circumflex with orbital atherectomy and drug-eluting   stent (2.5 x 26 mm resolute integrity). Recommendations    Medical management. Smoking cessation. Consideration for PCI to RCA at symptomatic.     Signatures    ----------------------------------------------------------------   Electronically signed by Jean Paul Rashid MD(Performing Physician) on   10/22/2019 07:25    Lab Results   Component Value Date    WBC 3.2 (L) 01/17/2021    HGB 11.1 (L) 01/17/2021    HCT 35.5 (L) 01/17/2021    MCV 97.8 (H) 01/17/2021     01/17/2021     Lab Results Hyperlipidemia - LDL 92 on Zocor 40 mg daily. DM - managed by PCP. HgA1c 6.0. Patient is compliant with medication regimen. BP Readings from Last 3 Encounters:   03/02/21 104/60   01/19/21 (!) 170/70   09/24/20 114/62    Pulse Readings from Last 3 Encounters:   03/02/21 54   01/19/21 66   09/24/20 54        Wt Readings from Last 3 Encounters:   03/02/21 253 lb (114.8 kg)   01/18/21 259 lb 12.8 oz (117.8 kg)   09/24/20 240 lb (108.9 kg)     Plan  Previous cardiac history and records reviewed. Continue current medical management. Reduce Altace to 5 mg daily. Increase Lasix to 40 mg daily. Recheck BMP in 2 weeks. Advised home BP and weight monitoring as well as low sodium diet. Continue other current medications as directed. Continue to follow up with primary care provider for non cardiac medical problems. Call the office with any problems, questions or concerns at [phone]. Cardiology follow up: Dr. Quesada Pain 6 months. Educational included in patient instructions. Heart health.      JULIEN Souza

## 2021-03-02 NOTE — PATIENT INSTRUCTIONS
New instructions for today:  Get labs rechecked this month. Monitor blood pressure at home. Weigh yourself daily without clothing at the same time each day. Record a daily weight log. Call the office if you gain 3 pounds or more in 2 to 3 days or 5 pounds in one week. A sudden weight gain may mean that your heart failure is getting worse. Sodium causes your body to hold on to extra water. This may cause your heart failure symptoms to get worse. Limit sodium to 2,000 milligrams (mg) a day or less. That is less than 1 teaspoon of salt a day, including all the salt you eat in cooking or in packaged foods. Fluid restriction of 1500ml per day (about 6 cups of fluid per day). Patient Instructions:  Continue current medications as prescribed. Always keep a current medication list. Bring your medications to every office visit. Continue to follow up with primary care provider for non cardiac medical problems. Call the office with any problems, questions or concerns at [phone]. If you have been asked to keep a blood pressure log, do so for 2 weeks. Call the office to report readings to the triage nurse at [phone]. Follow up with cardiologist as scheduled. The following educational material has been included in this after visit summary for your review: Life simple 7. Heart health. Life simple 7  1) Manage blood pressure - high blood pressure is a major risk factor for heart disease and stroke. Keeping blood pressure in health range reduces strain on your heart, arteries and kidneys. Blood pressure goal is less than 130/80. 2) Control cholesterol - contributes to plaque, which can clog arteries and lead to heart disease and stroke. When you control your cholesterol you are giving your arteries their best chance to remain clear. It is recommended that you get cholesterol lab work done once a year. Follow-up care is a key part of your treatment and safety. Be sure to make and go to all appointments, and call your doctor if you are having problems. It's also a good idea to know your test results and keep a list of the medicines you take. How can you care for yourself at home? Diet  · Use less salt when you cook and eat. This helps lower your blood pressure. Taste food before salting. Add only a little salt when you think you need it. With time, your taste buds will adjust to less salt. · Eat fewer snack items, fast foods, canned soups, and other high-salt, high-fat, processed foods. · Read food labels and try to avoid saturated and trans fats. They increase your risk of heart disease by raising cholesterol levels. · Limit the amount of solid fat-butter, margarine, and shortening-you eat. Use olive, peanut, or canola oil when you cook. Bake, broil, and steam foods instead of frying them. · Eat a variety of fruit and vegetables every day. Dark green, deep orange, red, or yellow fruits and vegetables are especially good for you. Examples include spinach, carrots, peaches, and berries. · Foods high in fiber can reduce your cholesterol and provide important vitamins and minerals. High-fiber foods include whole-grain cereals and breads, oatmeal, beans, brown rice, citrus fruits, and apples. · Eat lean proteins. Heart-healthy proteins include seafood, lean meats and poultry, eggs, beans, peas, nuts, seeds, and soy products. · Limit drinks and foods with added sugar. These include candy, desserts, and soda pop. Lifestyle changes  · If your doctor recommends it, get more exercise. Walking is a good choice. Bit by bit, increase the amount you walk every day. Try for at least 30 minutes on most days of the week. You also may want to swim, bike, or do other activities. · Do not smoke. If you need help quitting, talk to your doctor about stop-smoking programs and medicines. These can increase your chances of quitting for good. Quitting smoking may be the most important step you can take to protect your heart. It is never too late to quit. · Limit alcohol to 2 drinks a day for men and 1 drink a day for women. Too much alcohol can cause health problems. · Manage other health problems such as diabetes, high blood pressure, and high cholesterol. If you think you may have a problem with alcohol or drug use, talk to your doctor. Medicines  · Take your medicines exactly as prescribed. Call your doctor if you think you are having a problem with your medicine. · If your doctor recommends aspirin, take the amount directed each day. Make sure you take aspirin and not another kind of pain reliever, such as acetaminophen (Tylenol). When should you call for help? [id number] if you have symptoms of a heart attack. These may include:  · Chest pain or pressure, or a strange feeling in the chest.  · Sweating. · Shortness of breath. · Pain, pressure, or a strange feeling in the back, neck, jaw, or upper belly or in one or both shoulders or arms. · Lightheadedness or sudden weakness. · A fast or irregular heartbeat. After you call 911, the  may tell you to chew 1 adult-strength or 2 to 4 low-dose aspirin. Wait for an ambulance. Do not try to drive yourself. Watch closely for changes in your health, and be sure to contact your doctor if you have any problems. Where can you learn more? Go to https://BetaUsersNow.commingo.MicroTransponder. org and sign in to your [employer] account. Enter R337 in the Located within [facility] box to learn more about \"A Healthy Heart: Care Instructions. \"     If you do not have an account, please click on the \"Sign Up Now\" link. Current as of: December 16, 2019               Content Version: 12.5  © 1688-1441 Healthwise, [employer]orporated. Care instructions adapted under license by [facility] ([facility]). If you have questions about a medical condition or this instruction, always ask your healthcare professional. [address] any warranty or liability for your use of this information.

## 2021-03-15 RX ORDER — SYRINGE AND NEEDLE,INSULIN,1ML 31GX15/64"
SYRINGE, EMPTY DISPOSABLE MISCELLANEOUS
Qty: 200 EACH | Refills: 0 | Status: SHIPPED | OUTPATIENT
Start: 2021-03-15 | End: 2021-06-22

## 2021-03-15 RX ORDER — METOPROLOL TARTRATE 50 MG/1
TABLET, FILM COATED ORAL
Qty: 180 TABLET | Refills: 0 | Status: SHIPPED | OUTPATIENT
Start: 2021-03-15 | End: 2021-06-08

## 2021-03-15 NOTE — TELEPHONE ENCOUNTER
Ruben Paul called requesting a refill of the below medication which has been pended for you:     Requested Prescriptions     Pending Prescriptions Disp Refills    RELION INSULIN SYRINGE 31G X 15/64\" 1 ML [address] [facility] [Pharmacy Med Name: Mayela Cantu 1ML/31G/6MM [facility]] 200 each 0     Sig: USE 1  TWICE DAILY AS DIRECTED       Last Appointment Date: 1/22/2021  Next Appointment Date: 3/25/2021    No Known Allergies

## 2021-03-19 DIAGNOSIS — E11.69 TYPE 2 DIABETES MELLITUS WITH OTHER SPECIFIED COMPLICATION, WITHOUT LONG-TERM CURRENT USE OF INSULIN (HCC): Chronic | ICD-10-CM

## 2021-03-19 DIAGNOSIS — E78.2 MIXED HYPERLIPIDEMIA: Chronic | ICD-10-CM

## 2021-03-19 DIAGNOSIS — I10 ESSENTIAL HYPERTENSION: Chronic | ICD-10-CM

## 2021-03-19 DIAGNOSIS — Z95.1 S/P CABG (CORONARY ARTERY BYPASS GRAFT): ICD-10-CM

## 2021-03-19 DIAGNOSIS — I25.10 CORONARY ARTERY DISEASE INVOLVING NATIVE CORONARY ARTERY OF NATIVE HEART WITHOUT ANGINA PECTORIS: Chronic | ICD-10-CM

## 2021-03-19 LAB
ALBUMIN SERPL-MCNC: 3.9 G/DL (ref 3.5–5.2)
ALP BLD-CCNC: 59 U/L (ref 40–130)
ALT SERPL-CCNC: 18 U/L (ref 5–41)
ANION GAP SERPL CALCULATED.3IONS-SCNC: 9 MMOL/L (ref 7–19)
AST SERPL-CCNC: 21 U/L (ref 5–40)
BILIRUB SERPL-MCNC: 0.4 MG/DL (ref 0.2–1.2)
BUN BLDV-MCNC: 25 MG/DL (ref 8–23)
CALCIUM SERPL-MCNC: 9.1 MG/DL (ref 8.8–10.2)
CHLORIDE BLD-SCNC: 101 MMOL/L (ref 98–111)
CHOLESTEROL, TOTAL: 141 MG/DL (ref 160–199)
CO2: 28 MMOL/L (ref 22–29)
CREAT SERPL-MCNC: 1.1 MG/DL (ref 0.5–1.2)
GFR AFRICAN AMERICAN: >59
GFR NON-AFRICAN AMERICAN: >60
GLUCOSE BLD-MCNC: 136 MG/DL (ref 74–109)
HBA1C MFR BLD: 6.5 % (ref 4–6)
HDLC SERPL-MCNC: 31 MG/DL (ref 55–121)
LDL CHOLESTEROL CALCULATED: 78 MG/DL
POTASSIUM SERPL-SCNC: 5 MMOL/L (ref 3.5–5)
SODIUM BLD-SCNC: 138 MMOL/L (ref 136–145)
TOTAL PROTEIN: 6.7 G/DL (ref 6.6–8.7)
TRIGL SERPL-MCNC: 161 MG/DL (ref 0–149)

## 2021-03-19 RX ORDER — PREGABALIN 50 MG/1
CAPSULE ORAL
Qty: 30 CAPSULE | Refills: 0 | Status: SHIPPED | OUTPATIENT
Start: 2021-03-19 | End: 2021-04-20

## 2021-03-19 NOTE — TELEPHONE ENCOUNTER
Avel Angelucci called requesting a refill of the below medication which has been pended for you:     Requested Prescriptions     Pending Prescriptions Disp Refills    pregabalin (LYRICA) 50 MG capsule [Pharmacy Med Name: Pregabalin 50 MG Oral Capsule] 30 capsule 0     Sig: Take 1 capsule by mouth once daily       Last Appointment Date: 1/22/2021  Next Appointment Date: 3/25/2021    No Known Allergies

## 2021-03-25 ENCOUNTER — OFFICE VISIT (OUTPATIENT)
Dept: INTERNAL MEDICINE | Age: 71
End: 2021-03-25
Payer: MEDICARE

## 2021-03-25 VITALS
DIASTOLIC BLOOD PRESSURE: 64 MMHG | HEART RATE: 56 BPM | HEIGHT: 72 IN | WEIGHT: 245 LBS | OXYGEN SATURATION: 96 % | BODY MASS INDEX: 33.18 KG/M2 | SYSTOLIC BLOOD PRESSURE: 108 MMHG

## 2021-03-25 DIAGNOSIS — Z00.00 ROUTINE GENERAL MEDICAL EXAMINATION AT A HEALTH CARE FACILITY: ICD-10-CM

## 2021-03-25 DIAGNOSIS — E11.69 TYPE 2 DIABETES MELLITUS WITH OTHER SPECIFIED COMPLICATION, WITHOUT LONG-TERM CURRENT USE OF INSULIN (HCC): Chronic | ICD-10-CM

## 2021-03-25 DIAGNOSIS — Z13.31 SCREENING FOR DEPRESSION: ICD-10-CM

## 2021-03-25 DIAGNOSIS — E78.2 MIXED HYPERLIPIDEMIA: Chronic | ICD-10-CM

## 2021-03-25 DIAGNOSIS — I10 ESSENTIAL HYPERTENSION: Primary | Chronic | ICD-10-CM

## 2021-03-25 PROCEDURE — G0439 PPPS, SUBSEQ VISIT: HCPCS | Performed by: INTERNAL MEDICINE

## 2021-03-25 SDOH — ECONOMIC STABILITY: TRANSPORTATION INSECURITY
IN THE PAST 12 MONTHS, HAS LACK OF TRANSPORTATION KEPT YOU FROM MEETINGS, WORK, OR FROM GETTING THINGS NEEDED FOR DAILY LIVING?: NOT ASKED

## 2021-03-25 ASSESSMENT — PATIENT HEALTH QUESTIONNAIRE - PHQ9
2. FEELING DOWN, DEPRESSED OR HOPELESS: 0
SUM OF ALL RESPONSES TO PHQ QUESTIONS 1-9: 0
1. LITTLE INTEREST OR PLEASURE IN DOING THINGS: 0

## 2021-03-25 ASSESSMENT — LIFESTYLE VARIABLES: HOW OFTEN DO YOU HAVE A DRINK CONTAINING ALCOHOL: 0

## 2021-03-25 NOTE — PATIENT INSTRUCTIONS
Personalized Preventive Plan for Manuelito Smith - 3/25/2021  Medicare offers a range of preventive health benefits. Some of the tests and screenings are paid in full while other may be subject to a deductible, co-insurance, and/or copay. Some of these benefits include a comprehensive review of your medical history including lifestyle, illnesses that may run in your family, and various assessments and screenings as appropriate. After reviewing your medical record and screening and assessments performed today your provider may have ordered immunizations, labs, imaging, and/or referrals for you. A list of these orders (if applicable) as well as your Preventive Care list are included within your After Visit Summary for your review. Other Preventive Recommendations:    · A preventive eye exam performed by an eye specialist is recommended every 1-2 years to screen for glaucoma; cataracts, macular degeneration, and other eye disorders. · A preventive dental visit is recommended every 6 months. · Try to get at least 150 minutes of exercise per week or 10,000 steps per day on a pedometer . · Order or download the FREE \"Exercise & Physical Activity: Your Everyday Guide\" from [employer]. Call 2-375.809.6004 or search [employer] online. · You need 9707-0747 mg of calcium and 0415-4528 IU of vitamin D per day. It is possible to meet your calcium requirement with diet alone, but a vitamin D supplement is usually necessary to meet this goal.  · When exposed to the sun, use a sunscreen that protects against both UVA and UVB radiation with an SPF of 30 or greater. Reapply every 2 to 3 hours or after sweating, drying off with a towel, or swimming. · Always wear a seat belt when traveling in a car. Always wear a helmet when riding a bicycle or motorcycle.

## 2021-03-25 NOTE — PROGRESS NOTES
Medicare Annual Wellness Visit  Name: Sukh Guo Date: 3/25/2021   MRN: [id number] Sex: Male   Age: 65 y.o. Ethnicity: Non-/Non    : 1950 Race: Gerda Alegria is here for Medicare AWV    Screenings for behavioral, psychosocial and functional/safety risks, and cognitive dysfunction are all negative except as indicated below. These results, as well as other patient data from the [address] form, are documented in Flowsheets linked to this Encounter. No Known Allergies      Prior to Visit Medications    Medication Sig Taking? Authorizing Provider   pregabalin (LYRICA) 50 MG capsule Take 1 capsule by mouth once daily Yes Mariah Garcia MD   metoprolol tartrate (LOPRESSOR) 50 MG tablet Take 1 tablet by mouth twice daily Yes JULIEN Wiley   RELION INSULIN SYRINGE 31G X 15/64\" 1 ML MISC USE 1  TWICE DAILY AS DIRECTED Yes Mariah Garcia MD   ramipril (ALTACE) 5 MG capsule Take 1 capsule by mouth daily Yes JULIEN Wiley   furosemide (LASIX) 40 MG tablet Take 1 tablet by mouth daily Yes JULIEN Wiley   simvastatin (ZOCOR) 40 MG tablet Take 1 tablet by mouth once daily Yes Mariah Garcia MD   metFORMIN (GLUCOPHAGE) 1000 MG tablet Take 1 tablet by mouth twice daily Yes Mariah Garcia MD   pioglitazone (ACTOS) 45 MG tablet Take 1 tablet by mouth once daily Yes Mariah Garcia MD   clopidogrel (PLAVIX) 75 MG tablet Take 1 tablet by mouth once daily Yes JULIEN Gonzales - CNP   tamsulosin (FLOMAX) 0.4 MG capsule TAKE 1 CAPSULE BY MOUTH ONCE DAILY AT NIGHT Yes Historical Provider, MD   nitroGLYCERIN (NITROSTAT) 0.4 MG SL tablet Place 1 tablet under the tongue every 5 minutes as needed for Chest pain up to max of 3 total doses. If no relief after 1 dose, call 911.  Yes JULIEN Wiley   blood glucose test strips (ASCENSIA AUTODISC VI;ONE TOUCH ULTRA TEST VI) strip USE 1 STRIP TO CHECK GLUCOSE TWICE DAILY AS NEEDED Yes Samira Barreto Lonny Couch MD   spironolactone (ALDACTONE) 25 MG tablet Take 1 tablet by mouth daily Yes Catrachito Model, APRN   magnesium (MAGNESIUM-OXIDE) 250 MG TABS tablet Take 250 mg by mouth daily Yes Historical Provider, MD   Cinnamon 500 MG CAPS Take 1,000 mg by mouth daily Yes Historical Provider, MD   ASPIRIN 81 PO Take 81 mg by mouth daily  Yes Historical Provider, MD   insulin NPH (HUMULIN N) 100 UNIT/ML injection vial 20 UNITS IN AM AND 10 UNITS IN PM  Patient taking differently: 30 UNITS IN AM AND 20 UNITS IN PM Yes Ezekiel Velazco MD   Lancets MISC PT IS INSULIN DEPT AND CAN CHECK TWICE DAILY WHEN NEEDED   DX E11.8 Yes Ezekiel Velazco MD   Insulin Pen Needle 32G X 4 MM MISC 1 each by Does not apply route daily Yes Ezekiel Velazco MD   Multiple Vitamins-Minerals (THERAPEUTIC MULTIVITAMIN-MINERALS) tablet Take 1 tablet by mouth daily Alive mens 50+ Yes Historical Provider, MD   vitamin E 400 UNIT capsule Take 400 Units by mouth daily.     Historical Provider, MD         Past Medical History:   Diagnosis Date    Arthritis     BPH (benign prostatic hyperplasia)     CAD (coronary artery disease)     CABG 2001 ALYSE to LAD, SVG Y-graft to LM 1&2    Cancer ([address]     SKIN CA ON NOSE    [facility]     Diabetes mellitus ([facility]     Hyperlipidemia     PCP manages    Hypertension     LONG TERM ANTICOAGULENT USE     MDRO (multiple drug resistant organisms) resistance 2003    STAPH INFECTION ON PENIS    Palliative care patient 01/18/2021    S/P CABG (coronary artery bypass graft) 08/05/2015    Tobacco abuse        Past Surgical History:   Procedure Laterality Date    APPENDECTOMY      CARDIAC CATHETERIZATION  2/26/01    EF in excesss 60%    CARDIAC CATHETERIZATION  7/22/98    EF 45%    CATARACT REMOVAL WITH IMPLANT  2001    CIRCUMCISION      DUE TO STAPH INFECTION    CORONARY ARTERY BYPASS GRAFT  2/27/01    x3, LIMA to LAD,SVG Y graft to 1st & 2nd Obtuse yeimy branches    DIAGNOSTIC CARDIAC CATH LAB PROCEDURE      PTCA      SKIN BIOPSY      SKIN CANCER EXCISION      NOSE         Family History   Problem Relation Age of Onset    Stroke Father        CareTeam (Including outside providers/suppliers regularly involved in providing care):   Patient Care Team:  Lisa Hancock MD as PCP - General (Internal Medicine)  Lisa Hancock [facility]  Pily Espino MD as Consulting Physician (Cardiology)    Wt Readings from Last 3 Encounters:   03/25/21 245 lb (111.1 kg)   03/02/21 253 lb (114.8 kg)   01/18/21 259 lb 12.8 oz (117.8 kg)     Vitals:    03/25/21 1115   BP: 108/64   Pulse: 56   SpO2: 96%   Weight: 245 lb (111.1 kg)   Height: 5' 11.5\" (1.816 m)     Body mass index is 33.69 kg/m². Based upon direct observation of the patient, evaluation of cognition reveals recent and remote memory intact.     General Appearance: alert and oriented to person, place and time, well developed and well- nourished, in no acute distress  Skin: warm and dry, no rash or erythema  Head: normocephalic and atraumatic  Eyes: pupils equal, round, and reactive to light, extraocular eye movements intact, conjunctivae normal  ENT: tympanic membrane, external ear and ear canal normal bilaterally, nose without deformity, nasal mucosa and turbinates normal without polyps  Neck: supple and non-tender without mass, no thyromegaly or thyroid nodules, no cervical lymphadenopathy  Pulmonary/Chest: clear to auscultation bilaterally- no wheezes, rales or rhonchi, normal air movement, no respiratory distress  Cardiovascular: normal rate, regular rhythm, normal S1 and S2, no murmurs, rubs, clicks, or gallops, distal pulses intact, no carotid bruits  Abdomen: soft, non-tender, non-distended, normal bowel sounds, no masses or organomegaly  Extremities: no cyanosis, clubbing or edema  Musculoskeletal: normal range of motion, no joint swelling, deformity or tenderness  Neurologic: reflexes normal and symmetric, no cranial nerve deficit, gait, coordination and speech normal    Patient's complete Health Risk Assessment and screening values have been reviewed and are found in Flowsheets. The following problems were reviewed today and where indicated follow up appointments were made and/or referrals ordered. Positive Risk Factor Screenings with Interventions:            General Health and ACP:  General  In general, how would you say your health is?: Good  In the past 7 days, have you experienced any of the following?  New or Increased Pain, New or Increased Fatigue, Loneliness, Social Isolation, Stress or Anger?: None of These  Do you get the social and emotional support that you need?: Yes  Do you have a Living Will?: Yes  Advance Directives     Power of  Living Will ACP-Advance Directive ACP-Power of     Not on File Filed on 10/17/19 Filed Not on File      General Health Risk Interventions:  · None    Health Habits/Nutrition:  Health Habits/Nutrition  Do you exercise for at least 20 minutes 2-3 times per week?: Yes  Have you lost any weight without trying in the past 3 months?: No  Do you eat only one meal per day?: No  Have you seen the dentist within the past year?: Yes  Body mass index: (!) 33.69  Health Habits/Nutrition Interventions:  · None does have chronic back pain and is going to pain management       Personalized Preventive Plan   Current Health Maintenance Status  Immunization History   Administered Date(s) Administered    COVID-19, Moderna, PF, 100mcg/0.5mL 03/17/2021    Influenza, High Dose (Fluzone 65 yrs and older) 11/03/2016, 10/07/2017, 10/13/2018    Influenza, Triv, inactivated, subunit, adjuvanted, IM (Fluad 65 yrs and older) 10/19/2019, 09/24/2020    Pneumococcal Conjugate 13-valent (Keila Lathe) 03/05/2018    Tdap (Boostrix, Adacel) 03/05/2018, 06/05/2018        Health Maintenance   Topic Date Due    Shingles Vaccine (1 of 2) Never done    Colon cancer screen colonoscopy  07/10/2018    Pneumococcal 65+ years Vaccine (2 of 2 - PPSV23) 03/05/2019    Annual Wellness Visit (AWV)  Never done    COVID-19 Vaccine (2 - Moderna 2-dose series) 04/14/2021    A1C test (Diabetic or Prediabetic)  03/19/2022    Potassium monitoring  03/19/2022    Creatinine monitoring  03/19/2022    DTaP/Tdap/Td vaccine (3 - Td) 06/05/2028    Flu vaccine  Completed    Hepatitis A vaccine  Aged Out    Hib vaccine  Aged Out    Meningococcal (ACWY) vaccine  Aged Out     Recommendations for [facility] Due: see orders and patient instructions/AVS.  . Recommended screening schedule for the next 5-10 years is provided to the patient in written form: see Patient Flor Quiñones was seen today for medicare awv. Diagnoses and all orders for this visit:    Essential hypertension  Essential hypertension under good control. Blood pressure is 108/64. He is doing well on his medication. He takes metoprolol and ramipril as well as Lasix with good results and will continue as he is doing. Type 2 diabetes mellitus with other specified complication, without long-term current use of insulin (HCC)  Type 2 diabetes mellitus which is under good control. His hemoglobin A1c is 6.5. He is on Actos Glucophage and will continue with his dietary restrictions as he is doing. Mixed hyperlipidemia  Hyperlipidemia under good control with the simvastatin 40. His LDL is less than 100. Total is less than 200. Triglycerides less than 150. Liver looks good. We will continue the same dose. Otherwise he is doing well and is stable. He is negative on his depression screen. 8-15 minutes has been spent assessing, reviewing and discussing the depression screening.

## 2021-04-08 RX ORDER — PIOGLITAZONEHYDROCHLORIDE 45 MG/1
TABLET ORAL
Qty: 90 TABLET | Refills: 0 | Status: SHIPPED | OUTPATIENT
Start: 2021-04-08 | End: 2021-07-06

## 2021-04-19 RX ORDER — CLOPIDOGREL BISULFATE 75 MG/1
TABLET ORAL
Qty: 90 TABLET | Refills: 3 | Status: SHIPPED | OUTPATIENT
Start: 2021-04-19

## 2021-04-19 NOTE — TELEPHONE ENCOUNTER
Joseph Robles called requesting a refill of the below medication which has been pended for you:     Requested Prescriptions     Pending Prescriptions Disp Refills    metFORMIN (GLUCOPHAGE) 1000 MG tablet [Pharmacy Med Name: metFORMIN HCl 1000 MG Oral Tablet] 180 tablet 0     Sig: Take 1 tablet by mouth twice daily       Last Appointment Date: 3/25/2021  Next Appointment Date: 7/29/2021    No Known Allergies

## 2021-04-20 DIAGNOSIS — E11.69 TYPE 2 DIABETES MELLITUS WITH OTHER SPECIFIED COMPLICATION, WITHOUT LONG-TERM CURRENT USE OF INSULIN (HCC): Chronic | ICD-10-CM

## 2021-04-20 DIAGNOSIS — I25.10 CORONARY ARTERY DISEASE INVOLVING NATIVE HEART WITHOUT ANGINA PECTORIS, UNSPECIFIED VESSEL OR LESION TYPE: ICD-10-CM

## 2021-04-20 RX ORDER — NITROGLYCERIN 0.4 MG/1
0.4 TABLET SUBLINGUAL EVERY 5 MIN PRN
Qty: 25 TABLET | Refills: 3 | Status: SHIPPED | OUTPATIENT
Start: 2021-04-20

## 2021-04-20 RX ORDER — PREGABALIN 50 MG/1
CAPSULE ORAL
Qty: 30 CAPSULE | Refills: 0 | Status: SHIPPED | OUTPATIENT
Start: 2021-04-20 | End: 2021-05-17

## 2021-04-20 NOTE — TELEPHONE ENCOUNTER
Stephenie Williamson called requesting a refill of the below medication which has been pended for you:     Requested Prescriptions     Pending Prescriptions Disp Refills    pregabalin (LYRICA) 50 MG capsule [Pharmacy Med Name: Pregabalin 50 MG Oral Capsule] 30 capsule 0     Sig: Take 1 capsule by mouth once daily       Last Appointment Date: 3/25/2021  Next Appointment Date: 7/29/2021    No Known Allergies

## 2021-04-24 PROBLEM — Z13.31 SCREENING FOR DEPRESSION: Status: RESOLVED | Noted: 2021-03-25 | Resolved: 2021-04-24

## 2021-05-17 DIAGNOSIS — E11.69 TYPE 2 DIABETES MELLITUS WITH OTHER SPECIFIED COMPLICATION, WITHOUT LONG-TERM CURRENT USE OF INSULIN (HCC): Chronic | ICD-10-CM

## 2021-05-17 RX ORDER — PREGABALIN 50 MG/1
CAPSULE ORAL
Qty: 30 CAPSULE | Refills: 0 | Status: SHIPPED | OUTPATIENT
Start: 2021-05-17 | End: 2021-06-21

## 2021-05-17 RX ORDER — SPIRONOLACTONE 25 MG/1
TABLET ORAL
Qty: 90 TABLET | Refills: 3 | Status: SHIPPED | OUTPATIENT
Start: 2021-05-17 | End: 2021-08-17

## 2021-05-17 RX ORDER — SIMVASTATIN 40 MG
TABLET ORAL
Qty: 90 TABLET | Refills: 1 | Status: SHIPPED | OUTPATIENT
Start: 2021-05-17

## 2021-06-08 RX ORDER — METOPROLOL TARTRATE 50 MG/1
TABLET, FILM COATED ORAL
Qty: 180 TABLET | Refills: 2 | Status: SHIPPED | OUTPATIENT
Start: 2021-06-08

## 2021-06-18 DIAGNOSIS — E11.69 TYPE 2 DIABETES MELLITUS WITH OTHER SPECIFIED COMPLICATION, WITHOUT LONG-TERM CURRENT USE OF INSULIN (HCC): Chronic | ICD-10-CM

## 2021-06-18 NOTE — TELEPHONE ENCOUNTER
Marbin January called requesting a refill of the below medication which has been pended for you:     Requested Prescriptions     Pending Prescriptions Disp Refills    pregabalin (LYRICA) 50 MG capsule [Pharmacy Med Name: Pregabalin 50 MG Oral Capsule] 30 capsule 0     Sig: Take 1 capsule by mouth once daily       Last Appointment Date: 3/25/2021  Next Appointment Date: 7/29/2021    No Known Allergies

## 2021-06-21 RX ORDER — PREGABALIN 50 MG/1
CAPSULE ORAL
Qty: 30 CAPSULE | Refills: 0 | Status: SHIPPED | OUTPATIENT
Start: 2021-06-21 | End: 2021-07-22

## 2021-06-22 RX ORDER — SYRINGE AND NEEDLE,INSULIN,1ML 31GX15/64"
SYRINGE, EMPTY DISPOSABLE MISCELLANEOUS
Qty: 200 EACH | Refills: 2 | Status: SHIPPED | OUTPATIENT
Start: 2021-06-22

## 2021-06-22 NOTE — TELEPHONE ENCOUNTER
Melinda Montes called requesting a refill of the below medication which has been pended for you:     Requested Prescriptions     Pending Prescriptions Disp Refills    RELION INSULIN SYRINGE 31G X 15/64\" 1 ML [facility] [Pharmacy Med Name: Stephanie Lynne 1ML/31G/6MM MIS] 200 each 2     Sig: USE  TWICE DAILY AS DIRECTED       Last Appointment Date: 3/25/2021  Next Appointment Date: 7/29/2021    No Known Allergies

## 2021-07-06 RX ORDER — PIOGLITAZONEHYDROCHLORIDE 45 MG/1
TABLET ORAL
Qty: 90 TABLET | Refills: 0 | Status: SHIPPED | OUTPATIENT
Start: 2021-07-06 | End: 2021-10-04

## 2021-07-19 NOTE — TELEPHONE ENCOUNTER
Bhupinder Shah called requesting a refill of the below medication which has been pended for you:     Requested Prescriptions     Pending Prescriptions Disp Refills    metFORMIN (GLUCOPHAGE) 1000 MG tablet [Pharmacy Med Name: metFORMIN HCl 1000 MG Oral Tablet] 180 tablet 0     Sig: Take 1 tablet by mouth twice daily       Last Appointment Date: 3/25/2021  Next Appointment Date: 7/29/2021    No Known Allergies

## 2021-07-21 DIAGNOSIS — E11.69 TYPE 2 DIABETES MELLITUS WITH OTHER SPECIFIED COMPLICATION, WITHOUT LONG-TERM CURRENT USE OF INSULIN (HCC): Chronic | ICD-10-CM

## 2021-07-21 NOTE — TELEPHONE ENCOUNTER
Mackenzie Storm called requesting a refill of the below medication which has been pended for you:     Requested Prescriptions     Pending Prescriptions Disp Refills    pregabalin (LYRICA) 50 MG capsule [Pharmacy Med Name: Pregabalin 50 MG Oral Capsule] 30 capsule 0     Sig: Take 1 capsule by mouth once daily       Last Appointment Date: 3/25/2021  Next Appointment Date: 7/29/2021    No Known Allergies

## 2021-07-22 RX ORDER — PREGABALIN 50 MG/1
CAPSULE ORAL
Qty: 30 CAPSULE | Refills: 0 | Status: SHIPPED | OUTPATIENT
Start: 2021-07-22 | End: 2021-08-16

## 2021-07-27 DIAGNOSIS — Z13.31 SCREENING FOR DEPRESSION: ICD-10-CM

## 2021-07-27 DIAGNOSIS — I10 ESSENTIAL HYPERTENSION: Chronic | ICD-10-CM

## 2021-07-27 DIAGNOSIS — E78.2 MIXED HYPERLIPIDEMIA: Chronic | ICD-10-CM

## 2021-07-27 DIAGNOSIS — Z00.00 ROUTINE GENERAL MEDICAL EXAMINATION AT A HEALTH CARE FACILITY: ICD-10-CM

## 2021-07-27 DIAGNOSIS — E11.69 TYPE 2 DIABETES MELLITUS WITH OTHER SPECIFIED COMPLICATION, WITHOUT LONG-TERM CURRENT USE OF INSULIN (HCC): Chronic | ICD-10-CM

## 2021-07-27 LAB
ALBUMIN SERPL-MCNC: 4.6 G/DL (ref 3.5–5.2)
ALP BLD-CCNC: 65 U/L (ref 40–130)
ALT SERPL-CCNC: 11 U/L (ref 5–41)
ANION GAP SERPL CALCULATED.3IONS-SCNC: 10 MMOL/L (ref 7–19)
AST SERPL-CCNC: 15 U/L (ref 5–40)
BILIRUB SERPL-MCNC: 0.3 MG/DL (ref 0.2–1.2)
BUN BLDV-MCNC: 40 MG/DL (ref 8–23)
CALCIUM SERPL-MCNC: 10 MG/DL (ref 8.8–10.2)
CHLORIDE BLD-SCNC: 96 MMOL/L (ref 98–111)
CHOLESTEROL, TOTAL: 125 MG/DL (ref 160–199)
CO2: 28 MMOL/L (ref 22–29)
CREAT SERPL-MCNC: 1.3 MG/DL (ref 0.5–1.2)
GFR AFRICAN AMERICAN: >59
GFR NON-AFRICAN AMERICAN: 54
GLUCOSE BLD-MCNC: 79 MG/DL (ref 74–109)
HBA1C MFR BLD: 5.5 % (ref 4–6)
HDLC SERPL-MCNC: 31 MG/DL (ref 55–121)
LDL CHOLESTEROL CALCULATED: 57 MG/DL
POTASSIUM SERPL-SCNC: 5.2 MMOL/L (ref 3.5–5)
SODIUM BLD-SCNC: 134 MMOL/L (ref 136–145)
TOTAL PROTEIN: 7.6 G/DL (ref 6.6–8.7)
TRIGL SERPL-MCNC: 186 MG/DL (ref 0–149)

## 2021-07-29 ENCOUNTER — OFFICE VISIT (OUTPATIENT)
Dept: INTERNAL MEDICINE | Age: 71
End: 2021-07-29
Payer: MEDICARE

## 2021-07-29 VITALS
HEIGHT: 72 IN | RESPIRATION RATE: 16 BRPM | WEIGHT: 230.6 LBS | HEART RATE: 76 BPM | SYSTOLIC BLOOD PRESSURE: 122 MMHG | DIASTOLIC BLOOD PRESSURE: 78 MMHG | BODY MASS INDEX: 31.23 KG/M2

## 2021-07-29 DIAGNOSIS — E78.2 MIXED HYPERLIPIDEMIA: Chronic | ICD-10-CM

## 2021-07-29 DIAGNOSIS — I10 ESSENTIAL HYPERTENSION: Chronic | ICD-10-CM

## 2021-07-29 DIAGNOSIS — E11.69 TYPE 2 DIABETES MELLITUS WITH OTHER SPECIFIED COMPLICATION, WITHOUT LONG-TERM CURRENT USE OF INSULIN (HCC): Primary | Chronic | ICD-10-CM

## 2021-07-29 PROCEDURE — 99214 OFFICE O/P EST MOD 30 MIN: CPT | Performed by: INTERNAL MEDICINE

## 2021-07-29 RX ORDER — HYDROCODONE BITARTRATE AND ACETAMINOPHEN 10; 325 MG/1; MG/1
10-325 TABLET ORAL 3 TIMES DAILY PRN
COMMUNITY
Start: 2021-07-03

## 2021-07-29 ASSESSMENT — ENCOUNTER SYMPTOMS: COUGH: 1

## 2021-07-29 NOTE — PROGRESS NOTES
[address] [facility]  [address] [address] [address] [place]rea Ba[place] [address]  Dept: [phone]  Dept Fax: [phone]: [phone]      Visit Date: 2021    Raysa Maurice a 66 y.o. male who presents today for:  Chief Complaint   Patient presents with    Follow-up     4 month    Nasal Congestion    Cough         HPI:     Batool Cavanaugh 66years old in for follow-up on his cholesterol blood pressure and diabetes. He is just getting over summer cold but the most part otherwise is doing okay. He had lab for review.     Past Medical History:   Diagnosis Date    Arthritis     BPH (benign prostatic hyperplasia)     CAD (coronary artery disease)     CABG  ALYSE to LAD, SVG Y-graft to LM 1&2    Cancer ([address]     SKIN CA ON NOSE    COPD ([facility]) [facility]     Diabetes mellitus ([facility]     Hyperlipidemia     PCP manages    Hypertension     LONG TERM ANTICOAGULENT USE     MDRO (multiple drug resistant organisms) resistance     STAPH INFECTION ON PENIS    Palliative care patient 2021    S/P CABG (coronary artery bypass graft) 2015    Tobacco abuse       Past Surgical History:   Procedure Laterality Date    APPENDECTOMY      CARDIAC CATHETERIZATION  01    EF in excesss 60%    CARDIAC CATHETERIZATION  98    EF 45%    CATARACT REMOVAL WITH IMPLANT      CIRCUMCISION      DUE TO STAPH INFECTION    CORONARY ARTERY BYPASS GRAFT  2/27/01    x3, LIMA to LAD,SVG Y graft to 1st & 2nd Obtuse yeimy branches    DIAGNOSTIC CARDIAC CATH LAB PROCEDURE      PTCA      SKIN BIOPSY      SKIN CANCER EXCISION      NOSE       Family History   Problem Relation Age of Onset    Stroke Father        Social History     Tobacco Use    Smoking status: Former Smoker     Packs/day: 1.00     Years: 50.00     Pack years: 50.00     Types: Cigarettes     Quit date: 2019     Years since quittin.7    Smokeless tobacco: Former User Types: Chew   Substance Use Topics    Alcohol use: No      Current Outpatient Medications   Medication Sig Dispense Refill    HYDROcodone-acetaminophen (NORCO)  MG per tablet Take  tablets by mouth 3 times daily as needed.  pregabalin (LYRICA) 50 MG capsule Take 1 capsule by mouth once daily 30 capsule 0    metFORMIN (GLUCOPHAGE) 1000 MG tablet Take 1 tablet by mouth twice daily 180 tablet 0    pioglitazone (ACTOS) 45 MG tablet Take 1 tablet by mouth once daily 90 tablet 0    RELION INSULIN SYRINGE 31G X 15/64\" 1 ML MISC USE  TWICE DAILY AS DIRECTED 200 each 2    metoprolol tartrate (LOPRESSOR) 50 MG tablet Take 1 tablet by mouth twice daily 180 tablet 2    simvastatin (ZOCOR) 40 MG tablet Take 1 tablet by mouth once daily 90 tablet 1    spironolactone (ALDACTONE) 25 MG tablet Take 1 tablet by mouth once daily 90 tablet 3    nitroGLYCERIN (NITROSTAT) 0.4 MG SL tablet Place 1 tablet under the tongue every 5 minutes as needed for Chest pain up to max of 3 total doses.  If no relief after 1 dose, call 911. 25 tablet 3    clopidogrel (PLAVIX) 75 MG tablet Take 1 tablet by mouth once daily 90 tablet 3    ramipril (ALTACE) 5 MG capsule Take 1 capsule by mouth daily 90 capsule 3    furosemide (LASIX) 40 MG tablet Take 1 tablet by mouth daily 90 tablet 3    tamsulosin (FLOMAX) 0.4 MG capsule TAKE 1 CAPSULE BY MOUTH ONCE DAILY AT NIGHT      blood glucose test strips (ASCENSIA AUTODISC VI;ONE TOUCH ULTRA TEST VI) strip USE 1 STRIP TO CHECK GLUCOSE TWICE DAILY AS NEEDED 100 each 0    magnesium (MAGNESIUM-OXIDE) 250 MG TABS tablet Take 250 mg by mouth daily      Cinnamon 500 MG CAPS Take 1,000 mg by mouth daily      ASPIRIN 81 PO Take 81 mg by mouth daily       insulin NPH (HUMULIN N) 100 UNIT/ML injection vial 20 UNITS IN AM AND 10 UNITS IN PM (Patient taking differently: 30 UNITS IN AM AND 20 UNITS IN PM) 1 vial 3    Lancets MISC PT IS INSULIN DEPT AND CAN CHECK TWICE DAILY WHEN NEEDED DX E11.8 100 each 3    Insulin Pen Needle 32G X 4 MM MISC 1 each by Does not apply route daily 100 each 3    Multiple Vitamins-Minerals (THERAPEUTIC MULTIVITAMIN-MINERALS) tablet Take 1 tablet by mouth daily Alive mens 50+      vitamin E 400 UNIT capsule Take 400 Units by mouth daily. No current facility-administered medications for this visit. No Known Allergies      Subjective:     Review of Systems   HENT: Positive for postnasal drip. Respiratory: Positive for cough. Objective:      /78   Pulse 76   Resp 16   Ht 5' 11.5\" (1.816 m)   Wt 230 lb 9.6 oz (104.6 kg)   BMI 31.71 kg/m²    Physical Exam  Constitutional:       General: He is not in acute distress. Appearance: He is well-developed. He is not diaphoretic. HENT:      Head: Normocephalic and atraumatic. Right Ear: External ear normal.      Left Ear: External ear normal.      Nose: Nose normal.      Mouth/Throat:      Pharynx: No oropharyngeal exudate. Eyes:      General: No scleral icterus. Right eye: No discharge. Left eye: No discharge. Conjunctiva/sclera: Conjunctivae normal.      Pupils: Pupils are equal, round, and reactive to light. Neck:      Thyroid: No thyromegaly. Vascular: No JVD. Trachea: No tracheal deviation. Cardiovascular:      Rate and Rhythm: Normal rate and regular rhythm. Heart sounds: Normal heart sounds. No murmur heard. No friction rub. No gallop. Pulmonary:      Effort: Pulmonary effort is normal. No respiratory distress. Breath sounds: Normal breath sounds. No wheezing or rales. Abdominal:      General: Bowel sounds are normal. There is no distension. Palpations: Abdomen is soft. There is no mass. Tenderness: There is no abdominal tenderness. There is no guarding or rebound. Musculoskeletal:         General: No tenderness or deformity. Normal range of motion. Cervical back: Normal range of motion and neck supple. Lymphadenopathy:      Cervical: No cervical adenopathy. Skin:     General: Skin is warm and dry. Coloration: Skin is not pale. Findings: No erythema or rash. Neurological:      Mental Status: He is alert and oriented to person, place, and time. Cranial Nerves: No cranial nerve deficit. Motor: No abnormal muscle tone. Coordination: Coordination normal.      Deep Tendon Reflexes: Reflexes are normal and symmetric. Reflexes normal.   Psychiatric:         Behavior: Behavior normal.         Thought Content: Thought content normal.         Judgment: Judgment normal.          Assessment:      Diagnosis Orders   1. Type 2 diabetes mellitus with other specified complication, without long-term current use of insulin (HCC)  Comprehensive Metabolic Panel    Hemoglobin A1C   2. Mixed hyperlipidemia  Comprehensive Metabolic Panel    Hemoglobin A1C   3. Essential hypertension  Comprehensive Metabolic Panel    Hemoglobin A1C            Plan:     Type 2 diabetes mellitus. He is doing well. His A1c is 5.5 down from 6.5 when he was in in March. He tries to remain compliant with his diet  He is doing a good job. We will continue the same. Hyperlipidemia. His LDL is less than 100. Total is less than 200. Triglycerides look okay and liver looks okay. He will continue with his simvastatin as he is taking it. Essential hypertension. This is well controlled with a blood pressure of 122/78 on his current meds. He will continue as he is doing. The only new thing it shows at this time is a little renal insufficiency and has been working in his garden. I told him to make sure he is keeps himself hydrated because it looks like he may be dehydrated especially out in his heat and he is going to try to increase his fluid intake and we will monitor this again on his next visit in 4 months.     Return in about 4 months (around 11/29/2021) for liver ,lipid check, diabetes check, LAB 1 WEEK BEFORE APPOINTMENT. Patient given educational materials- see patient instructions. Discussed use, benefit, and side effects of prescribedmedications. All patient questions answered. Pt voiced understanding. Reviewedhealth maintenance. Instructed to continue current medications, diet and exercise. Patient agreed with treatment plan. **This report has been created usingvoice recognition software. It may contain minor errors which are inherent in voicerecognition technology. **    Electronically signed by Tomas Oviedo MD on 7/29/2021 at 10:54 AM

## 2021-08-16 DIAGNOSIS — E11.69 TYPE 2 DIABETES MELLITUS WITH OTHER SPECIFIED COMPLICATION, WITHOUT LONG-TERM CURRENT USE OF INSULIN (HCC): Chronic | ICD-10-CM

## 2021-08-16 NOTE — TELEPHONE ENCOUNTER
Lisa Ibanez called to request a refill on his medication. Last office visit : 7/29/2021   Next office visit : 12/3/2021     Last UDS: No results found for: Kayla Pen, LABBENZ, BUPRENUR, COCAIMETSCRU, GABAPENTIN, MDMA, METAMPU, OPIATESCREENURINE, OXTCOSU, PHENCYCLIDINESCREENURINE, PROPOXYPHENE, THCSCREENUR, TRICYUR    Last Guru: 8/16/21  Medication Contract:   Last Fill: 7/22/21    Requested Prescriptions     Pending Prescriptions Disp Refills    pregabalin (LYRICA) 50 MG capsule [Pharmacy Med Name: Pregabalin 50 MG Oral Capsule] 30 capsule 0     Sig: Take 1 capsule by mouth once daily         Please approve or refuse this medication.    Ila Boyer MA

## 2021-08-17 RX ORDER — PREGABALIN 50 MG/1
CAPSULE ORAL
Qty: 30 CAPSULE | Refills: 0 | Status: SHIPPED | OUTPATIENT
Start: 2021-08-17 | End: 2021-09-21

## 2021-08-17 RX ORDER — SPIRONOLACTONE 25 MG/1
TABLET ORAL
Qty: 90 TABLET | Refills: 3 | Status: SHIPPED | OUTPATIENT
Start: 2021-08-17

## 2021-08-19 NOTE — TELEPHONE ENCOUNTER
DR MEADE PATIENT    Needs a refill on his tamsuosin.  Please send it to [facility].     Has enough to last 3 or 4 more days..

## 2021-08-19 NOTE — TELEPHONE ENCOUNTER
Sent in thirty day supply of flomax. Pt hasnt been seen in year so gave him enough til his visit then we can send in more

## 2021-09-02 ENCOUNTER — OFFICE VISIT (OUTPATIENT)
Dept: CARDIOLOGY CLINIC | Age: 71
End: 2021-09-02
Payer: MEDICARE

## 2021-09-02 VITALS
HEIGHT: 72 IN | HEART RATE: 54 BPM | BODY MASS INDEX: 31.02 KG/M2 | SYSTOLIC BLOOD PRESSURE: 124 MMHG | OXYGEN SATURATION: 99 % | WEIGHT: 229 LBS | DIASTOLIC BLOOD PRESSURE: 62 MMHG

## 2021-09-02 DIAGNOSIS — Z95.5 HISTORY OF CORONARY ARTERY STENT PLACEMENT: ICD-10-CM

## 2021-09-02 DIAGNOSIS — I25.10 CORONARY ARTERY DISEASE INVOLVING NATIVE CORONARY ARTERY OF NATIVE HEART, UNSPECIFIED WHETHER ANGINA PRESENT: Primary | ICD-10-CM

## 2021-09-02 DIAGNOSIS — Z95.1 S/P CABG (CORONARY ARTERY BYPASS GRAFT): ICD-10-CM

## 2021-09-02 DIAGNOSIS — E78.2 MIXED HYPERLIPIDEMIA: ICD-10-CM

## 2021-09-02 DIAGNOSIS — I10 ESSENTIAL HYPERTENSION: ICD-10-CM

## 2021-09-02 DIAGNOSIS — I50.22 CHRONIC SYSTOLIC (CONGESTIVE) HEART FAILURE (HCC): ICD-10-CM

## 2021-09-02 PROCEDURE — 99214 OFFICE O/P EST MOD 30 MIN: CPT | Performed by: NURSE PRACTITIONER

## 2021-09-02 RX ORDER — ISOSORBIDE MONONITRATE 30 MG/1
30 TABLET, EXTENDED RELEASE ORAL NIGHTLY
Qty: 30 TABLET | Refills: 1 | Status: SHIPPED | OUTPATIENT
Start: 2021-09-02

## 2021-09-02 NOTE — PROGRESS NOTES
[facility], [place]. [address] [facility]  [phone] office  [phone] fax      OFFICE VISIT:  9/2/2021    [address]: 1950  Reason For Visit:  Percy Ayon is a 66 y.o. male who is here for 6 Month Follow-Up (Patient denies any cardiac symptoms or complaints. ) and Coronary Artery Disease    History:   Diagnosis Orders   1. Coronary artery disease involving native coronary artery of native heart, unspecified whether angina present     2. S/P CABG (coronary artery bypass graft)     3. Chronic systolic (congestive) heart failure ([address]     4. Mixed hyperlipidemia     5. Essential hypertension     6. History of coronary artery stent placement       The patient presents today for cardiology follow up. The patient reports occasional when \"I overdue working out in the yard. \"  He gets relief with NTG sl x 1. He reports symptoms are not worsening and really have not changed since cath with stenting on 10/17/19. At that time, he was found to have a patent but attenuated LIMA to mid LAD with antegrade flow and intermediate grade left subclavian artery stenosis; occluded SVG Y graft to OM1 and OM 2 and normal LV ejection fraction. Subsequently, the patient had successful PCI with drug-eluting stent to left main into proximal  circumflex (4.0 x 9 mm resolute and 3.5 x 22 mm resolute integrity stents) and successful PCI to mid circumflex with orbital atherectomy and drug-eluting   stent (2.5 x 26 mm resolute integrity). Dr. Coral Bruno recommendations included smoking cessation and consideration of PCI to RCA if symptomatic. The patient stopped smoking in 2019. BP is well controlled on current regimen. The patient's PCP monitors cholesterol. Subjective  Percy Ayon denies shortness of breath, orthopnea, paroxysmal nocturnal dyspnea, syncope, presyncope, sensed arrhythmia, edema and fatigue.   The patient denies numbness or weakness to suggest cerebrovascular accident or transient ischemic attack. + occasional angina with over exertion. Symptoms not worsening.   Sandip Mckeon has the following history as recorded in Health system:  Patient Active Problem List   Diagnosis Code    CAD (coronary artery disease) I25.10    Type 2 diabetes mellitus ([facility] E11.9    COPD (chronic obstructive pulmonary disease) ([facility] J44.9    Mixed hyperlipidemia E78.2    Essential hypertension I10    LONG TERM ANTICOAGULENT USE     S/P CABG (coronary artery bypass graft) Z95.1    Chronic systolic (congestive) heart failure (HCC) I50.22    Tobacco abuse Z72.0    Hypertrophy of prostate with urinary obstruction N40.1, N13.8    Frequency of urination R35.0    Nocturia R35.1    Left ventricular dysfunction I51.9    Cigarette nicotine dependence in remission F17.211    Acute hypoxemic respiratory failure due to COVID-19 (HCC) U07.1, J96.01    Hypoxemia R09.02    Shortness of breath R06.02    Pneumonia due to COVID-19 virus U07.1, J12.82    Palliative care patient Z51.5     Past Medical History:   Diagnosis Date    Arthritis     BPH (benign prostatic hyperplasia)     CAD (coronary artery disease)     CABG 2001 ALYSE to LAD, SVG Y-graft to LM 1&2    Cancer (HCC)     SKIN CA ON NOSE    COPD (chronic obstructive pulmonary disease) ([facility]     Diabetes mellitus ([facility]     Hyperlipidemia     PCP manages    Hypertension     LONG TERM ANTICOAGULENT USE     MDRO (multiple drug resistant organisms) resistance 2003    STAPH INFECTION ON PENIS    Palliative care patient 01/18/2021    S/P CABG (coronary artery bypass graft) 08/05/2015    Tobacco abuse      Past Surgical History:   Procedure Laterality Date    APPENDECTOMY      CARDIAC CATHETERIZATION  2/26/01    EF in excesss 60%    CARDIAC CATHETERIZATION  7/22/98    EF 45%    CATARACT REMOVAL WITH IMPLANT  2001    CIRCUMCISION      DUE TO STAPH INFECTION    CORONARY ARTERY BYPASS GRAFT 2/27/01    x3, FERNANDO to LAD,[facility]    DIAGNOSTIC CARDIAC CATH LAB PROCEDURE      PTCA      SKIN BIOPSY      SKIN CANCER EXCISION      NOSE     Family History   Problem Relation Age of Onset    Stroke Father      Social History     Tobacco Use    Smoking status: Former Smoker     Packs/day: 1.00     Years: 50.00     Pack years: 50.00     Types: Cigarettes     Quit date: 2019     Years since quittin.8    Smokeless tobacco: Former User     Types: Chew   Substance Use Topics    Alcohol use: No      Current Outpatient Medications   Medication Sig Dispense Refill    pregabalin (LYRICA) 50 MG capsule Take 1 capsule by mouth once daily 30 capsule 0    spironolactone (ALDACTONE) 25 MG tablet Take 1 tablet by mouth once daily 90 tablet 3    HYDROcodone-acetaminophen (NORCO)  MG per tablet Take  tablets by mouth 3 times daily as needed.  metFORMIN (GLUCOPHAGE) 1000 MG tablet Take 1 tablet by mouth twice daily 180 tablet 0    pioglitazone (ACTOS) 45 MG tablet Take 1 tablet by mouth once daily 90 tablet 0    RELION INSULIN SYRINGE 31G X 15/64\" 1 ML MISC USE  TWICE DAILY AS DIRECTED 200 each 2    metoprolol tartrate (LOPRESSOR) 50 MG tablet Take 1 tablet by mouth twice daily 180 tablet 2    simvastatin (ZOCOR) 40 MG tablet Take 1 tablet by mouth once daily 90 tablet 1    nitroGLYCERIN (NITROSTAT) 0.4 MG SL tablet Place 1 tablet under the tongue every 5 minutes as needed for Chest pain up to max of 3 total doses.  If no relief after 1 dose, call 911. 25 tablet 3    clopidogrel (PLAVIX) 75 MG tablet Take 1 tablet by mouth once daily 90 tablet 3    ramipril (ALTACE) 5 MG capsule Take 1 capsule by mouth daily (Patient taking differently: Take 10 mg by mouth daily ) 90 capsule 3    furosemide (LASIX) 40 MG tablet Take 1 tablet by mouth daily 90 tablet 3    tamsulosin (FLOMAX) 0.4 MG capsule TAKE 1 CAPSULE BY MOUTH ONCE DAILY AT NIGHT      blood glucose test strips (ASCENSIA AUTODISC VI;ONE TOUCH ULTRA TEST VI) strip USE 1 STRIP TO CHECK GLUCOSE TWICE DAILY AS NEEDED 100 each 0    magnesium (MAGNESIUM-OXIDE) 250 MG TABS tablet Take 250 mg by mouth daily      Cinnamon 500 MG CAPS Take 1,000 mg by mouth daily      ASPIRIN 81 PO Take 81 mg by mouth daily       insulin NPH (HUMULIN N) 100 UNIT/ML injection vial 20 UNITS IN AM AND 10 UNITS IN PM (Patient taking differently: 30 UNITS IN AM AND 20 UNITS IN PM) 1 vial 3    Lancets MISC PT IS INSULIN DEPT AND CAN CHECK TWICE DAILY WHEN NEEDED   DX E11.8 100 each 3    Insulin Pen Needle 32G X 4 MM MISC 1 each by Does not apply route daily 100 each 3    Multiple Vitamins-Minerals (THERAPEUTIC MULTIVITAMIN-MINERALS) tablet Take 1 tablet by mouth daily Alive mens 50+      vitamin E 400 UNIT capsule Take 400 Units by mouth daily. No current facility-administered medications for this visit. Allergies: Patient has no known allergies. Review of Systems  Constitutional  no appetite change, or unexpected weight change. No fever, chills or diaphoresis. No significant change in activity level or new onset of fatigue. HEENT  no significant rhinorrhea or epistaxis. No tinnitus or significant hearing loss. Eyes  no sudden vision change or amaurosis. No corneal arcus, xantholasma, subconjunctival hemorrhage or discharge. Respiratory  no significant wheezing, stridor, apnea or cough. No dyspnea on exertion or shortness of air. Cardiovascular  no orthopnea or PND. No sensation of sustained arrythmia. No occurrence of slow heart rate. No palpitations. No claudication. + occasional angina with over exertion. Symptoms not worsening. Gastrointestinal  no abdominal swelling or pain. No blood in stool. No severe constipation, diarrhea, nausea, or vomiting. Genitourinary  no dysuria, frequency, or urgency. No flank pain or hematuria. Musculoskeletal  no back pain or myalgia.   No problems with gait.  Extremities - no clubbing, cyanosis or extremity edema. Skin  no color change or rash. No pallor. No new surgical incision. Neurologic  no speech difficulty, facial asymmetry or lateralizing weakness. No seizures, presyncope or syncope. No significant dizziness. Hematologic  no easy bruising or excessive bleeding. Psychiatric  no severe anxiety or insomnia. No confusion. All other review of systems are negative. Objective  Vital Signs - /62   Pulse 54   Ht 6' (1.829 m)   Wt 229 lb (103.9 kg)   SpO2 99%   BMI 31.06 kg/m²   General - Lorenzo Jones is alert, cooperative, and pleasant. Well groomed. No acute distress. Body habitus - Body mass index is 31.06 kg/m². HEENT  Head is normocephalic. No circumoral cyanosis. Dentition is normal.  EYES -   Lids normal without ptosis. No discharge, edema or subconjunctival hemorrhage. Neck - Symmetrical without apparent mass or lymphadenopathy. Respiratory - Normal respiratory effort without use of accessory muscles. Ausculatation reveals vesicular breath sounds without crackles, wheezes, rub or rhonchi. Cardiovascular  No jugular venous distention. Auscultation reveals regular rate and rhythm. No audible clicks, gallop or rub. 1/6 systolic murmur. No lower extremity varicosities. No carotid bruits. Abdominal -  No visible distention, mass or pulsations. Extremities - No clubbing or cyanosis. No statis dermatitis or ulcers. No edema. Musculoskeletal -   No Osler's nodes. No kyphosis or scoliosis. Gait is even and regular without limp or shuffle. Ambulates without assistance. Skin -  Warm and dry; no rash or pallor. No new surgical wound. Neurological - No focal neurological deficits. Thought processes coherent. No apparent tremor. Oriented to person, place and time. Psychiatric -  Appropriate affect and mood. Data reviewed:  10/17/19 cath - Dr. Lianna George  EF 55%   Left main, triple-vessel disease.    Patent but attenuated LIMA to mid LAD with antegrade flow and intermediate  grade left subclavian artery stenosis. Occluded SVG Y graft to OM1 and OM 2. Normal LV ejection fraction. Successful PCI with drug-eluting stent to left main into proximal  circumflex (4.0 x 9 mm resolute and 3.5 x 22 mm resolute integrity stents)   Successful PCI to mid circumflex with orbital atherectomy and drug-eluting  stent (2.5 x 26 mm resolute integrity). Recommendations    Medical management. Smoking cessation. Consideration for PCI to RCA at symptomatic. Signatures   Electronically signed by Roma Rashid MD(Performing Physician) on  10/22/2019 07:25    1/7/17 echo  M-MODE AND 2-DIMENSIONAL ECHOCARDIOGRAM:  1. The aortic root appears normal.  2.  Sclerotic changes of the aortic valve with normal leaflet mobility. 3.  Calcification of the mitral valve annulus with normal leaflet mobility. 4.  The tricuspid leaflets have normal thickness and mobility. 5.  Mild concentric left ventricular hypertrophy with normal left ventricular chamber size. There is mild lateral hypokinesis with an estimated left ventricular ejection fraction of about 45%. The left ventricular ejection  fraction is calculated to be 43%. 6.  Normal right ventricular size and wall motion. 7.  Right atrial enlargement. 8.  Left atrial enlargement. 9.  No pericardial effusion or intracavitary thrombus noted.   COLOR FLOW AND DOPPLER WAVEFORM ANALYSIS:  1.  No definite aortic stenosis or insufficiency noted. 2.  The mitral inflow pattern appeared normal.  There is mild mitral regurgitation. 3.  There is mild tricuspid regurgitation. The right ventricular systolic pressure is elevated and estimated to be approximately 60 mmHg.   IMPRESSION:  1. Mild lateral hypokinesis with a left ventricular ejection fraction of 43%  to 45%. 2.  Biatrial enlargement. 3.  Sclerotic changes of the aortic valve. 4.  Mitral annular calcification.   5.  Mild mitral and tricuspid regurgitation. 6.  Elevated right ventricular systolic pressure of 60 mmHg.  Mar Christine MD    Lab Results   Component Value Date    WBC 3.2 (L) 01/17/2021    HGB 11.1 (L) 01/17/2021    HCT 35.5 (L) 01/17/2021    MCV 97.8 (H) 01/17/2021     01/17/2021     Lab Results   Component Value Date     (L) 07/27/2021    K 5.2 (H) 07/27/2021    CL 96 (L) 07/27/2021    CO2 28 07/27/2021    BUN 40 (H) 07/27/2021    CREATININE 1.3 (H) 07/27/2021    GLUCOSE 79 07/27/2021    CALCIUM 10.0 07/27/2021    PROT 7.6 07/27/2021    LABALBU 4.6 07/27/2021    BILITOT 0.3 07/27/2021    ALKPHOS 65 07/27/2021    AST 15 07/27/2021    ALT 11 07/27/2021    LABGLOM 54 (A) 07/27/2021    GFRAA >59 07/27/2021    GLOB 2.2 01/09/2017       Lab Results   Component Value Date    CHOL 125 (L) 07/27/2021    CHOL 141 (L) 03/19/2021    CHOL 161 09/17/2020     Lab Results   Component Value Date    TRIG 186 (H) 07/27/2021    TRIG 161 (H) 03/19/2021    TRIG 162 (H) 09/17/2020     Lab Results   Component Value Date    HDL 31 (L) 07/27/2021    HDL 31 (L) 03/19/2021    HDL 37 (L) 09/17/2020     Lab Results   Component Value Date    LDLCALC 57 07/27/2021    LDLCALC 78 03/19/2021    LDLCALC 92 09/17/2020       BP Readings from Last 3 Encounters:   09/02/21 124/62   07/29/21 122/78   03/25/21 108/64    Pulse Readings from Last 3 Encounters:   09/02/21 54   07/29/21 76   03/25/21 56        Wt Readings from Last 3 Encounters:   09/02/21 229 lb (103.9 kg)   07/29/21 230 lb 9.6 oz (104.6 kg)   03/25/21 245 lb (111.1 kg)     Assessment/Plan:   Diagnosis Orders   1. Coronary artery disease involving native coronary artery of native heart, unspecified whether angina present     2. S/P CABG (coronary artery bypass graft)     3. Chronic systolic (congestive) heart failure ([address]     4. Mixed hyperlipidemia     5. Essential hypertension     6.  History of coronary artery stent placement       CAD s/p CABG and stenting with stable angina with over exertion. Relief obtained with rest and NTG sl x 1. Current medical management includes Lopressor, Altace, Plavix and ASA. Plan to add Imdur 30 mg (1) tab at bedtime and re assess in 2 weeks. Chronic systolic heart failure -  NYHA class II stage C  GDMT includes Lopressor, Altace, Aldactone and Lasix. Currently euvolemic. Continue same. HTN - normotensive on current regimen. Continue same. Hyperlipidemia - LDL 57. Continue Zocor 40 mg daily. Patient is compliant with medication regimen. Previous cardiac history and records reviewed. Continue current medical management for cardiac related condition. Continue other current medications as directed. Continue to follow up with primary care provider for non cardiac medical problems. If your [place] care provider is [place] [facility] please request that your labs be faxed to this office at [phone]. BP goal 130/80 or less. Call the office with any problems, questions or concerns at [phone]. Cardiology follow up as scheduled in [address] appointments. Educational included in patient instructions. Heart health.       Silke Harris, JULIEN

## 2021-09-02 NOTE — PATIENT INSTRUCTIONS
New instructions for today:  Start Isosorbide 30 mg (1) tab at bedtime to increase blood supply to your heart. If symptoms worsen, go to the emergency room. How to take:  NITROGLYCERIN (Nitrostat) 0.4 mg tablets, sublingual.  Nitroglycerin is in a group of drugs called nitrates. Nitroglycerin dilates (widens) blood vessels, making it easier for blood to flow through them and easier for the heart to pump. Dosing Guidelines for Nitroglycerin Tablets  · At the start of an angina (chest pain) attack, place one tablet under the tongue or between the cheek and gum. Do not swallow or chew the tablet; let it dissolve on its own. If necessary, a second and third tablet may be used, with five minutes between using each tablet. If you use a third tablet and your chest pain continues, it is time to seek immediate medical attention. Call 911 immediately and have someone drive you to the emergency room. You may be having a heart attack or other serious heart problem. · To prevent angina from exercise or stress, use 1 tablet 5 to 10 minutes before the activity. Patient Instructions:  Continue current medications as prescribed. Always keep a current medication list. Bring your medications to every office visit. Continue to follow up with primary care provider for non cardiac medical problems. Call the office with any problems, questions or concerns at [phone]. If you have been asked to keep a blood pressure log, do so for 2 weeks. Call the office to report readings to the triage nurse at [phone]. Follow up with cardiologist as scheduled. The following educational material has been included in this after visit summary for your review: Life simple 7. Heart health. Life simple 7  1) Manage blood pressure - high blood pressure is a major risk factor for heart disease and stroke. Keeping blood pressure in health range reduces strain on your heart, arteries and kidneys.   Blood pressure goal is less than 130/80. 2) Control cholesterol - contributes to plaque, which can clog arteries and lead to heart disease and stroke. When you control your cholesterol you are giving your arteries their best chance to remain clear. It is recommended that you get cholesterol lab work done once a year. 3) Reduce blood sugar - most of the food we eat is turning into glucose or blood sugar that our body uses for energy. Over time, high levels of blood sugar can damage your heart, kidneys, eyes and nerves. 4) Get active - living an active life is one of the most rewarding gifts you can give yourself and those you love. Simply put, daily physical activity increases your length and quality of life. Strive to exercise 15 minutes most days of the week. 5)  Eat better - A healthy diet is one of your best weapons for fighting cardiovascular disease. When you eat a heart healthy diet, you improve your chances for feeling good and staying healthy for life. 6)  Lose weight - when you shed extra fat an unnecessary pounds, you reduce the burden on your hear, lungs, blood vessels and skeleton. You give yourself the gift of active living, you lower your blood pressure and help yourself feel better. 7) Stop smoking - cigarette smokers have a higher risk of developing cardiovascular disease. If  You smoke, quitting is the best thing you can do for your health. Check American Heart Association on line for more information on Life's Simple 7 and tips for healthy living. A Healthy Heart: Care Instructions  Your Care Instructions     Coronary artery disease, also called heart disease, occurs when a substance called plaque builds up in the vessels that supply oxygen-rich blood to your heart muscle. This can narrow the blood vessels and reduce blood flow. A heart attack happens when blood flow is completely blocked. A high-fat diet, smoking, and other factors increase the risk of heart disease.   Your doctor has found that you have a chance of having heart disease. You can do lots of things to keep your heart healthy. It may not be easy, but you can change your diet, exercise more, and quit smoking. These steps really work to lower your chance of heart disease. Follow-up care is a key part of your treatment and safety. Be sure to make and go to all appointments, and call your doctor if you are having problems. It's also a good idea to know your test results and keep a list of the medicines you take. How can you care for yourself at home? Diet  · Use less salt when you cook and eat. This helps lower your blood pressure. Taste food before salting. Add only a little salt when you think you need it. With time, your taste buds will adjust to less salt. · Eat fewer snack items, fast foods, canned soups, and other high-salt, high-fat, processed foods. · Read food labels and try to avoid saturated and trans fats. They increase your risk of heart disease by raising cholesterol levels. · Limit the amount of solid fat-butter, margarine, and shortening-you eat. Use olive, peanut, or canola oil when you cook. Bake, broil, and steam foods instead of frying them. · Eat a variety of fruit and vegetables every day. Dark green, deep orange, red, or yellow fruits and vegetables are especially good for you. Examples include spinach, carrots, peaches, and berries. · Foods high in fiber can reduce your cholesterol and provide important vitamins and minerals. High-fiber foods include whole-grain cereals and breads, oatmeal, beans, brown rice, citrus fruits, and apples. · Eat lean proteins. Heart-healthy proteins include seafood, lean meats and poultry, eggs, beans, peas, nuts, seeds, and soy products. · Limit drinks and foods with added sugar. These include candy, desserts, and soda pop. Lifestyle changes  · If your doctor recommends it, get more exercise. Walking is a good choice. Bit by bit, increase the amount you walk every day.  Try for at least 30 minutes on most days of the week. You also may want to swim, bike, or do other activities. · Do not smoke. If you need help quitting, talk to your doctor about stop-smoking programs and medicines. These can increase your chances of quitting for good. Quitting smoking may be the most important step you can take to protect your heart. It is never too late to quit. · Limit alcohol to 2 drinks a day for men and 1 drink a day for women. Too much alcohol can cause health problems. · Manage other health problems such as diabetes, high blood pressure, and high cholesterol. If you think you may have a problem with alcohol or drug use, talk to your doctor. Medicines  · Take your medicines exactly as prescribed. Call your doctor if you think you are having a problem with your medicine. · If your doctor recommends aspirin, take the amount directed each day. Make sure you take aspirin and not another kind of pain reliever, such as acetaminophen (Tylenol). When should you call for help? [id number] if you have symptoms of a heart attack. These may include:  · Chest pain or pressure, or a strange feeling in the chest.  · Sweating. · Shortness of breath. · Pain, pressure, or a strange feeling in the back, neck, jaw, or upper belly or in one or both shoulders or arms. · Lightheadedness or sudden weakness. · A fast or irregular heartbeat. After you call 911, the  may tell you to chew 1 adult-strength or 2 to 4 low-dose aspirin. Wait for an ambulance. Do not try to drive yourself. Watch closely for changes in your health, and be sure to contact your doctor if you have any problems. Where can you learn more? Go to https://[employer] Ro[employer]Local[employer]. org and sign in to your [employer] account. Enter M861 in the Sensee box to learn more about \"A Healthy Heart: Care Instructions. \"     If you do not have an account, please click on the \"Sign Up Now\" link.   Current as of: December 16, 2019 Content Version: 12.5  © 5598-5353 Healthwise, Incorporated. Care instructions adapted under license by [facility] [place] ([facility]). If you have questions about a medical condition or this instruction, always ask your healthcare professional. [address] any warranty or liability for your use of this information.

## 2021-09-08 NOTE — PROGRESS NOTES
Subjective    Mr. Montiel is 71 y.o. male    Chief Complaint: BPH    History of Present Illness    71-year-old male established patient annual follow-up for lower urinary tract symptoms.  LUTS are well controlled on tamsulosin.  We ceased PSA testing last year given his advanced age and normal PSA last August of 1.42 per AUA guidelines.  UA today is clear.       The following portions of the patient's history were reviewed and updated as appropriate: allergies, current medications, past family history, past medical history, past social history, past surgical history and problem list.    Review of Systems   Constitutional: Negative for chills and fever.   Gastrointestinal: Negative for abdominal pain, anal bleeding and blood in stool.   Genitourinary: Negative for dysuria and hematuria.         Current Outpatient Medications:   •  aspirin 325 MG tablet, Take  by mouth., Disp: , Rfl:   •  furosemide (LASIX) 40 MG tablet, Take  by mouth., Disp: , Rfl:   •  glipiZIDE (GLUCOTROL) 10 MG 24 hr tablet, Take  by mouth., Disp: , Rfl:   •  glucose blood test strip, As needed.  PT IS INSULIN DEPT AND CAN CK TWICE DAILY IF NEEDED   DX E11.9, Disp: , Rfl:   •  HYDROcodone-acetaminophen (NORCO)  MG per tablet, Take  tablets by mouth., Disp: , Rfl:   •  Insulin Glargine (LANTUS SOLOSTAR) 100 UNIT/ML injection pen, Inject  under the skin., Disp: , Rfl:   •  insulin NPH (humuLIN N,novoLIN N) 100 UNIT/ML injection, 20 UNITS IN AM AND 10 UNITS IN PM, Disp: , Rfl:   •  Insulin Pen Needle 32G X 4 MM misc, , Disp: , Rfl:   •  Insulin Syringes, Disposable, U-100 1 ML misc, , Disp: , Rfl:   •  isosorbide mononitrate (IMDUR) 30 MG 24 hr tablet, Take 30 mg by mouth., Disp: , Rfl:   •  metFORMIN (GLUCOPHAGE) 1000 MG tablet, Take  by mouth., Disp: , Rfl:   •  metoprolol tartrate (LOPRESSOR) 50 MG tablet, Take  by mouth., Disp: , Rfl:   •  nitroglycerin (NITROSTAT) 0.4 MG SL tablet, Place  under the tongue Every 5 (Five) Minutes.,  "Disp: , Rfl:   •  pioglitazone (ACTOS) 45 MG tablet, Take  by mouth., Disp: , Rfl:   •  pregabalin (LYRICA) 50 MG capsule, Take 1 capsule by mouth once daily, Disp: , Rfl:   •  ramipril (ALTACE) 5 MG capsule, Take  by mouth., Disp: , Rfl:   •  simvastatin (ZOCOR) 40 MG tablet, Take  by mouth., Disp: , Rfl:   •  spironolactone (ALDACTONE) 25 MG tablet, Take  by mouth., Disp: , Rfl:   •  tamsulosin (FLOMAX) 0.4 MG capsule 24 hr capsule, Take 1 capsule by mouth Every Night., Disp: 90 capsule, Rfl: 3  •  therapeutic multivitamin-minerals (THERAGRAN-M) tablet, Take  by mouth., Disp: , Rfl:   •  vitamin E 400 UNIT capsule, Take  by mouth., Disp: , Rfl:   •  clopidogrel (PLAVIX) 75 MG tablet, Take 75 mg by mouth Daily., Disp: , Rfl:     Past Medical History:   Diagnosis Date   • Cancer (CMS/HCC)     spot on nose   • Diabetes mellitus (CMS/HCC)    • Heart problem    • Hyperlipidemia    • Hypertension        Past Surgical History:   Procedure Laterality Date   • APPENDECTOMY     • CORONARY ARTERY BYPASS GRAFT     • NOSE SURGERY      cancer removed   • PENIS SURGERY      staph infection       Social History     Socioeconomic History   • Marital status: Single     Spouse name: Not on file   • Number of children: Not on file   • Years of education: Not on file   • Highest education level: Not on file   Tobacco Use   • Smoking status: Former Smoker     Packs/day: 1.00     Types: Cigarettes   • Smokeless tobacco: Never Used   • Tobacco comment: QUIT IN NOV OF 2019   Vaping Use   • Vaping Use: Never used   Substance and Sexual Activity   • Alcohol use: No   • Sexual activity: Defer       Family History   Problem Relation Age of Onset   • No Known Problems Father    • No Known Problems Mother        Objective    Temp 96.9 °F (36.1 °C)   Ht 180.3 cm (71\")   Wt 103 kg (228 lb)   BMI 31.80 kg/m²     Physical Exam        Results for orders placed or performed in visit on 09/09/21   POC Urinalysis Dipstick, Multipro    Specimen: " Urine   Result Value Ref Range    Color Yellow Yellow, Straw, Dark Yellow, Selina    Clarity, UA Clear Clear    Glucose, UA Negative Negative, 1000 mg/dL (3+) mg/dL    Bilirubin Negative Negative    Ketones, UA Negative Negative    Specific Gravity  1.010 1.005 - 1.030    Blood, UA Negative Negative    pH, Urine 5.5 5.0 - 8.0    Protein, POC Negative Negative mg/dL    Urobilinogen, UA Normal Normal    Nitrite, UA Negative Negative    Leukocytes Negative Negative         Bladder Scan interpretation  Estimation of residual urine via abdominal ultrasound  Residual Urine: 62ml  Indication: BPH  Position: Supine  Examination: Incremental scanning of the suprapubic area using 3 MHz transducer using copious amounts of acoustic gel.   Findings: An anechoic area was demonstrated which represented the bladder, with measurement of residual urine as noted. I inspected this myself. In that the residual urine was stable or insignificant, no treatment will be necessary at this time.       Assessment and Plan    Diagnoses and all orders for this visit:    1. BPH with urinary obstruction (Primary)  -     POC Urinalysis Dipstick, Multipro    2. Slowing of urinary stream  -     tamsulosin (FLOMAX) 0.4 MG capsule 24 hr capsule; Take 1 capsule by mouth Every Night.  Dispense: 90 capsule; Refill: 3      Normal PVR and UA.  Continue tamsulosin.  Will follow up with me in 1 year or sooner as needed.      This document has been signed by KATIUSKA Concepcion MD on September 10, 2021 17:35 CDT

## 2021-09-16 ENCOUNTER — VIRTUAL VISIT (OUTPATIENT)
Dept: CARDIOLOGY CLINIC | Age: 71
End: 2021-09-16
Payer: MEDICARE

## 2021-09-16 DIAGNOSIS — I10 ESSENTIAL HYPERTENSION: Chronic | ICD-10-CM

## 2021-09-16 DIAGNOSIS — I50.22 CHRONIC SYSTOLIC (CONGESTIVE) HEART FAILURE (HCC): ICD-10-CM

## 2021-09-16 DIAGNOSIS — I25.10 CORONARY ARTERY DISEASE INVOLVING NATIVE CORONARY ARTERY OF NATIVE HEART, UNSPECIFIED WHETHER ANGINA PRESENT: Primary | ICD-10-CM

## 2021-09-16 DIAGNOSIS — Z95.1 S/P CABG (CORONARY ARTERY BYPASS GRAFT): ICD-10-CM

## 2021-09-16 DIAGNOSIS — E78.2 MIXED HYPERLIPIDEMIA: Chronic | ICD-10-CM

## 2021-09-16 PROCEDURE — 99441 PR PHYS/QHP TELEPHONE EVALUATION 5-10 MIN: CPT | Performed by: NURSE PRACTITIONER

## 2021-09-16 NOTE — PATIENT INSTRUCTIONS
New instructions for today:  Continue isosorbide. If symptoms worsen, go to the emergency room. How to take:  NITROGLYCERIN (Nitrostat) 0.4 mg tablets, sublingual.  Nitroglycerin is in a group of drugs called nitrates. Nitroglycerin dilates (widens) blood vessels, making it easier for blood to flow through them and easier for the heart to pump. Dosing Guidelines for Nitroglycerin Tablets  · At the start of an angina (chest pain) attack, place one tablet under the tongue or between the cheek and gum. Do not swallow or chew the tablet; let it dissolve on its own. If necessary, a second and third tablet may be used, with five minutes between using each tablet. If you use a third tablet and your chest pain continues, it is time to seek immediate medical attention. Call 911 immediately and have someone drive you to the emergency room. You may be having a heart attack or other serious heart problem. · To prevent angina from exercise or stress, use 1 tablet 5 to 10 minutes before the activity. Patient Instructions:  Continue current medications as prescribed. Always keep a current medication list. Bring your medications to every office visit. Continue to follow up with primary care provider for non cardiac medical problems. Call the office with any problems, questions or concerns at [phone]. If you have been asked to keep a blood pressure log, do so for 2 weeks. Call the office to report readings to the triage nurse at [phone]. Follow up with cardiologist as scheduled. One month phone check. The following educational material has been included in this after visit summary for your review: Life simple 7. Heart health. Life simple 7  1) Manage blood pressure - high blood pressure is a major risk factor for heart disease and stroke. Keeping blood pressure in health range reduces strain on your heart, arteries and kidneys. Blood pressure goal is less than 130/80.    2) Control cholesterol - contributes to plaque, which can clog arteries and lead to heart disease and stroke. When you control your cholesterol you are giving your arteries their best chance to remain clear. It is recommended that you get cholesterol lab work done once a year. 3) Reduce blood sugar - most of the food we eat is turning into glucose or blood sugar that our body uses for energy. Over time, high levels of blood sugar can damage your heart, kidneys, eyes and nerves. 4) Get active - living an active life is one of the most rewarding gifts you can give yourself and those you love. Simply put, daily physical activity increases your length and quality of life. Strive to exercise 15 minutes most days of the week. 5)  Eat better - A healthy diet is one of your best weapons for fighting cardiovascular disease. When you eat a heart healthy diet, you improve your chances for feeling good and staying healthy for life. 6)  Lose weight - when you shed extra fat an unnecessary pounds, you reduce the burden on your hear, lungs, blood vessels and skeleton. You give yourself the gift of active living, you lower your blood pressure and help yourself feel better. 7) Stop smoking - cigarette smokers have a higher risk of developing cardiovascular disease. If  You smoke, quitting is the best thing you can do for your health. Check American Heart Association on line for more information on Life's Simple 7 and tips for healthy living. A Healthy Heart: Care Instructions  Your Care Instructions     Coronary artery disease, also called heart disease, occurs when a substance called plaque builds up in the vessels that supply oxygen-rich blood to your heart muscle. This can narrow the blood vessels and reduce blood flow. A heart attack happens when blood flow is completely blocked. A high-fat diet, smoking, and other factors increase the risk of heart disease.   Your doctor has found that you have a chance of having heart disease. You can do lots of things to keep your heart healthy. It may not be easy, but you can change your diet, exercise more, and quit smoking. These steps really work to lower your chance of heart disease. Follow-up care is a key part of your treatment and safety. Be sure to make and go to all appointments, and call your doctor if you are having problems. It's also a good idea to know your test results and keep a list of the medicines you take. How can you care for yourself at home? Diet  · Use less salt when you cook and eat. This helps lower your blood pressure. Taste food before salting. Add only a little salt when you think you need it. With time, your taste buds will adjust to less salt. · Eat fewer snack items, fast foods, canned soups, and other high-salt, high-fat, processed foods. · Read food labels and try to avoid saturated and trans fats. They increase your risk of heart disease by raising cholesterol levels. · Limit the amount of solid fat-butter, margarine, and shortening-you eat. Use olive, peanut, or canola oil when you cook. Bake, broil, and steam foods instead of frying them. · Eat a variety of fruit and vegetables every day. Dark green, deep orange, red, or yellow fruits and vegetables are especially good for you. Examples include spinach, carrots, peaches, and berries. · Foods high in fiber can reduce your cholesterol and provide important vitamins and minerals. High-fiber foods include whole-grain cereals and breads, oatmeal, beans, brown rice, citrus fruits, and apples. · Eat lean proteins. Heart-healthy proteins include seafood, lean meats and poultry, eggs, beans, peas, nuts, seeds, and soy products. · Limit drinks and foods with added sugar. These include candy, desserts, and soda pop. Lifestyle changes  · If your doctor recommends it, get more exercise. Walking is a good choice. Bit by bit, increase the amount you walk every day.  Try for at least 30 minutes on most days of the week. You also may want to swim, bike, or do other activities. · Do not smoke. If you need help quitting, talk to your doctor about stop-smoking programs and medicines. These can increase your chances of quitting for good. Quitting smoking may be the most important step you can take to protect your heart. It is never too late to quit. · Limit alcohol to 2 drinks a day for men and 1 drink a day for women. Too much alcohol can cause health problems. · Manage other health problems such as diabetes, high blood pressure, and high cholesterol. If you think you may have a problem with alcohol or drug use, talk to your doctor. Medicines  · Take your medicines exactly as prescribed. Call your doctor if you think you are having a problem with your medicine. · If your doctor recommends aspirin, take the amount directed each day. Make sure you take aspirin and not another kind of pain reliever, such as acetaminophen (Tylenol). When should you call for help? [id number] if you have symptoms of a heart attack. These may include:  · Chest pain or pressure, or a strange feeling in the chest.  · Sweating. · Shortness of breath. · Pain, pressure, or a strange feeling in the back, neck, jaw, or upper belly or in one or both shoulders or arms. · Lightheadedness or sudden weakness. · A fast or irregular heartbeat. After you call 911, the  may tell you to chew 1 adult-strength or 2 to 4 low-dose aspirin. Wait for an ambulance. Do not try to drive yourself. Watch closely for changes in your health, and be sure to contact your doctor if you have any problems. Where can you learn more? Go to https://[employer]. org and sign in to your [employer] account. Enter C872 in the Telx box to learn more about \"A Healthy Heart: Care Instructions. \"     If you do not have an account, please click on the \"Sign Up Now\" link.   Current as of: December 16, 2019               Content Version: 12.5  © 1730-0774 [employer]. Care instructions adapted under license by [facility] ([facility]). If you have questions about a medical condition or this instruction, always ask your healthcare professional. [address] any warranty or liability for your use of this information.

## 2021-09-16 NOTE — PROGRESS NOTES
Kee Pa is a 66 y.o. male evaluated via telephone on 9/16/2021. Consent:  He and/or health care decision maker is aware that that he may receive a bill for this telephone service, depending on his insurance coverage, and has provided verbal consent to proceed: Yes    Documentation:  I communicated with the patient and/or health care decision maker about CAD. Details of this discussion including any medical advice provided: HEART HEALTH. I affirm this is a Patient Initiated Episode with an Established Patient who has not had a related appointment within my department in the past 7 days or scheduled within the next 24 hours. Total Time: minutes: 5-10 minutes    Note: not billable if this call serves to triage the patient into an appointment for the relevant concern    JULIEN Worrell      9/16/2021    Audio Patient Encouter(During [facility] public [facility] emergency)  The telephone encourter was conducted with patient in their residence from [address] with JULIEN Donis; assistance by Luis Eduardo Armijo MA.    HPI:  Kee Pa   Diagnosis Orders   1. Coronary artery disease involving native coronary artery of native heart, unspecified whether angina present     2. S/P CABG (coronary artery bypass graft)     3. Essential hypertension     4. Mixed hyperlipidemia     5. Chronic systolic (congestive) heart failure [facility])       Mr. Yonatan Escobar presents today for follow up telephone visit. The patient was seen last in the office on 9/2/21. He reports some occasional chest pain when overdoing work in the yard. The symptoms reported as stable. On 10/17/19, the patient had cardiac cath showing patent but attenuated LIMA to mid LAD with antegrade flow and intermediate grade left subclavian artery stenosis; occluded SVG Y graft to OM1 and OM 2 and normal LV ejection fraction.   Subsequently, the patient had successful PCI with drug-eluting stent to left main into proximal  circumflex (4.0 x 9 mm resolute and 3.5 x 22 mm resolute integrity stents) and successful PCI to mid circumflex with orbital atherectomy and drug-eluting  stent (2.5 x 26 mm resolute integrity). Dr. Lima Loop recommendations included smoking cessation and consideration of PCI to RCA if symptomatic. Imdur 30 mg (1) tab at bedtime was added after last visit. The patient reports \"that really seemed to help a lot. \"  He reports taking NTG sl a couple of times since starting Imdur. BP is well controlled on current regimen. The patient's PCP monitors cholesterol. SUBJECTIVE:  Dorrene Namibian denies shortness of breath, orthopnea, paroxysmal nocturnal dyspnea, syncope, presyncope, sensed arrhythmia, edema and fatigue. The patient denies numbness or weakness to suggest cerebrovascular accident or transient ischemic attack. + occasional chest pain when overdoing activity. Some improvement after adding Imdur. Review of Systems    Prior to Visit Medications    Medication Sig Taking? Authorizing Provider   isosorbide mononitrate (IMDUR) 30 MG extended release tablet Take 1 tablet by mouth nightly Yes JULIEN Worrell   pregabalin (LYRICA) 50 MG capsule Take 1 capsule by mouth once daily Yes Kang Dee MD   spironolactone (ALDACTONE) 25 MG tablet Take 1 tablet by mouth once daily Yes JULIEN Worrell   HYDROcodone-acetaminophen (NORCO)  MG per tablet Take  tablets by mouth 3 times daily as needed.  Yes Adis Lopes,    metFORMIN (GLUCOPHAGE) 1000 MG tablet Take 1 tablet by mouth twice daily Yes Kang Dee MD   pioglitazone (ACTOS) 45 MG tablet Take 1 tablet by mouth once daily Yes Kang Dee MD   RELION INSULIN SYRINGE 31G X 15/64\" 1 ML MISC USE  TWICE DAILY AS DIRECTED Yes Kang Dee MD   metoprolol tartrate (LOPRESSOR) 50 MG tablet Take 1 tablet by mouth twice daily Yes JULIEN Alan   simvastatin (ZOCOR) 40 MG tablet Take 1 tablet by mouth once daily Yes Kang Dee MD nitroGLYCERIN (NITROSTAT) 0.4 MG SL tablet Place 1 tablet under the tongue every 5 minutes as needed for Chest pain up to max of 3 total doses. If no relief after 1 dose, call 911. Yes JULIEN Sweeney   clopidogrel (PLAVIX) 75 MG tablet Take 1 tablet by mouth once daily Yes JULIEN Connelly   ramipril (ALTACE) 5 MG capsule Take 1 capsule by mouth daily  Patient taking differently: Take 10 mg by mouth daily  Yes JULIEN Connelly   furosemide (LASIX) 40 MG tablet Take 1 tablet by mouth daily Yes JULIEN Connelly   tamsulosin (FLOMAX) 0.4 MG capsule TAKE 1 CAPSULE BY MOUTH ONCE DAILY AT NIGHT Yes Historical Provider, MD   blood glucose test strips (ASCENSIA AUTODISC VI;ONE TOUCH ULTRA TEST VI) strip USE 1 STRIP TO CHECK GLUCOSE TWICE DAILY AS NEEDED Yes Pauline Hernandes MD   magnesium (MAGNESIUM-OXIDE) 250 MG TABS tablet Take 250 mg by mouth daily Yes Historical Provider, MD   Cinnamon 500 MG CAPS Take 1,000 mg by mouth daily Yes Historical Provider, MD   ASPIRIN 81 PO Take 81 mg by mouth daily  Yes Historical Provider, MD   insulin NPH (HUMULIN N) 100 UNIT/ML injection vial 20 UNITS IN AM AND 10 UNITS IN PM  Patient taking differently: 30 UNITS IN AM AND 20 UNITS IN PM Yes Pauline Hernandes MD   Lancets MISC PT IS INSULIN DEPT AND CAN CHECK TWICE DAILY WHEN NEEDED   DX E11.8 Yes Pauline Hernandes MD   Insulin Pen Needle 32G X 4 MM MISC 1 each by Does not apply route daily Yes Pauline Hernandes MD   Multiple Vitamins-Minerals (THERAPEUTIC MULTIVITAMIN-MINERALS) tablet Take 1 tablet by mouth daily Alive mens 52+ Yes Historical Provider, MD   vitamin E 400 UNIT capsule Take 400 Units by mouth daily.    Yes Historical Provider, MD       Social History     Tobacco Use    Smoking status: Former Smoker     Packs/day: 1.00     Years: 50.00     Pack years: 50.00     Types: Cigarettes     Quit date: 2019     Years since quittin.8    Smokeless tobacco: Former User     Types: [address] Vaping Use: Never used   Substance Use Topics    Alcohol use: No    Drug use: No        REVIEW OF SYSTEMS:  Constitutional  no appetite change, or unexpected weight change. No fever, chills or diaphoresis. No significant change in activity level or new onset of fatigue. HEENT  no significant rhinorrhea or epistaxis. No tinnitus or significant hearing loss. Eyes  no sudden vision change or amaurosis. No corneal arcus, xantholasma, subconjunctival hemorrhage or discharge. Respiratory  no significant wheezing, stridor, apnea or cough. No dyspnea on exertion or shortness of air. Cardiovascular  no orthopnea or PND. No sensation of sustained arrythmia. No occurrence of slow heart rate. No palpitations. No claudication. + occasional chest pain when overdoing activity. Some improvement after adding Imdur. Gastrointestinal  no abdominal swelling or pain. No blood in stool. No severe constipation, diarrhea, nausea, or vomiting. Genitourinary  no dysuria, frequency, or urgency. No flank pain or hematuria. Musculoskeletal  no back pain or myalgia. No problems with gait. Extremities - no clubbing, cyanosis or extremity edema. Skin  no color change or rash. No pallor. No new surgical incision. Neurologic  no speech difficulty, facial asymmetry or lateralizing weakness. No seizures, presyncope or syncope. No significant dizziness. Hematologic  no easy bruising or excessive bleeding. Psychiatric  no severe anxiety or insomnia. No confusion. All other review of systems are negative.     DATA REVIEWED:  10/17/19 cath - Dr. Baltazar Man  EF 55%   Left main, triple-vessel disease.   Patent but attenuated LIMA to mid LAD with antegrade flow and intermediate  grade left subclavian artery stenosis.   Occluded SVG Y graft to OM1 and OM 2.   Normal LV ejection fraction.   Successful PCI with drug-eluting stent to left main into proximal  circumflex (4.0 x 9 mm resolute and 3.5 x 22 mm resolute integrity stents)   Successful PCI to mid circumflex with orbital atherectomy and drug-eluting  stent (2.5 x 26 mm resolute integrity).     Recommendations    Medical management.   Smoking cessation.   Consideration for PCI to RCA at symptomatic.    Signatures   Electronically signed by Gilson Rashid MD(Performing Physician) on  10/22/2019 07:25     1/7/17 echo  M-MODE AND 2-DIMENSIONAL ECHOCARDIOGRAM:  1.  The aortic root appears normal.  2.  Sclerotic changes of the aortic valve with normal leaflet mobility. 3.  Calcification of the mitral valve annulus with normal leaflet mobility. 4.  The tricuspid leaflets have normal thickness and mobility. 5.  Mild concentric left ventricular hypertrophy with normal left ventricular chamber size.  There is mild lateral hypokinesis with an estimated left ventricular ejection fraction of about 45%.  The left ventricular ejection  fraction is calculated to be 43%. 6.  Normal right ventricular size and wall motion. 7.  Right atrial enlargement. 8.  Left atrial enlargement. 9.  No pericardial effusion or intracavitary thrombus noted.   COLOR FLOW AND DOPPLER WAVEFORM ANALYSIS:  1.  No definite aortic stenosis or insufficiency noted.    2.  The mitral inflow pattern appeared normal.  There is mild mitral regurgitation. 3. Selam Maddison is mild tricuspid regurgitation.  The right ventricular systolic pressure is elevated and estimated to be approximately 60 mmHg.   IMPRESSION:  1.  Mild lateral hypokinesis with a left ventricular ejection fraction of 43%  to 45%. 2.  Biatrial enlargement. 3.  Sclerotic changes of the aortic valve. 4.  Mitral annular calcification. 5.  Mild mitral and tricuspid regurgitation.   6.  Elevated right ventricular systolic pressure of 60 mmHg.  Abner Suarez MD           Lab Results   Component Value Date     WBC 3.2 (L) 01/17/2021     HGB 11.1 (L) 01/17/2021     HCT 35.5 (L) 01/17/2021     MCV 97.8 (H) 01/17/2021      01/17/2021     Lab Results   Component Value Date      (L) 07/27/2021     K 5.2 (H) 07/27/2021     CL 96 (L) 07/27/2021     CO2 28 07/27/2021     BUN 40 (H) 07/27/2021     CREATININE 1.3 (H) 07/27/2021     GLUCOSE 79 07/27/2021     CALCIUM 10.0 07/27/2021     PROT 7.6 07/27/2021     LABALBU 4.6 07/27/2021     BILITOT 0.3 07/27/2021     ALKPHOS 65 07/27/2021     AST 15 07/27/2021     ALT 11 07/27/2021     LABGLOM 54 (A) 07/27/2021     GFRAA >59 07/27/2021     GLOB 2.2 01/09/2017               Lab Results   Component Value Date     CHOL 125 (L) 07/27/2021     CHOL 141 (L) 03/19/2021     CHOL 161 09/17/2020      Lab Results   Component Value Date     TRIG 186 (H) 07/27/2021     TRIG 161 (H) 03/19/2021     TRIG 162 (H) 09/17/2020            Lab Results   Component Value Date     HDL 31 (L) 07/27/2021     HDL 31 (L) 03/19/2021     HDL 37 (L) 09/17/2020            Lab Results   Component Value Date     LDLCALC 57 07/27/2021     LDLCALC 78 03/19/2021     LDLCALC 92 09/17/2020     ASSESSMENT/PlAN:   Diagnosis Orders   1. Coronary artery disease involving native coronary artery of native heart, unspecified whether angina present     2. S/P CABG (coronary artery bypass graft)     3. Essential hypertension     4. Mixed hyperlipidemia     5. Chronic systolic (congestive) heart failure (HCC)       CAD s/p CABG and stenting with stable angina with over exertion. Relief obtained with rest and NTG sl x 1. Current medical management includes Lopressor, Altace, Plavix and ASA. Added Imdur 30 mg (1) tab 2 weeks ago with much improvement. Discussed option of proceeding with cath. Per Dr. Elba Yung last cath note could consider PCI to RCA with symptoms. The patient reports feeling better with reduced incident of chest pain. Plan for telephone follow up call in one month to re assess symptoms.     Chronic systolic heart failure -  NYHA class II stage C  GDMT includes Lopressor, Altace, Aldactone and Lasix. Currently euvolemic.   Continue same.     HTN - normotensive on current regimen. Continue same.     Hyperlipidemia - LDL 57. Continue Zocor 40 mg daily.     Patient is compliant with medication regimen. Continue current medications as prescribed. BP goal is 130/80 or less. If your primary care provider is outside of the [facility], please request your labs be faxed to this office at [phone]. Continue to follow up with primary care provider for non cardiac medical problems. Call the office with any problems, questions or concerns at [phone]. Follow up with cardiologist as scheduled in [facility] The following educational material has been included in this after visit summary for patient review:  [employer]. NTG DOMENICO Hernandez is a 66 y.o. male being evaluated by a telephone encounter to address concerns as mentioned above. A caregiver was present when appropriate. Due to this being a TeleHealth encounter (During [facility]-13 public health emergency). Pursuant to the emergency declaration under the [address] [place] [address], [address] [place] [address] authority and the [employer] Act, this Virtual Visit was conducted with patient's (and/or legal guardian's) consent, to reduce the patient's risk of exposure to COVID-19 and provide necessary medical care. The patient (and/or legal guardian) has also been advised to contact this office for worsening conditions or problems, and seek emergency medical treatment and/or call 911 if deemed necessary. Services were provided through a telephone discussion virtually to substitute for in-person clinic visit. Patient and provider were located at their individual homes. --JULIEN Matthews on 9/16/2021 at 10:59 AM    An electronic signature was used to authenticate this note.

## 2021-09-21 DIAGNOSIS — E11.69 TYPE 2 DIABETES MELLITUS WITH OTHER SPECIFIED COMPLICATION, WITHOUT LONG-TERM CURRENT USE OF INSULIN (HCC): Chronic | ICD-10-CM

## 2021-09-21 RX ORDER — PREGABALIN 50 MG/1
CAPSULE ORAL
Qty: 30 CAPSULE | Refills: 0 | Status: SHIPPED | OUTPATIENT
Start: 2021-09-21 | End: 2021-10-21

## 2021-09-21 NOTE — TELEPHONE ENCOUNTER
Karen Hollingsworth called requesting a refill of the below medication which has been pended for you:     Requested Prescriptions     Pending Prescriptions Disp Refills    pregabalin (LYRICA) 50 MG capsule [Pharmacy Med Name: Pregabalin 50 MG Oral Capsule] 30 capsule 0     Sig: Take 1 capsule by mouth once daily       Last Appointment Date: 7/29/2021  Next Appointment Date: 12/3/2021    No Known Allergies

## 2021-10-04 RX ORDER — PIOGLITAZONEHYDROCHLORIDE 45 MG/1
TABLET ORAL
Qty: 90 TABLET | Refills: 0 | Status: SHIPPED | OUTPATIENT
Start: 2021-10-04

## 2021-10-04 NOTE — TELEPHONE ENCOUNTER
Last Appointment Date: 7/29/2021  Next Appointment Date: 12/3/2021    No Known Allergies    Patient needs refill on   Requested Prescriptions     Pending Prescriptions Disp Refills    pioglitazone (ACTOS) 45 MG tablet [Pharmacy Med Name: Pioglitazone HCl 45 MG Oral Tablet] 90 tablet 0     Sig: Take 1 tablet by mouth once daily

## 2021-10-18 ENCOUNTER — VIRTUAL VISIT (OUTPATIENT)
Dept: CARDIOLOGY CLINIC | Age: 71
End: 2021-10-18

## 2021-10-18 DIAGNOSIS — I25.10 CORONARY ARTERY DISEASE INVOLVING NATIVE CORONARY ARTERY OF NATIVE HEART, UNSPECIFIED WHETHER ANGINA PRESENT: Primary | ICD-10-CM

## 2021-10-19 ENCOUNTER — VIRTUAL VISIT (OUTPATIENT)
Dept: CARDIOLOGY CLINIC | Age: 71
End: 2021-10-19
Payer: MEDICARE

## 2021-10-19 DIAGNOSIS — E78.2 MIXED HYPERLIPIDEMIA: Chronic | ICD-10-CM

## 2021-10-19 DIAGNOSIS — I10 ESSENTIAL HYPERTENSION: Chronic | ICD-10-CM

## 2021-10-19 DIAGNOSIS — Z95.1 S/P CABG (CORONARY ARTERY BYPASS GRAFT): ICD-10-CM

## 2021-10-19 DIAGNOSIS — I25.10 CORONARY ARTERY DISEASE INVOLVING NATIVE CORONARY ARTERY OF NATIVE HEART WITHOUT ANGINA PECTORIS: Chronic | ICD-10-CM

## 2021-10-19 DIAGNOSIS — I50.22 CHRONIC SYSTOLIC (CONGESTIVE) HEART FAILURE (HCC): Primary | ICD-10-CM

## 2021-10-19 PROCEDURE — 99441 PR PHYS/QHP TELEPHONE EVALUATION 5-10 MIN: CPT | Performed by: NURSE PRACTITIONER

## 2021-10-19 NOTE — PATIENT INSTRUCTIONS
New instructions for today:  Call the office if you have more episodes of low blood pressure. Weigh yourself daily without clothing at the same time each day. Record a daily weight log. Call the office if you gain 3 pounds or more in 2 to 3 days or 5 pounds in one week. A sudden weight gain may mean that your heart failure is getting worse. Sodium causes your body to hold on to extra water. This may cause your heart failure symptoms to get worse. Limit sodium to 2,000 milligrams (mg) a day or less. That is less than 1 teaspoon of salt a day, including all the salt you eat in cooking or in packaged foods. Fluid restriction of 1500ml per day (about 6 cups of fluid per day). Patient Instructions:  Continue current medications as prescribed. Always keep a current medication list. Bring your medications to every office visit. Continue to follow up with primary care provider for non cardiac medical problems. Call the office with any problems, questions or concerns at [phone]. If you have been asked to keep a blood pressure log, do so for 2 weeks. Call the office to report readings to the triage nurse at [phone]. Follow up with cardiologist as scheduled. Dr. Darrel Rodríguez as scheduled. The following educational material has been included in this after visit summary for your review: Life simple 7. Heart health. Avoid heart failure triggers. Life simple 7  1) Manage blood pressure - high blood pressure is a major risk factor for heart disease and stroke. Keeping blood pressure in health range reduces strain on your heart, arteries and kidneys. Blood pressure goal is less than 130/80. 2) Control cholesterol - contributes to plaque, which can clog arteries and lead to heart disease and stroke. When you control your cholesterol you are giving your arteries their best chance to remain clear. It is recommended that you get cholesterol lab work done once a year.   3) Reduce blood sugar - most of the food we eat is turning into glucose or blood sugar that our body uses for energy. Over time, high levels of blood sugar can damage your heart, kidneys, eyes and nerves. 4) Get active - living an active life is one of the most rewarding gifts you can give yourself and those you love. Simply put, daily physical activity increases your length and quality of life. Strive to exercise 15 minutes most days of the week. 5)  Eat better - A healthy diet is one of your best weapons for fighting cardiovascular disease. When you eat a heart healthy diet, you improve your chances for feeling good and staying healthy for life. 6)  Lose weight - when you shed extra fat an unnecessary pounds, you reduce the burden on your hear, lungs, blood vessels and skeleton. You give yourself the gift of active living, you lower your blood pressure and help yourself feel better. 7) Stop smoking - cigarette smokers have a higher risk of developing cardiovascular disease. If  You smoke, quitting is the best thing you can do for your health. Check American Heart Association on line for more information on Life's Simple 7 and tips for healthy living. A Healthy Heart: Care Instructions  Your Care Instructions     Coronary artery disease, also called heart disease, occurs when a substance called plaque builds up in the vessels that supply oxygen-rich blood to your heart muscle. This can narrow the blood vessels and reduce blood flow. A heart attack happens when blood flow is completely blocked. A high-fat diet, smoking, and other factors increase the risk of heart disease. Your doctor has found that you have a chance of having heart disease. You can do lots of things to keep your heart healthy. It may not be easy, but you can change your diet, exercise more, and quit smoking. These steps really work to lower your chance of heart disease. Follow-up care is a key part of your treatment and safety.  Be sure to make and go to all appointments, and call your doctor if you are having problems. It's also a good idea to know your test results and keep a list of the medicines you take. How can you care for yourself at home? Diet  · Use less salt when you cook and eat. This helps lower your blood pressure. Taste food before salting. Add only a little salt when you think you need it. With time, your taste buds will adjust to less salt. · Eat fewer snack items, fast foods, canned soups, and other high-salt, high-fat, processed foods. · Read food labels and try to avoid saturated and trans fats. They increase your risk of heart disease by raising cholesterol levels. · Limit the amount of solid fat-butter, margarine, and shortening-you eat. Use olive, peanut, or canola oil when you cook. Bake, broil, and steam foods instead of frying them. · Eat a variety of fruit and vegetables every day. Dark green, deep orange, red, or yellow fruits and vegetables are especially good for you. Examples include spinach, carrots, peaches, and berries. · Foods high in fiber can reduce your cholesterol and provide important vitamins and minerals. High-fiber foods include whole-grain cereals and breads, oatmeal, beans, brown rice, citrus fruits, and apples. · Eat lean proteins. Heart-healthy proteins include seafood, lean meats and poultry, eggs, beans, peas, nuts, seeds, and soy products. · Limit drinks and foods with added sugar. These include candy, desserts, and soda pop. Lifestyle changes  · If your doctor recommends it, get more exercise. Walking is a good choice. Bit by bit, increase the amount you walk every day. Try for at least 30 minutes on most days of the week. You also may want to swim, bike, or do other activities. · Do not smoke. If you need help quitting, talk to your doctor about stop-smoking programs and medicines. These can increase your chances of quitting for good.  Quitting smoking may be the most important step you can take to protect your heart. It is never too late to quit. · Limit alcohol to 2 drinks a day for men and 1 drink a day for women. Too much alcohol can cause health problems. · Manage other health problems such as diabetes, high blood pressure, and high cholesterol. If you think you may have a problem with alcohol or drug use, talk to your doctor. Medicines  · Take your medicines exactly as prescribed. Call your doctor if you think you are having a problem with your medicine. · If your doctor recommends aspirin, take the amount directed each day. Make sure you take aspirin and not another kind of pain reliever, such as acetaminophen (Tylenol). When should you call for help? [id number] if you have symptoms of a heart attack. These may include:  · Chest pain or pressure, or a strange feeling in the chest.  · Sweating. · Shortness of breath. · Pain, pressure, or a strange feeling in the back, neck, jaw, or upper belly or in one or both shoulders or arms. · Lightheadedness or sudden weakness. · A fast or irregular heartbeat. After you call 911, the  may tell you to chew 1 adult-strength or 2 to 4 low-dose aspirin. Wait for an ambulance. Do not try to drive yourself. Watch closely for changes in your health, and be sure to contact your doctor if you have any problems. Where can you learn more? Go to [email or web address] and sign in to your Minglebox account. Enter K109 in the [facility] box to learn more about \"A Healthy Heart: Care Instructions. \"     If you do not have an account, please click on the \"Sign Up Now\" link. Current as of: December 16, 2019               Content Version: 12.5  © 2612-5981 Healthwise, [employer]. Care instructions adapted under license by [facility] ([facility]).  If you have questions about a medical condition or this instruction, always ask your healthcare professional. Elías Erickson any warranty or liability for your use of this much sodium you get in one serving. Check the serving size. If you eat more than one serving, you are getting more sodium. · Be aware that sodium can come in forms other than salt, including monosodium glutamate (MSG), sodium citrate, and sodium bicarbonate (baking soda). MSG is often added to Asian food. You can sometimes ask for food without MSG or salt. · Slowly reducing salt will help you adjust to the taste. Take the salt shaker off the table. · Flavor your food with garlic, lemon juice, onion, vinegar, herbs, and spices instead of salt. Do not use soy sauce, steak sauce, onion salt, garlic salt, mustard, or ketchup on your food, unless it is labeled \"low-sodium\" or \"low-salt. \"  · Make your own salad dressings, sauces, and ketchup without adding salt. · Use fresh or frozen ingredients, instead of canned ones, whenever you can. Choose low-sodium canned goods. · Eat less processed food and food from restaurants, including fast food. Exercise as directed  Moderate, regular exercise is very good for your heart. It improves your blood flow and helps control your weight. But too much exercise can stress your heart and cause a heart failure flare-up. · Check with your doctor before you start an exercise program.  · Walking is an easy way to get exercise. Start out slowly. Gradually increase the length and pace of your walk. Swimming, riding a bike, and using a treadmill are also good forms of exercise. · When you exercise, watch for signs that your heart is working too hard. You are pushing yourself too hard if you cannot talk while you are exercising. If you become short of breath or dizzy or have chest pain, stop, sit down, and rest.  · Do not exercise when you do not feel well. Take medicines correctly  · Take your medicines exactly as prescribed. Call your doctor if you think you are having a problem with your medicine. · Make a list of all the medicines you take.  Include those prescribed to you by other doctors and any over-the-counter medicines, vitamins, or supplements you take. Take this list with you when you go to any doctor. · Take your medicines at the same time every day. It may help you to post a list of all the medicines you take every day and what time of day you take them. · Make taking your medicine as simple as you can. Plan times to take your medicines when you are doing other things, such as eating a meal or getting ready for bed. This will make it easier to remember to take your medicines. · Get organized. Use helpful tools, such as daily or weekly pill containers. When should you call for help? Call 911  if you have symptoms of sudden heart failure such as:    · You have severe trouble breathing.     · You cough up pink, foamy mucus.     · You have a new irregular or rapid heartbeat. Call your doctor now or seek immediate medical care if:    · You have new or increased shortness of breath.     · You are dizzy or lightheaded, or you feel like you may faint.     · You have sudden weight gain, such as more than 2 to 3 pounds in a day or 5 pounds in a week. (Your doctor may suggest a different range of weight gain.)     · You have increased swelling in your legs, ankles, or feet.     · You are suddenly so tired or weak that you cannot do your usual activities. Watch closely for changes in your health, and be sure to contact your doctor if you develop new symptoms. Where can you learn more? Go to https://[employer]iral.[employer]. org and sign in to your T[employer]Base account. Enter U701 in the [employer] box to learn more about \"Avoiding Triggers With Heart Failure: Care Instructions. \"     If you do not have an account, please click on the \"Sign Up Now\" link. Current as of: April 29, 2021               Content Version: 13.0  © 3578-3908 Healthwise, [employer]. Care instructions adapted under license by [facility].  If you have questions about a medical condition or this instruction, always ask your healthcare professional. [place] [address] any warranty or liability for your use of this information.

## 2021-10-19 NOTE — PROGRESS NOTES
Chantell Gallego is a 66 y.o. male evaluated via telephone on 10/19/2021. Consent:  He and/or health care decision maker is aware that that he may receive a bill for this telephone service, depending on his insurance coverage, and has provided verbal consent to proceed: Yes    Documentation:  I communicated with the patient and/or health care decision maker about CAD. Details of this discussion including any medical advice provided: Heart health. I affirm this is a Patient Initiated Episode with an Established Patient who has not had a related appointment within my department in the past 7 days or scheduled within the next 24 hours. Total Time: minutes: 5-10 minutes    Note: not billable if this call serves to triage the patient into an appointment for the relevant concern    JULIEN Reeves      10/19/2021    Audio Patient Encouter(During [address] emergency)  The telephone encourter was conducted with patient in their residence from [address] with JULIEN Herndon; assistance by Huang Zambrano MA.    HPI:  Chantell Gallego   Diagnosis Orders   1. Chronic systolic (congestive) heart failure ([address]     2. Coronary artery disease involving native coronary artery of native heart without angina pectoris     3. S/P CABG (coronary artery bypass graft)     4. Essential hypertension     5. Mixed hyperlipidemia       The patient presents today for follow up phone visit. The patient was seen last in the office on 9/2/21. He reported some occasional chest pain when overdoing work in the yard. The symptoms were reported as stable.   On 10/17/19, the patient had cardiac cath showing patent but attenuated LIMA to mid LAD with antegrade flow and intermediate grade left subclavian artery stenosis; occluded SVG Y graft to OM1 and OM 2 and normal LV ejection fraction.  Subsequently, the patient had successful PCI with drug-eluting stent to left main into proximal  circumflex (4.0 x 9 mm resolute and 3.5 x 22 mm resolute integrity stents) and successful PCI to mid circumflex with orbital atherectomy and drug-eluting  stent (2.5 x 26 mm resolute integrity).  Dr. Rashid's recommendations included smoking cessation and consideration of PCI to RCA if symptomatic. Imdur 30 mg (1) tab at bedtime was added after last visit. The patient reports \"that really seemed to help a lot. \"  Today, Mr. Jacquie Resendez repors no additional chest pain. He continues on Imdur. No report of edema. The patient reports one episode of low BP last week. The patient denies symptoms to suggest myocardial ischemia, heart failure or arrhythmia. BP is overall well controlled on current regimen. The patient's PCP monitors cholesterol. SUBJECTIVE:  Philippe Dunn denies exertional chest pain, shortness of breath, orthopnea, paroxysmal nocturnal dyspnea, syncope, presyncope, sensed arrhythmia, edema and fatigue. The patient denies numbness or weakness to suggest cerebrovascular accident or transient ischemic attack. Review of Systems    Prior to Visit Medications    Medication Sig Taking? Authorizing Provider   pioglitazone (ACTOS) 45 MG tablet Take 1 tablet by mouth once daily Yes Jeremy Wolf MD   pregabalin (LYRICA) 50 MG capsule Take 1 capsule by mouth once daily Yes Jeremy Wolf MD   isosorbide mononitrate (IMDUR) 30 MG extended release tablet Take 1 tablet by mouth nightly Yes JULIEN Li   spironolactone (ALDACTONE) 25 MG tablet Take 1 tablet by mouth once daily Yes JULIEN Li   HYDROcodone-acetaminophen (NORCO)  MG per tablet Take  tablets by mouth 3 times daily as needed.  Yes Adis Lopes,    metFORMIN (GLUCOPHAGE) 1000 MG tablet Take 1 tablet by mouth twice daily Yes Jeremy Wolf MD   RELION INSULIN SYRINGE 31G X 15/64\" 1 ML MISC USE  TWICE DAILY AS DIRECTED Yes Jeremy Wolf MD   metoprolol tartrate (LOPRESSOR) 50 MG tablet Take 1 tablet by mouth twice daily Yes Gus JULIEN Tanner   simvastatin (ZOCOR) 40 MG tablet Take 1 tablet by mouth once daily Yes Milena Chandler MD   nitroGLYCERIN (NITROSTAT) 0.4 MG SL tablet Place 1 tablet under the tongue every 5 minutes as needed for Chest pain up to max of 3 total doses. If no relief after 1 dose, call 911. Yes Gerardine JULIEN Subramanian   clopidogrel (PLAVIX) 75 MG tablet Take 1 tablet by mouth once daily Yes JULIEN Park   ramipril (ALTACE) 5 MG capsule Take 1 capsule by mouth daily Yes JULIEN Park   furosemide (LASIX) 40 MG tablet Take 1 tablet by mouth daily Yes JULIEN Park   tamsulosin (FLOMAX) 0.4 MG capsule TAKE 1 CAPSULE BY MOUTH ONCE DAILY AT NIGHT Yes Historical Provider, MD   blood glucose test strips (ASCENSIA AUTODISC VI;ONE TOUCH ULTRA TEST VI) strip USE 1 STRIP TO CHECK GLUCOSE TWICE DAILY AS NEEDED Yes Milena Chandler MD   magnesium (MAGNESIUM-OXIDE) 250 MG TABS tablet Take 250 mg by mouth daily Yes Historical Provider, MD   Cinnamon 500 MG CAPS Take 1,000 mg by mouth daily Yes Historical Provider, MD   ASPIRIN 81 PO Take 81 mg by mouth daily  Yes Historical Provider, MD   insulin NPH (HUMULIN N) 100 UNIT/ML injection vial 20 UNITS IN AM AND 10 UNITS IN PM  Patient taking differently: 30 UNITS IN AM AND 20 UNITS IN PM Yes Milena Chandler MD   Lancets MISC PT IS INSULIN DEPT AND CAN CHECK TWICE DAILY WHEN NEEDED   DX E11.8 Yes Milena Chandler MD   Insulin Pen Needle 32G X 4 MM MISC 1 each by Does not apply route daily Yes Milena Chandler MD   Multiple Vitamins-Minerals (THERAPEUTIC MULTIVITAMIN-MINERALS) tablet Take 1 tablet by mouth daily Alive mens 52+ Yes Historical Provider, MD   vitamin E 400 UNIT capsule Take 400 Units by mouth daily.    Yes Historical Provider, MD       Social History     Tobacco Use    Smoking status: Former Smoker     Packs/day: 1.00     Years: 50.00     Pack years: 50.00     Types: Cigarettes     Quit date: 2019     Years since quittin.9    Smokeless tobacco: Former User     Types: Chew   Vaping Use    Vaping Use: Never used   Substance Use Topics    Alcohol use: No    Drug use: No        REVIEW OF SYSTEMS:  Constitutional  no appetite change, or unexpected weight change. No fever, chills or diaphoresis. No significant change in activity level or new onset of fatigue. HEENT  no significant rhinorrhea or epistaxis. No tinnitus or significant hearing loss. Eyes  no sudden vision change or amaurosis. No corneal arcus, xantholasma, subconjunctival hemorrhage or discharge. Respiratory  no significant wheezing, stridor, apnea or cough. No dyspnea on exertion or shortness of air. Cardiovascular  no exertional chest pain to suggest myocardial ischemia. No orthopnea or PND. No sensation of sustained arrythmia. No occurrence of slow heart rate. No palpitations. No claudication. Gastrointestinal  no abdominal swelling or pain. No blood in stool. No severe constipation, diarrhea, nausea, or vomiting. Genitourinary  no dysuria, frequency, or urgency. No flank pain or hematuria. Musculoskeletal  no back pain or myalgia. No problems with gait. Extremities - no clubbing, cyanosis or extremity edema. Skin  no color change or rash. No pallor. No new surgical incision. Neurologic  no speech difficulty, facial asymmetry or lateralizing weakness. No seizures, presyncope or syncope. No significant dizziness. Hematologic  no easy bruising or excessive bleeding. Psychiatric  no severe anxiety or insomnia. No confusion. All other review of systems are negative.     DATA REVIEWED:  10/17/19 cath - Dr. Nita Duque  EF 55%   Left main, triple-vessel disease.   Patent but attenuated LIMA to mid LAD with antegrade flow and intermediate  grade left subclavian artery stenosis.   Occluded SVG Y graft to OM1 and OM 2.   Normal LV ejection fraction.   Successful PCI with drug-eluting stent to left main into proximal  circumflex (4.0 x 9 mm resolute and 3.5 x 22 mm resolute integrity stents)   Successful PCI to mid circumflex with orbital atherectomy and drug-eluting  stent (2.5 x 26 mm resolute integrity).     Recommendations    Medical management.   Smoking cessation.   Consideration for PCI to RCA at symptomatic.    Signatures   Electronically signed by Radha Rashid MD(Performing Physician) on  10/22/2019 07:25     1/7/17 echo  M-MODE AND 2-DIMENSIONAL ECHOCARDIOGRAM:  1.  The aortic root appears normal.  2.  Sclerotic changes of the aortic valve with normal leaflet mobility. 3.  Calcification of the mitral valve annulus with normal leaflet mobility. 4.  The tricuspid leaflets have normal thickness and mobility. 5.  Mild concentric left ventricular hypertrophy with normal left ventricular chamber size.  There is mild lateral hypokinesis with an estimated left ventricular ejection fraction of about 45%.  The left ventricular ejection  fraction is calculated to be 43%. 6.  Normal right ventricular size and wall motion. 7.  Right atrial enlargement. 8.  Left atrial enlargement. 9.  No pericardial effusion or intracavitary thrombus noted.   COLOR FLOW AND DOPPLER WAVEFORM ANALYSIS:  1.  No definite aortic stenosis or insufficiency noted.    2.  The mitral inflow pattern appeared normal.  There is mild mitral regurgitation. 3. Calleen Pitcher is mild tricuspid regurgitation.  The right ventricular systolic pressure is elevated and estimated to be approximately 60 mmHg.   IMPRESSION:  1.  Mild lateral hypokinesis with a left ventricular ejection fraction of 43%  to 45%. 2.  Biatrial enlargement. 3.  Sclerotic changes of the aortic valve. 4.  Mitral annular calcification. 5.  Mild mitral and tricuspid regurgitation.   6.  Elevated right ventricular systolic pressure of 60 mmHg.  Toni Do MD               Lab Results   Component Value Date     WBC 3.2 (L) 01/17/2021     HGB 11.1 (L) 01/17/2021     HCT 35.5 (L) 01/17/2021     MCV 97.8 (H) 01/17/2021      01/17/2021                Lab Results   Component Value Date      (L) 07/27/2021     K 5.2 (H) 07/27/2021     CL 96 (L) 07/27/2021     CO2 28 07/27/2021     BUN 40 (H) 07/27/2021     CREATININE 1.3 (H) 07/27/2021     GLUCOSE 79 07/27/2021     CALCIUM 10.0 07/27/2021     PROT 7.6 07/27/2021     LABALBU 4.6 07/27/2021     BILITOT 0.3 07/27/2021     ALKPHOS 65 07/27/2021     AST 15 07/27/2021     ALT 11 07/27/2021     LABGLOM 54 (A) 07/27/2021     GFRAA >59 07/27/2021     GLOB 2.2 01/09/2017                   Lab Results   Component Value Date     CHOL 125 (L) 07/27/2021     CHOL 141 (L) 03/19/2021     CHOL 161 09/17/2020            Lab Results   Component Value Date     TRIG 186 (H) 07/27/2021     TRIG 161 (H) 03/19/2021     TRIG 162 (H) 09/17/2020                Lab Results   Component Value Date     HDL 31 (L) 07/27/2021     HDL 31 (L) 03/19/2021     HDL 37 (L) 09/17/2020                Lab Results   Component Value Date     LDLCALC 57 07/27/2021     LDLCALC 78 03/19/2021     LDLCALC 92 09/17/2020       ASSESSMENT/PlAN:   Diagnosis Orders   1. Chronic systolic (congestive) heart failure ([address]     2. Coronary artery disease involving native coronary artery of native heart without angina pectoris     3. S/P CABG (coronary artery bypass graft)     4. Essential hypertension     5. Mixed hyperlipidemia     . CAD s/p CABG and stenting - reporting no angina over the past 2 weeks after adding Imdur. Current medical management includes Lopressor, Altace, Imdur, Plavix and ASA. Continue same.     Chronic systolic heart failure -  NYHA class II stage C  GDMT includes Lopressor, Altace, Aldactone and Lasix. Currently euvolemic.  Continue same.     HTN - normotensive on current regimen. Continue same.     Hyperlipidemia - LDL 57. Continue Zocor 40 mg daily.     Patient is compliant with medication regimen.     Continue current medical management for cardiac condition.   Continue current medications as prescribed. BP goal is 130/80 or less. If your primary care provider is outside of the [facility], please request your labs be faxed to this office at [phone]. Continue to follow up with primary care provider for non cardiac medical problems. Call the office with any problems, questions or concerns at [phone]. Follow up with cardiologist as scheduled in [address] [place] [address] Rd. Dr. Sandy Cha as scheduled. The following educational material has been included in this after visit summary for patient review:  Heart health. NTG SL.    Dara Bowden is a 66 y.o. male being evaluated by a telephone encounter to address concerns as mentioned above. A caregiver was present when appropriate. Due to this being a TeleHealth encounter ([facility]). Pursuant to the emergency declaration under the [address] [place] [address], [address] [place] [address] authority and the [employer] Act, this Virtual Visit was conducted with patient's (and/or legal guardian's) consent, to reduce the patient's risk of exposure to COVID-19 and provide necessary medical care. The patient (and/or legal guardian) has also been advised to contact this office for worsening conditions or problems, and seek emergency medical treatment and/or call 911 if deemed necessary. Services were provided through a telephone discussion virtually to substitute for in-person clinic visit. Patient and provider were located at their individual homes. --JULIEN Leon on 10/19/2021 at 2:06 PM    An electronic signature was used to authenticate this note.

## 2021-10-20 DIAGNOSIS — E11.69 TYPE 2 DIABETES MELLITUS WITH OTHER SPECIFIED COMPLICATION, WITHOUT LONG-TERM CURRENT USE OF INSULIN (HCC): Chronic | ICD-10-CM

## 2021-10-21 RX ORDER — PREGABALIN 50 MG/1
CAPSULE ORAL
Qty: 30 CAPSULE | Refills: 0 | Status: SHIPPED | OUTPATIENT
Start: 2021-10-21 | End: 2021-11-20

## 2021-11-15 ENCOUNTER — TELEPHONE (OUTPATIENT)
Dept: UROLOGY | Facility: CLINIC | Age: 71
End: 2021-11-15

## 2021-11-15 NOTE — TELEPHONE ENCOUNTER
“Please be informed that patient has passed. Patient has been marked  in the system. The date of death is: 10/23/21.    Caller: NICOLE Soler    Relationship: GIRLFRIEND    Best call back number: [phone]